# Patient Record
Sex: FEMALE | Race: OTHER | Employment: OTHER | URBAN - METROPOLITAN AREA
[De-identification: names, ages, dates, MRNs, and addresses within clinical notes are randomized per-mention and may not be internally consistent; named-entity substitution may affect disease eponyms.]

---

## 2019-09-18 ENCOUNTER — TELEPHONE (OUTPATIENT)
Dept: RHEUMATOLOGY | Facility: CLINIC | Age: 64
End: 2019-09-18

## 2019-09-18 NOTE — TELEPHONE ENCOUNTER
Attempted to call Keo Pyle with  [facility] to informed Dr. Elier Biswas has agreed to see patient Valerie Vicente  for consult. May call office to schedule appointment.

## 2019-10-02 ENCOUNTER — OFFICE VISIT (OUTPATIENT)
Dept: RHEUMATOLOGY | Facility: CLINIC | Age: 64
End: 2019-10-02
Payer: COMMERCIAL

## 2019-10-02 VITALS
WEIGHT: 167 LBS | DIASTOLIC BLOOD PRESSURE: 80 MMHG | HEIGHT: 63 IN | BODY MASS INDEX: 29.59 KG/M2 | HEART RATE: 90 BPM | SYSTOLIC BLOOD PRESSURE: 124 MMHG | RESPIRATION RATE: 16 BRPM

## 2019-10-02 DIAGNOSIS — G89.29 CHRONIC PAIN OF BOTH KNEES: ICD-10-CM

## 2019-10-02 DIAGNOSIS — Z51.81 THERAPEUTIC DRUG MONITORING: ICD-10-CM

## 2019-10-02 DIAGNOSIS — M25.562 CHRONIC PAIN OF BOTH KNEES: ICD-10-CM

## 2019-10-02 DIAGNOSIS — M05.9 SEROPOSITIVE RHEUMATOID ARTHRITIS (HCC): Primary | ICD-10-CM

## 2019-10-02 DIAGNOSIS — M25.561 CHRONIC PAIN OF BOTH KNEES: ICD-10-CM

## 2019-10-02 PROCEDURE — 99204 OFFICE O/P NEW MOD 45 MIN: CPT | Performed by: INTERNAL MEDICINE

## 2019-10-02 RX ORDER — ENALAPRIL MALEATE 20 MG/1
20 TABLET ORAL DAILY
Refills: 2 | COMMUNITY
Start: 2019-06-04 | End: 2020-05-08

## 2019-10-02 RX ORDER — MELOXICAM 15 MG/1
15 TABLET ORAL DAILY
Refills: 3 | COMMUNITY
Start: 2019-08-29 | End: 2020-02-07

## 2019-10-02 RX ORDER — PREDNISONE 10 MG/1
TABLET ORAL
Qty: 30 TABLET | Refills: 0 | Status: SHIPPED | OUTPATIENT
Start: 2019-10-02 | End: 2020-05-08

## 2019-10-02 NOTE — PATIENT INSTRUCTIONS
You were seen today for RA  Plan to start prednisone   Take 30 mg daily for 3 days then 20 mg daily for 3 days then stay on 10 mg daily until you see me again  Cont the methorexate 6 pills week  Stop the mobic  Will get blood work from previous doctors   F

## 2019-10-02 NOTE — PROGRESS NOTES
Shane Shanks is a 59year old female who presents for Patient presents with:  Consult  Arthritis: Bilateral knees  .    HPI:   CC: joint pain  Consult: referred by DORSI Murphy ([phone])  Rheumatologist Dr. Flor Rogel ([address] [place] [address], [phone]) HYSTERECTOMY     • TUBAL LIGATION        Family History   Problem Relation Age of Onset   • Diabetes Mother    • Hypertension Mother    • Diabetes Sister    • Diabetes Brother       Social History:  Social History    Tobacco Use      Smoking status: Never and abduction of R shoulder  Hip: normal log roll, no lateral hip pain, MAI test negative b/l  Knees: R knee warm, mild swelling, +TTP, limited ROM.     Ankles: R ankle mild swelling, +TTP  Feet: no pain with MTP squeeze, no toe swelling or pain or warmth Negative for crystals. She is found to have normal CMP and vitamin D. Hemoglobin was 9.6 and also found to have low TSH.     Dede Moore MD  10/2/2019  9:33 AM

## 2019-10-17 ENCOUNTER — TELEPHONE (OUTPATIENT)
Dept: RHEUMATOLOGY | Facility: CLINIC | Age: 64
End: 2019-10-17

## 2019-10-25 ENCOUNTER — OFFICE VISIT (OUTPATIENT)
Dept: RHEUMATOLOGY | Facility: CLINIC | Age: 64
End: 2019-10-25
Payer: COMMERCIAL

## 2019-10-25 ENCOUNTER — APPOINTMENT (OUTPATIENT)
Dept: LAB | Facility: HOSPITAL | Age: 64
End: 2019-10-25
Attending: INTERNAL MEDICINE
Payer: COMMERCIAL

## 2019-10-25 ENCOUNTER — HOSPITAL ENCOUNTER (OUTPATIENT)
Dept: GENERAL RADIOLOGY | Facility: HOSPITAL | Age: 64
Discharge: HOME | End: 2019-10-25
Attending: INTERNAL MEDICINE
Payer: COMMERCIAL

## 2019-10-25 VITALS
HEART RATE: 93 BPM | WEIGHT: 167 LBS | HEIGHT: 63 IN | RESPIRATION RATE: 16 BRPM | BODY MASS INDEX: 29.59 KG/M2 | SYSTOLIC BLOOD PRESSURE: 113 MMHG | DIASTOLIC BLOOD PRESSURE: 73 MMHG

## 2019-10-25 DIAGNOSIS — M05.9 SEROPOSITIVE RHEUMATOID ARTHRITIS (HCC): ICD-10-CM

## 2019-10-25 DIAGNOSIS — M25.562 CHRONIC PAIN OF BOTH KNEES: ICD-10-CM

## 2019-10-25 DIAGNOSIS — M25.561 CHRONIC PAIN OF BOTH KNEES: ICD-10-CM

## 2019-10-25 DIAGNOSIS — G89.29 CHRONIC PAIN OF BOTH KNEES: ICD-10-CM

## 2019-10-25 DIAGNOSIS — Z51.81 THERAPEUTIC DRUG MONITORING: ICD-10-CM

## 2019-10-25 DIAGNOSIS — M05.9 SEROPOSITIVE RHEUMATOID ARTHRITIS (HCC): Primary | ICD-10-CM

## 2019-10-25 PROCEDURE — 82955 ASSAY OF G6PD ENZYME: CPT | Performed by: INTERNAL MEDICINE

## 2019-10-25 PROCEDURE — 86704 HEP B CORE ANTIBODY TOTAL: CPT | Performed by: INTERNAL MEDICINE

## 2019-10-25 PROCEDURE — 85652 RBC SED RATE AUTOMATED: CPT | Performed by: INTERNAL MEDICINE

## 2019-10-25 PROCEDURE — 86706 HEP B SURFACE ANTIBODY: CPT | Performed by: INTERNAL MEDICINE

## 2019-10-25 PROCEDURE — 86480 TB TEST CELL IMMUN MEASURE: CPT

## 2019-10-25 PROCEDURE — 99214 OFFICE O/P EST MOD 30 MIN: CPT | Performed by: INTERNAL MEDICINE

## 2019-10-25 PROCEDURE — 84450 TRANSFERASE (AST) (SGOT): CPT

## 2019-10-25 PROCEDURE — 86140 C-REACTIVE PROTEIN: CPT | Performed by: INTERNAL MEDICINE

## 2019-10-25 PROCEDURE — 84460 ALANINE AMINO (ALT) (SGPT): CPT

## 2019-10-25 PROCEDURE — 20610 DRAIN/INJ JOINT/BURSA W/O US: CPT | Performed by: INTERNAL MEDICINE

## 2019-10-25 PROCEDURE — 82040 ASSAY OF SERUM ALBUMIN: CPT

## 2019-10-25 PROCEDURE — 86803 HEPATITIS C AB TEST: CPT | Performed by: INTERNAL MEDICINE

## 2019-10-25 PROCEDURE — 87340 HEPATITIS B SURFACE AG IA: CPT | Performed by: INTERNAL MEDICINE

## 2019-10-25 PROCEDURE — 82565 ASSAY OF CREATININE: CPT

## 2019-10-25 PROCEDURE — 73560 X-RAY EXAM OF KNEE 1 OR 2: CPT | Performed by: INTERNAL MEDICINE

## 2019-10-25 PROCEDURE — 36415 COLL VENOUS BLD VENIPUNCTURE: CPT | Performed by: INTERNAL MEDICINE

## 2019-10-25 PROCEDURE — 85025 COMPLETE CBC W/AUTO DIFF WBC: CPT | Performed by: INTERNAL MEDICINE

## 2019-10-25 RX ORDER — TRIAMCINOLONE ACETONIDE 40 MG/ML
40 INJECTION, SUSPENSION INTRA-ARTICULAR; INTRAMUSCULAR ONCE
Status: COMPLETED | OUTPATIENT
Start: 2019-10-25 | End: 2019-10-25

## 2019-10-25 RX ADMIN — TRIAMCINOLONE ACETONIDE 40 MG: 40 INJECTION, SUSPENSION INTRA-ARTICULAR; INTRAMUSCULAR at 16:25:00

## 2019-10-25 NOTE — PATIENT INSTRUCTIONS
You were seen today for RA  Now continue MTX 6 pills weekly and FA daily  Cont mobic 16 mg daily  Get blood work and XR knee today

## 2019-10-25 NOTE — PROGRESS NOTES
Simón Booker is a 59year old female. HPI:   Patient presents with: Follow - Up: Seropositive RA  Knee Pain: bilateral      I had the pleasure of seeing Simón Booker on 10/25/2019 for follow up Seropositve RA (+RF and CCP).      Current Medications: MG Oral Tab, Take 500 mg by mouth daily. , Disp: , Rfl: 1  Enalapril Maleate 20 MG Oral Tab, Take 20 mg by mouth daily. , Disp: , Rfl: 2  predniSONE 10 MG Oral Tab, Take 30 mg daily for 3 days then 20 mg daily for 3 days then stay on 10 mg daily until you se knee injected with 1 cc of lidocaine and 40 mg of Kenalog in sterile fashion. Patient tolerated procedure well. - Blood work ordered today.   If she continues to have inflammation may need to increase methotrexate or add another DMARD/biologic     R knee

## 2019-10-25 NOTE — PROCEDURES
With paitent's consent, I injected pt's R knee with 1ml lidocaine 1 % and 1 ml kenalog 40. It was done under sterile technique using iodine and alcohol swabs and ethyl chloride was used as an anaesthetic spray. Pt.  tolerated it well.

## 2019-10-30 ENCOUNTER — TELEPHONE (OUTPATIENT)
Dept: RHEUMATOLOGY | Facility: CLINIC | Age: 64
End: 2019-10-30

## 2019-10-30 DIAGNOSIS — R76.12 POSITIVE QUANTIFERON-TB GOLD TEST: Primary | ICD-10-CM

## 2019-10-30 NOTE — TELEPHONE ENCOUNTER
Contacted pt via  and informed of provider recommendations below, pt verbalized understanding. Contact information provided for [address] ([phone]). Pt will complete CXR this week.

## 2019-10-30 NOTE — TELEPHONE ENCOUNTER
[facility] lab called to advise of positive QFT results. Chest XR and ID consult with [facility]? Please advise.      Component      Latest Ref Rng & Units 10/25/2019   Quantiferon TB NIL      IU/mL 0.14   Quantiferon-TB1 Minus NIL      IU/mL 0.31   Quantiferon-TB2 Min

## 2019-10-31 ENCOUNTER — HOSPITAL ENCOUNTER (OUTPATIENT)
Dept: GENERAL RADIOLOGY | Facility: HOSPITAL | Age: 64
Discharge: HOME | End: 2019-10-31
Attending: INTERNAL MEDICINE
Payer: COMMERCIAL

## 2019-10-31 DIAGNOSIS — R76.12 POSITIVE QUANTIFERON-TB GOLD TEST: ICD-10-CM

## 2019-10-31 PROCEDURE — 71046 X-RAY EXAM CHEST 2 VIEWS: CPT | Performed by: INTERNAL MEDICINE

## 2019-11-01 ENCOUNTER — TELEPHONE (OUTPATIENT)
Dept: RHEUMATOLOGY | Facility: CLINIC | Age: 64
End: 2019-11-01

## 2019-11-01 NOTE — TELEPHONE ENCOUNTER
Pt contacted and advised of results with son translating. Son advised pt will need referral from [facility]. Spoke with Rosina Castro at [facility]; he advised to send CXR report, labs, and LOV note to them. They will work on the referral to ID.  Information

## 2019-11-01 NOTE — TELEPHONE ENCOUNTER
If you can let her know that her CXR showed some scarring at the bases but otherwise normal, no cavitations which is normally seen in TB.  Hopefully she made an appt with ID

## 2019-11-07 RX ORDER — METHYLPREDNISOLONE 4 MG/1
TABLET ORAL
Qty: 1 PACKAGE | Refills: 0 | Status: SHIPPED | OUTPATIENT
Start: 2019-11-07 | End: 2020-05-08

## 2019-12-02 ENCOUNTER — TELEPHONE (OUTPATIENT)
Dept: RHEUMATOLOGY | Facility: CLINIC | Age: 64
End: 2019-12-02

## 2019-12-02 NOTE — TELEPHONE ENCOUNTER
Reviewed records that were faxed over.   She had MRI of the R shoulder done showing evidence of distal supraspinatus tendon chronic tear, biceps tenosynovitis, GH joint effusions, AC joint arthropathy, focal edema/contusion along the attachments of the rota

## 2019-12-02 NOTE — TELEPHONE ENCOUNTER
Reviewed blood work that was faxed over. She was found to have a + ABIMBOLA of 1: 1280.   RF was 10, CRP 26 mg/L, ESR 52,

## 2020-01-10 ENCOUNTER — TELEPHONE (OUTPATIENT)
Dept: RHEUMATOLOGY | Facility: CLINIC | Age: 65
End: 2020-01-10

## 2020-01-10 NOTE — TELEPHONE ENCOUNTER
Per patient she would like to come and see Dr Yeison Schultz due to she's in pain to her both knees, and would like to come and see doctor in [facility]. v Patient can only speak Japanese.

## 2020-01-10 NOTE — TELEPHONE ENCOUNTER
LMTCB [id number]. Appt available on Monday in [facility] - 11:40am slot placed on hold for pt.

## 2020-01-13 NOTE — TELEPHONE ENCOUNTER
Per family member, pt would like to keep appt as scheduled for re-evaluation. Sooner appts in [facility] declined.      Future Appointments   Date Time Provider Yamileth Hoang   2/7/2020  4:20 PM Herbert Jean Baptiste MD [address] [facility]

## 2020-02-07 ENCOUNTER — HOSPITAL ENCOUNTER (OUTPATIENT)
Dept: GENERAL RADIOLOGY | Facility: HOSPITAL | Age: 65
Discharge: HOME | End: 2020-02-07
Attending: INTERNAL MEDICINE
Payer: COMMERCIAL

## 2020-02-07 ENCOUNTER — OFFICE VISIT (OUTPATIENT)
Dept: RHEUMATOLOGY | Facility: CLINIC | Age: 65
End: 2020-02-07
Payer: COMMERCIAL

## 2020-02-07 ENCOUNTER — LAB ENCOUNTER (OUTPATIENT)
Dept: LAB | Facility: HOSPITAL | Age: 65
End: 2020-02-07
Attending: INTERNAL MEDICINE
Payer: COMMERCIAL

## 2020-02-07 VITALS — SYSTOLIC BLOOD PRESSURE: 130 MMHG | DIASTOLIC BLOOD PRESSURE: 81 MMHG | HEART RATE: 88 BPM

## 2020-02-07 DIAGNOSIS — M25.562 CHRONIC PAIN OF BOTH KNEES: ICD-10-CM

## 2020-02-07 DIAGNOSIS — Z51.81 THERAPEUTIC DRUG MONITORING: ICD-10-CM

## 2020-02-07 DIAGNOSIS — M25.561 CHRONIC PAIN OF BOTH KNEES: ICD-10-CM

## 2020-02-07 DIAGNOSIS — G89.29 CHRONIC PAIN OF BOTH KNEES: ICD-10-CM

## 2020-02-07 DIAGNOSIS — M05.9 SEROPOSITIVE RHEUMATOID ARTHRITIS (HCC): ICD-10-CM

## 2020-02-07 DIAGNOSIS — M05.9 SEROPOSITIVE RHEUMATOID ARTHRITIS (HCC): Primary | ICD-10-CM

## 2020-02-07 DIAGNOSIS — R76.12 POSITIVE QUANTIFERON-TB GOLD TEST: ICD-10-CM

## 2020-02-07 LAB
ALBUMIN SERPL-MCNC: 3.5 G/DL (ref 3.4–5)
ALT SERPL-CCNC: 19 U/L (ref 13–56)
AST SERPL-CCNC: 14 U/L (ref 15–37)
BASOPHILS # BLD AUTO: 0 X10(3) UL (ref 0–0.2)
BASOPHILS NFR BLD AUTO: 0 %
CREAT BLD-MCNC: 1.09 MG/DL (ref 0.55–1.02)
CRP SERPL-MCNC: 0.72 MG/DL (ref ?–0.3)
DEPRECATED RDW RBC AUTO: 58.8 FL (ref 35.1–46.3)
EOSINOPHIL # BLD AUTO: 0.03 X10(3) UL (ref 0–0.7)
EOSINOPHIL NFR BLD AUTO: 0.5 %
ERYTHROCYTE [DISTWIDTH] IN BLOOD BY AUTOMATED COUNT: 17.2 % (ref 11–15)
ERYTHROCYTE [SEDIMENTATION RATE] IN BLOOD: 25 MM/HR (ref 0–30)
HCT VFR BLD AUTO: 32.3 % (ref 35–48)
HGB BLD-MCNC: 9.9 G/DL (ref 12–16)
IMM GRANULOCYTES # BLD AUTO: 0.02 X10(3) UL (ref 0–1)
IMM GRANULOCYTES NFR BLD: 0.3 %
LYMPHOCYTES # BLD AUTO: 0.6 X10(3) UL (ref 1–4)
LYMPHOCYTES NFR BLD AUTO: 10.2 %
MCH RBC QN AUTO: 28.5 PG (ref 26–34)
MCHC RBC AUTO-ENTMCNC: 30.7 G/DL (ref 31–37)
MCV RBC AUTO: 93.1 FL (ref 80–100)
MONOCYTES # BLD AUTO: 0.25 X10(3) UL (ref 0.1–1)
MONOCYTES NFR BLD AUTO: 4.3 %
NEUTROPHILS # BLD AUTO: 4.96 X10 (3) UL (ref 1.5–7.7)
NEUTROPHILS # BLD AUTO: 4.96 X10(3) UL (ref 1.5–7.7)
NEUTROPHILS NFR BLD AUTO: 84.7 %
PLATELET # BLD AUTO: 229 10(3)UL (ref 150–450)
RBC # BLD AUTO: 3.47 X10(6)UL (ref 3.8–5.3)
WBC # BLD AUTO: 5.9 X10(3) UL (ref 4–11)

## 2020-02-07 PROCEDURE — 73560 X-RAY EXAM OF KNEE 1 OR 2: CPT | Performed by: INTERNAL MEDICINE

## 2020-02-07 PROCEDURE — 84450 TRANSFERASE (AST) (SGOT): CPT

## 2020-02-07 PROCEDURE — 99214 OFFICE O/P EST MOD 30 MIN: CPT | Performed by: INTERNAL MEDICINE

## 2020-02-07 PROCEDURE — 86140 C-REACTIVE PROTEIN: CPT | Performed by: INTERNAL MEDICINE

## 2020-02-07 PROCEDURE — 84460 ALANINE AMINO (ALT) (SGPT): CPT

## 2020-02-07 PROCEDURE — 82565 ASSAY OF CREATININE: CPT

## 2020-02-07 PROCEDURE — 36415 COLL VENOUS BLD VENIPUNCTURE: CPT | Performed by: INTERNAL MEDICINE

## 2020-02-07 PROCEDURE — 82040 ASSAY OF SERUM ALBUMIN: CPT

## 2020-02-07 PROCEDURE — 85025 COMPLETE CBC W/AUTO DIFF WBC: CPT | Performed by: INTERNAL MEDICINE

## 2020-02-07 PROCEDURE — 20610 DRAIN/INJ JOINT/BURSA W/O US: CPT | Performed by: INTERNAL MEDICINE

## 2020-02-07 PROCEDURE — 85652 RBC SED RATE AUTOMATED: CPT | Performed by: INTERNAL MEDICINE

## 2020-02-07 RX ORDER — HYDROCHLOROTHIAZIDE 12.5 MG/1
CAPSULE, GELATIN COATED ORAL
COMMUNITY
Start: 2020-01-03 | End: 2021-03-24

## 2020-02-07 RX ORDER — FOLIC ACID 1 MG/1
1 TABLET ORAL
COMMUNITY
End: 2020-02-07

## 2020-02-07 RX ORDER — FOLIC ACID 1 MG/1
1 TABLET ORAL DAILY
Qty: 90 TABLET | Refills: 0 | Status: SHIPPED | OUTPATIENT
Start: 2020-02-07 | End: 2020-05-08

## 2020-02-07 RX ORDER — TRIAMCINOLONE ACETONIDE 40 MG/ML
40 INJECTION, SUSPENSION INTRA-ARTICULAR; INTRAMUSCULAR
Status: COMPLETED | OUTPATIENT
Start: 2020-02-07 | End: 2020-02-07

## 2020-02-07 RX ORDER — MELOXICAM 15 MG/1
15 TABLET ORAL DAILY
Qty: 90 TABLET | Refills: 0 | Status: SHIPPED | OUTPATIENT
Start: 2020-02-07 | End: 2020-05-08

## 2020-02-07 RX ORDER — LIDOCAINE HYDROCHLORIDE 20 MG/ML
SOLUTION ORAL; TOPICAL
COMMUNITY
Start: 2020-01-09

## 2020-02-07 RX ADMIN — TRIAMCINOLONE ACETONIDE 40 MG: 40 INJECTION, SUSPENSION INTRA-ARTICULAR; INTRAMUSCULAR at 17:16:00

## 2020-02-07 RX ADMIN — TRIAMCINOLONE ACETONIDE 40 MG: 40 INJECTION, SUSPENSION INTRA-ARTICULAR; INTRAMUSCULAR at 17:15:00

## 2020-02-07 NOTE — PATIENT INSTRUCTIONS
You were seen today for RA  Cont methotrexate 6 pills weekly  Folic acid daily  Cont meloxicam 15 mg daily  Plan to taper prednisone, take 5 mg daily until its done   Injected your knees today  Will get a hold of the ID specialist you saw

## 2020-02-07 NOTE — PROGRESS NOTES
Fifi Argueta is a 62year old female. HPI:   Patient presents with:  Knee Pain: Difficult to stand and walk      I had the pleasure of seeing Fifi Argueta on 2/7/2020 for follow up Seropositve RA (+RF and CCP).      Current Medications:  MTX 6 pills we PO QAM     • methotrexate 2.5 MG Oral Tab Take 2.5 mg by mouth once a week. TK 6 TAB WEEKLY ALL ON SAME DAY  4   • Meloxicam 15 MG Oral Tab Take 15 mg by mouth daily. 3   • metFORMIN HCl 500 MG Oral Tab Take 500 mg by mouth daily.   1   • predniSONE 10 MG and CCP)  - most of her pain is in her bll knees and ankles  - Continue methotrexate 6 pills weekly and folic acid daily  - Cont mobic 15 mg daily  - start tapering prednisone, take 5 mg daily for the next 2 weeks then stop  - Blood work today    +Quant TB

## 2020-02-10 ENCOUNTER — TELEPHONE (OUTPATIENT)
Dept: INTERNAL MEDICINE CLINIC | Facility: CLINIC | Age: 65
End: 2020-02-10

## 2020-02-10 NOTE — TELEPHONE ENCOUNTER
• methotrexate 2.5 MG Oral Tab Take 6 tablets (15 mg total) by mouth once a week. TK 6 TAB WEEKLY ALL ON SAME DAY 72 tablet 0     To submit PA for Kat Leong:    1) Go to key. [employer] and click \"Enter a Key\"  2) Enter the patient's last name, d

## 2020-02-10 NOTE — TELEPHONE ENCOUNTER
PA started on Cover My Meds: Please advise the dispensing pharmacy to [address] [place] [address] at [phone] for assistance. Pharmacy contacted and aware.

## 2020-02-20 NOTE — PROGRESS NOTES
Attempted to contact pt via [employer] ID #[id number]- no answer, left message to contact office. Ext [phone] today. No response letter mailed to pt with results and provider comments.

## 2020-03-19 ENCOUNTER — TELEPHONE (OUTPATIENT)
Dept: RHEUMATOLOGY | Facility: CLINIC | Age: 65
End: 2020-03-19

## 2020-03-19 NOTE — TELEPHONE ENCOUNTER
Patient was found to have a positive TB. She was seen by infectious disease at [facility] and they stated that she was treated via the [address]. Fax received and she was treated with rifampin back in January 2018.   She completed

## 2020-05-08 ENCOUNTER — TELEPHONE (OUTPATIENT)
Dept: RHEUMATOLOGY | Facility: CLINIC | Age: 65
End: 2020-05-08

## 2020-05-08 ENCOUNTER — OFFICE VISIT (OUTPATIENT)
Dept: RHEUMATOLOGY | Facility: CLINIC | Age: 65
End: 2020-05-08
Payer: COMMERCIAL

## 2020-05-08 ENCOUNTER — APPOINTMENT (OUTPATIENT)
Dept: LAB | Facility: HOSPITAL | Age: 65
End: 2020-05-08
Attending: INTERNAL MEDICINE
Payer: COMMERCIAL

## 2020-05-08 VITALS
RESPIRATION RATE: 16 BRPM | SYSTOLIC BLOOD PRESSURE: 102 MMHG | DIASTOLIC BLOOD PRESSURE: 68 MMHG | BODY MASS INDEX: 30 KG/M2 | HEIGHT: 63 IN | HEART RATE: 96 BPM

## 2020-05-08 DIAGNOSIS — M25.561 CHRONIC PAIN OF BOTH KNEES: ICD-10-CM

## 2020-05-08 DIAGNOSIS — M05.9 SEROPOSITIVE RHEUMATOID ARTHRITIS (HCC): ICD-10-CM

## 2020-05-08 DIAGNOSIS — M25.562 CHRONIC PAIN OF BOTH KNEES: ICD-10-CM

## 2020-05-08 DIAGNOSIS — R76.12 POSITIVE QUANTIFERON-TB GOLD TEST: ICD-10-CM

## 2020-05-08 DIAGNOSIS — G89.29 CHRONIC PAIN OF BOTH KNEES: ICD-10-CM

## 2020-05-08 DIAGNOSIS — Z51.81 THERAPEUTIC DRUG MONITORING: ICD-10-CM

## 2020-05-08 DIAGNOSIS — M05.9 SEROPOSITIVE RHEUMATOID ARTHRITIS (HCC): Primary | ICD-10-CM

## 2020-05-08 PROCEDURE — 86140 C-REACTIVE PROTEIN: CPT | Performed by: INTERNAL MEDICINE

## 2020-05-08 PROCEDURE — 82040 ASSAY OF SERUM ALBUMIN: CPT

## 2020-05-08 PROCEDURE — 85025 COMPLETE CBC W/AUTO DIFF WBC: CPT | Performed by: INTERNAL MEDICINE

## 2020-05-08 PROCEDURE — 20610 DRAIN/INJ JOINT/BURSA W/O US: CPT | Performed by: INTERNAL MEDICINE

## 2020-05-08 PROCEDURE — 84450 TRANSFERASE (AST) (SGOT): CPT

## 2020-05-08 PROCEDURE — 36415 COLL VENOUS BLD VENIPUNCTURE: CPT | Performed by: INTERNAL MEDICINE

## 2020-05-08 PROCEDURE — 99214 OFFICE O/P EST MOD 30 MIN: CPT | Performed by: INTERNAL MEDICINE

## 2020-05-08 PROCEDURE — 85652 RBC SED RATE AUTOMATED: CPT | Performed by: INTERNAL MEDICINE

## 2020-05-08 PROCEDURE — 82565 ASSAY OF CREATININE: CPT

## 2020-05-08 PROCEDURE — 84460 ALANINE AMINO (ALT) (SGPT): CPT

## 2020-05-08 RX ORDER — FOLIC ACID 1 MG/1
1 TABLET ORAL DAILY
Qty: 90 TABLET | Refills: 0 | Status: SHIPPED | OUTPATIENT
Start: 2020-05-08 | End: 2020-10-05

## 2020-05-08 RX ORDER — TRIAMCINOLONE ACETONIDE 40 MG/ML
40 INJECTION, SUSPENSION INTRA-ARTICULAR; INTRAMUSCULAR
Status: COMPLETED | OUTPATIENT
Start: 2020-05-08 | End: 2020-05-08

## 2020-05-08 RX ORDER — MELOXICAM 15 MG/1
15 TABLET ORAL DAILY
Qty: 90 TABLET | Refills: 0 | Status: SHIPPED | OUTPATIENT
Start: 2020-05-08

## 2020-05-08 RX ADMIN — TRIAMCINOLONE ACETONIDE 40 MG: 40 INJECTION, SUSPENSION INTRA-ARTICULAR; INTRAMUSCULAR at 10:40:00

## 2020-05-08 RX ADMIN — TRIAMCINOLONE ACETONIDE 40 MG: 40 INJECTION, SUSPENSION INTRA-ARTICULAR; INTRAMUSCULAR at 10:41:00

## 2020-05-08 NOTE — PATIENT INSTRUCTIONS
You were seen today for rheumatoid arthritis  You continue to have bilateral knee pain despite being on methotrexate  Plan on starting Humira, this will need a prior authorization  Please get blood work today  We injected both knees with cortisone today, h

## 2020-05-08 NOTE — PROGRESS NOTES
Butch Laughlin is a 62year old female. HPI:   Patient presents with:   Follow - Up: Seropositive RA  Knee Pain: Bilateral, onset x1 month, Injection      I had the pleasure of seeing Butch Laughlin on 5/8/2020 for follow up Seropositve RA (+RF hydrochlorothiazide 12.5 MG Oral Cap TK 1 C PO QAM     • methotrexate 2.5 MG Oral Tab Take 6 tablets (15 mg total) by mouth once a week. TK 6 TAB WEEKLY ALL ON SAME DAY 72 tablet 0   • Meloxicam 15 MG Oral Tab Take 1 tablet (15 mg total) by mouth daily.  80 mood       LABS:     May 2019:  Vitamin D 48, Hgb 9.2  RF 17.9, ESR 26, CRP 6.9 mg/L    Previous rheumatologist  Synovial fluid removed showing cell count of 38,405, nucleated cells 83%.  Negative for crystals     Imaging:     None    ASSESSMENT/PLAN:     S

## 2020-05-08 NOTE — TELEPHONE ENCOUNTER
Pt was provided with [facility] pt assistance application - she will complete and bring it back to office.

## 2020-05-11 ENCOUNTER — TELEPHONE (OUTPATIENT)
Dept: RHEUMATOLOGY | Facility: CLINIC | Age: 65
End: 2020-05-11

## 2020-05-11 RX ORDER — PREDNISONE 10 MG/1
TABLET ORAL
Qty: 30 TABLET | Refills: 0 | Status: SHIPPED | OUTPATIENT
Start: 2020-05-11 | End: 2020-06-08

## 2020-06-08 RX ORDER — PREDNISONE 10 MG/1
TABLET ORAL
Qty: 30 TABLET | Refills: 0 | Status: SHIPPED | OUTPATIENT
Start: 2020-06-08 | End: 2020-08-05

## 2020-06-08 NOTE — TELEPHONE ENCOUNTER
Requested Prescriptions     Pending Prescriptions Disp Refills   • predniSONE 10 MG Oral Tab 30 tablet 0     Si mg daily x3 days then 30 mg daily x3 days then 20 mg daily x3 days then 10 mg daily x3 days then stop     LF: 20 #30 TAB W/ 0 RF   LOV risks/benefits d/w patient  - she had a +quant TB in 2017 but treated with rifampin for 4 mos in Jan 2018, will need annual CXR to monitor  - Blood work today     +Quant TB  - treated in Jan 2018     B/L knee pain secondary to OA and RA  - synovial fluid a

## 2020-06-08 NOTE — TELEPHONE ENCOUNTER
Per [employer], they are missing pt's proof of income to determine eligibility. Someone from the program will contact pt again to obtain necessary information.

## 2020-06-08 NOTE — TELEPHONE ENCOUNTER
Spoke with pt - she reports prednisone burst helped significantly. Her pain has decreased and she is able to walk short distances with the assistance of her walker. Pt is requesting a refill of prednisone.      INDRA Moore ([facility] pt assistnace program) is

## 2020-06-18 NOTE — TELEPHONE ENCOUNTER
Contacted pt to follow up. Advised pt to call [employer] pt assistance [facility] and provide missing information to determine eligibility.

## 2020-06-27 ENCOUNTER — HOSPITAL ENCOUNTER (OUTPATIENT)
Dept: CT IMAGING | Facility: HOSPITAL | Age: 65
Discharge: HOME | End: 2020-06-27
Attending: NURSE PRACTITIONER
Payer: COMMERCIAL

## 2020-06-27 DIAGNOSIS — K59.00 CONSTIPATED: ICD-10-CM

## 2020-06-27 LAB — CREAT BLD-MCNC: 1 MG/DL (ref 0.55–1.02)

## 2020-06-27 PROCEDURE — 82565 ASSAY OF CREATININE: CPT

## 2020-06-27 PROCEDURE — 74177 CT ABD & PELVIS W/CONTRAST: CPT | Performed by: NURSE PRACTITIONER

## 2020-07-08 ENCOUNTER — OFFICE VISIT (OUTPATIENT)
Dept: RHEUMATOLOGY | Facility: CLINIC | Age: 65
End: 2020-07-08
Payer: COMMERCIAL

## 2020-07-08 ENCOUNTER — APPOINTMENT (OUTPATIENT)
Dept: LAB | Facility: HOSPITAL | Age: 65
End: 2020-07-08
Attending: INTERNAL MEDICINE
Payer: COMMERCIAL

## 2020-07-08 VITALS
BODY MASS INDEX: 28.88 KG/M2 | DIASTOLIC BLOOD PRESSURE: 79 MMHG | SYSTOLIC BLOOD PRESSURE: 153 MMHG | HEIGHT: 63 IN | HEART RATE: 87 BPM | WEIGHT: 163 LBS

## 2020-07-08 DIAGNOSIS — M25.561 CHRONIC PAIN OF BOTH KNEES: ICD-10-CM

## 2020-07-08 DIAGNOSIS — G89.29 CHRONIC PAIN OF BOTH KNEES: ICD-10-CM

## 2020-07-08 DIAGNOSIS — M25.562 CHRONIC PAIN OF BOTH KNEES: ICD-10-CM

## 2020-07-08 DIAGNOSIS — R76.12 POSITIVE QUANTIFERON-TB GOLD TEST: ICD-10-CM

## 2020-07-08 DIAGNOSIS — M05.9 SEROPOSITIVE RHEUMATOID ARTHRITIS (HCC): Primary | ICD-10-CM

## 2020-07-08 DIAGNOSIS — Z51.81 THERAPEUTIC DRUG MONITORING: ICD-10-CM

## 2020-07-08 DIAGNOSIS — M05.9 SEROPOSITIVE RHEUMATOID ARTHRITIS (HCC): ICD-10-CM

## 2020-07-08 LAB
ALBUMIN SERPL-MCNC: 3.6 G/DL (ref 3.4–5)
ALT SERPL-CCNC: 22 U/L (ref 13–56)
AST SERPL-CCNC: 18 U/L (ref 15–37)
BASOPHILS # BLD AUTO: 0.01 X10(3) UL (ref 0–0.2)
BASOPHILS NFR BLD AUTO: 0.1 %
CREAT BLD-MCNC: 1.13 MG/DL (ref 0.55–1.02)
CRP SERPL-MCNC: 0.46 MG/DL (ref ?–0.3)
DEPRECATED RDW RBC AUTO: 63.5 FL (ref 35.1–46.3)
EOSINOPHIL # BLD AUTO: 0.16 X10(3) UL (ref 0–0.7)
EOSINOPHIL NFR BLD AUTO: 2.2 %
ERYTHROCYTE [DISTWIDTH] IN BLOOD BY AUTOMATED COUNT: 17.2 % (ref 11–15)
ERYTHROCYTE [SEDIMENTATION RATE] IN BLOOD: 21 MM/HR (ref 0–30)
HCT VFR BLD AUTO: 33.1 % (ref 35–48)
HGB BLD-MCNC: 10.3 G/DL (ref 12–16)
IMM GRANULOCYTES # BLD AUTO: 0.05 X10(3) UL (ref 0–1)
IMM GRANULOCYTES NFR BLD: 0.7 %
LYMPHOCYTES # BLD AUTO: 1.82 X10(3) UL (ref 1–4)
LYMPHOCYTES NFR BLD AUTO: 25.3 %
MCH RBC QN AUTO: 31.1 PG (ref 26–34)
MCHC RBC AUTO-ENTMCNC: 31.1 G/DL (ref 31–37)
MCV RBC AUTO: 100 FL (ref 80–100)
MONOCYTES # BLD AUTO: 0.62 X10(3) UL (ref 0.1–1)
MONOCYTES NFR BLD AUTO: 8.6 %
NEUTROPHILS # BLD AUTO: 4.53 X10 (3) UL (ref 1.5–7.7)
NEUTROPHILS # BLD AUTO: 4.53 X10(3) UL (ref 1.5–7.7)
NEUTROPHILS NFR BLD AUTO: 63.1 %
PLATELET # BLD AUTO: 214 10(3)UL (ref 150–450)
RBC # BLD AUTO: 3.31 X10(6)UL (ref 3.8–5.3)
WBC # BLD AUTO: 7.2 X10(3) UL (ref 4–11)

## 2020-07-08 PROCEDURE — 85652 RBC SED RATE AUTOMATED: CPT | Performed by: INTERNAL MEDICINE

## 2020-07-08 PROCEDURE — 86140 C-REACTIVE PROTEIN: CPT | Performed by: INTERNAL MEDICINE

## 2020-07-08 PROCEDURE — 84460 ALANINE AMINO (ALT) (SGPT): CPT

## 2020-07-08 PROCEDURE — 84450 TRANSFERASE (AST) (SGOT): CPT

## 2020-07-08 PROCEDURE — 82565 ASSAY OF CREATININE: CPT

## 2020-07-08 PROCEDURE — 99214 OFFICE O/P EST MOD 30 MIN: CPT | Performed by: INTERNAL MEDICINE

## 2020-07-08 PROCEDURE — 82040 ASSAY OF SERUM ALBUMIN: CPT

## 2020-07-08 PROCEDURE — 36415 COLL VENOUS BLD VENIPUNCTURE: CPT | Performed by: INTERNAL MEDICINE

## 2020-07-08 PROCEDURE — 85025 COMPLETE CBC W/AUTO DIFF WBC: CPT | Performed by: INTERNAL MEDICINE

## 2020-07-08 RX ORDER — PREDNISONE 1 MG/1
5 TABLET ORAL DAILY
Qty: 30 TABLET | Refills: 0 | Status: SHIPPED | OUTPATIENT
Start: 2020-07-08 | End: 2020-08-07

## 2020-07-08 NOTE — TELEPHONE ENCOUNTER
Per [facility] pt assistance program, pt has not provided proof of income. Pt was instructed to call [employer] at [phone] during today's appt to provide necessary information.

## 2020-07-08 NOTE — PATIENT INSTRUCTIONS
You were seen today for RA  Now cont MTX 6 pills weekly  Also continue folic acid daily  Will prescribe prednisone 5 mg daily  Blood work today  Need to get you approved for [employer]

## 2020-07-08 NOTE — PROGRESS NOTES
Batool Herrera is a 62year old female. HPI:   Patient presents with: Follow - Up  Knee Pain      I had the pleasure of seeing Batool Herrera on 7/8/2020 for follow up Seropositve RA (+RF and CCP).      Current Medications: visit):  Current Outpatient Medications   Medication Sig Dispense Refill   • predniSONE 10 MG Oral Tab 40 mg daily x3 days then 30 mg daily x3 days then 20 mg daily x3 days then 10 mg daily x3 days then stop 30 tablet 0   • methotrexate 2.5 MG Oral Tab Brianne Lindo rheumatologist  Synovial fluid removed showing cell count of 38,405, nucleated cells 83%.  Negative for crystals     Imaging:     None    ASSESSMENT/PLAN:     Seropositive RA (+RF and CCP)  - most of her pain is in her bll knees and ankles  - Continue metho

## 2020-07-09 ENCOUNTER — TELEPHONE (OUTPATIENT)
Dept: RHEUMATOLOGY | Facility: CLINIC | Age: 65
End: 2020-07-09

## 2020-07-09 NOTE — TELEPHONE ENCOUNTER
Marisa Harrison from [employer] returned call and confirmed she received VM. She will authorize MTX #72 tablets at local [facility].

## 2020-07-09 NOTE — PROGRESS NOTES
Contacted pt via  Caryn Ricci ID [id number] and informed of results. Pt verbalized understanding with no additional questions.

## 2020-07-13 ENCOUNTER — TELEPHONE (OUTPATIENT)
Dept: INTERNAL MEDICINE CLINIC | Facility: CLINIC | Age: 65
End: 2020-07-13

## 2020-07-13 NOTE — TELEPHONE ENCOUNTER
Patient dropped off form for financial assistance for Humira. She also dropped off copies of Tax return of 2019 and other documents. Patient asked if nurse can look over application to make sure it was filled out correctly. Please advise.

## 2020-07-15 ENCOUNTER — TELEPHONE (OUTPATIENT)
Dept: INTERNAL MEDICINE CLINIC | Facility: CLINIC | Age: 65
End: 2020-07-15

## 2020-07-15 NOTE — TELEPHONE ENCOUNTER
[employer] representative [facility] notified of updated paperwork faxed in today. Additional forms will be reviewed and pt will be contacted by [facility] for follow up in [facility].      Pt updated and instructed to contact [facility] at [phone] an

## 2020-07-15 NOTE — TELEPHONE ENCOUNTER
Patient dropped off paperwork (see encounter 7/13), patient states she has a phone number for [employer] but she claims she can't get thru to anyone. Patient would like to know if there is another number she can dial. Please advise.

## 2020-07-15 NOTE — TELEPHONE ENCOUNTER
Application faxed to [facility] patient assistance foundation. Confirmation received.      Contacted pt's daughter Elvis Primes to update; she is aware pt will need to contact [facility] assist to follow up on status of application and any additional requirements

## 2020-07-17 NOTE — TELEPHONE ENCOUNTER
Pt has been approved for assistance until 7/16/2021. Does patient need at teaching on office with the nurse?

## 2020-07-21 NOTE — TELEPHONE ENCOUNTER
Left detailed message via  Summer Jrodan #[id number]. Can patient come to the [address] [place] [address] office [facility] the [facility] nurse this Tuesday, Wednesday, or Thursday?? Okay to schedule if patient is agreeable when she calls back.  Pt was approved

## 2020-07-23 ENCOUNTER — TELEPHONE (OUTPATIENT)
Dept: RHEUMATOLOGY | Facility: CLINIC | Age: 65
End: 2020-07-23

## 2020-07-23 NOTE — TELEPHONE ENCOUNTER
Addy Velasquez called and left same message in Danish. If patient is agreeable please schedule [facility] nurse visit [facility]. She will need to contact [employer] at [phone] to schedule delivery of medication.     Elvis Cardona   Medical Assistant      Tel

## 2020-07-23 NOTE — TELEPHONE ENCOUNTER
Called and left a VM in Colombian, inform patient to call and schedule and [facility] nurse visit [facility] with the [facility] nurse for  Tuesday, Wednesday, or Thursday?? Okay to schedule if patient is agreeable when she calls back.  Pt was approved for patient

## 2020-07-23 NOTE — TELEPHONE ENCOUNTER
Patient states that she is returning the nurses call. No  Message noted. Pt is asking for return call to be in Estonian.

## 2020-07-31 ENCOUNTER — TELEPHONE (OUTPATIENT)
Dept: RHEUMATOLOGY | Facility: CLINIC | Age: 65
End: 2020-07-31

## 2020-07-31 NOTE — TELEPHONE ENCOUNTER
Patient called in (with ) stating that she has the Humira medication from [place]. She would like to schedule the teaching appointment with the nurses. Please advise.

## 2020-08-03 ENCOUNTER — TELEPHONE (OUTPATIENT)
Dept: RHEUMATOLOGY | Facility: CLINIC | Age: 65
End: 2020-08-03

## 2020-08-03 NOTE — TELEPHONE ENCOUNTER
Patient is asking for a called back she trying to see not understanding the message with the  Basil Estrada on the phone and also she trying to see what she coming for is it the shot or training       Please advise   [phone]

## 2020-08-03 NOTE — TELEPHONE ENCOUNTER
Puerto Rican Speaking - Patient is returning phone call and is requesting call back at # [phone].

## 2020-08-03 NOTE — TELEPHONE ENCOUNTER
Callled and left a VM to patient in Citizen of [place] informed to return call and to help schedule the Nurse teaching for Humira for today in afternoon or Friday. Please help when she calls back.

## 2020-08-04 NOTE — TELEPHONE ENCOUNTER
Via Colombian, , Rony Bruce #[id number], left message to call office back. Please schedule Humira teaching with [facility] nurse for this Friday or next week at [facility] when patient calls back, patient does not need to bring medication to office.  Office has a sampl

## 2020-08-05 RX ORDER — PREDNISONE 10 MG/1
TABLET ORAL
Qty: 21 TABLET | Refills: 0 | Status: SHIPPED | OUTPATIENT
Start: 2020-08-05 | End: 2020-09-17

## 2020-08-05 NOTE — TELEPHONE ENCOUNTER
Requested Prescriptions     Pending Prescriptions Disp Refills   • predniSONE 10 MG Oral Tab 30 tablet 0     Si mg daily x3 days then 30 mg daily x3 days then 20 mg daily x3 days then 10 mg daily x3 days then stop       LF: 20 #30 TAB W/ 0 RF  LOV only supposed to be on a tapering dose. Advised to take prednisone 10 mg daily for the next week and then go down to 5 mg daily. - Currently trying to get her approved for Humira, insurance is needing information from patient.   She will provide that and

## 2020-08-05 NOTE — TELEPHONE ENCOUNTER
Can you call her and tell her I changed the script a little bit  - so it's over 6 days - does she want to come in earlier if she is flaring?

## 2020-08-05 NOTE — TELEPHONE ENCOUNTER
Patient called in (with ) and was given message below. She is scheduled for 8/7 at 10am for the teaching.

## 2020-08-07 ENCOUNTER — NURSE ONLY (OUTPATIENT)
Dept: RHEUMATOLOGY | Facility: CLINIC | Age: 65
End: 2020-08-07

## 2020-08-07 DIAGNOSIS — M05.9 SEROPOSITIVE RHEUMATOID ARTHRITIS (HCC): ICD-10-CM

## 2020-08-07 DIAGNOSIS — Z51.81 ENCOUNTER FOR THERAPEUTIC DRUG MONITORING: ICD-10-CM

## 2020-08-07 DIAGNOSIS — M25.562 CHRONIC PAIN OF BOTH KNEES: ICD-10-CM

## 2020-08-07 DIAGNOSIS — R76.12 POSITIVE QUANTIFERON-TB GOLD TEST: ICD-10-CM

## 2020-08-07 DIAGNOSIS — Z51.81 THERAPEUTIC DRUG MONITORING: ICD-10-CM

## 2020-08-07 DIAGNOSIS — M25.561 CHRONIC PAIN OF BOTH KNEES: ICD-10-CM

## 2020-08-07 DIAGNOSIS — G89.29 CHRONIC PAIN OF BOTH KNEES: ICD-10-CM

## 2020-08-07 DIAGNOSIS — M05.79 SEROPOSITIVE RHEUMATOID ARTHRITIS OF MULTIPLE SITES (HCC): Primary | ICD-10-CM

## 2020-08-07 PROCEDURE — 99211 OFF/OP EST MAY X REQ PHY/QHP: CPT | Performed by: INTERNAL MEDICINE

## 2020-08-07 RX ORDER — PREDNISONE 1 MG/1
5 TABLET ORAL DAILY
Qty: 90 TABLET | Refills: 0 | Status: SHIPPED | OUTPATIENT
Start: 2020-08-07 | End: 2020-10-05

## 2020-08-07 NOTE — PATIENT INSTRUCTIONS
Adalimumab Injection  Brand Names: CYLTEZO, Humira  ¿Qué es emiliano medicamento? El ADALIMUMAB se [place] para el tratamiento de la artritis psoriásica y reumatoide.  También se [place] para el tratamiento de la espondilitis anquilosante, enfermedad de Crohn, colit · hinchazón de los tobillos  · ganglios linfáticos inflamados del wendy, axilas, o regiones de la salud  · pérdida de peso inexplicada  · sangrado, magulladuras inusuales  · cansancio o debilidad inusual  Efectos secundarios que, por lo general, no requie · recibió ricky vacuna recientemente o si está programada para recibir [place] [employer]  · programado para tener ricky cirugía  · tuberculosis, ricky prueba cutánea positiva para la tuberculosis o si ha estado en contacto con alguien que tiene tuberculosis  · ricky reac

## 2020-08-07 NOTE — TELEPHONE ENCOUNTER
LOV: 7/8/2020  No future appointments.   Labs:   Component      Latest Ref Rng & Units 7/8/2020   WBC      4.0 - 11.0 x10(3) uL 7.2   RBC      3.80 - 5.30 x10(6)uL 3.31 (L)   Hemoglobin      12.0 - 16.0 g/dL 10.3 (L)   Hematocrit      35.0 - 48.0 % 33.1 (L)

## 2020-08-07 NOTE — PROGRESS NOTES
Patient came in for [facility] teaching. , Chiquita Oh #[id number], from language to translate. Name and  verified. Patient educated on proper handling/storage/disposal of medications. Patient informed of proper injection and aseptic technique.  Jose Vincent Coughing    Comment:cough    Past Medical History:   Diagnosis Date   • Arthritis    • Diabetes [facility])    • Essential hypertension      Past Surgical History:   Procedure Laterality Date   • HYSTERECTOMY     • TUBAL LIGATION       Social History rheumatoid arthritis of multiple sites (hcc)  (primary encounter diagnosis)  Encounter for therapeutic drug monitoring    Orders Placed This Encounter      Albumin Serum      ALT(SGPT)      AST (SGOT)      C-Reactive Protein      CBC W Differential W Plate

## 2020-08-07 NOTE — TELEPHONE ENCOUNTER
Patient seen for [facility] teaching today, patient to follow up with [facility] to obtain rx.

## 2020-08-10 NOTE — TELEPHONE ENCOUNTER
Called and spoke with patient, name and  verified. Informed per Dr. Lawyer Villarreal refilled medication and need to F/U with Dr. Abi Kaur in October. Patient verbalized understanding and scheduled for 10/4/20.

## 2020-08-18 ENCOUNTER — TELEPHONE (OUTPATIENT)
Dept: RHEUMATOLOGY | Facility: CLINIC | Age: 65
End: 2020-08-18

## 2020-08-18 NOTE — TELEPHONE ENCOUNTER
With  Evan Lat #[id number])  Patient would like to know when she can come in and get injections done from Dr. William Chow office? Patient doesn't recall the name of the injections. Please call her back. Thank you.

## 2020-08-18 NOTE — TELEPHONE ENCOUNTER
Via  Bryan Merida #[id number], patient contacted. She need clarification and when next injection was due. Pt advised via  Humira injection is due on 8/21/2020. Advised Humira injection is every 14 days.  Via  patient verbalize

## 2020-08-31 PROBLEM — I10 HYPERTENSION, ESSENTIAL: Status: ACTIVE | Noted: 2020-08-31

## 2020-08-31 PROBLEM — I25.10 CORONARY ARTERY CALCIFICATION OF NATIVE ARTERY: Status: ACTIVE | Noted: 2020-08-31

## 2020-08-31 PROBLEM — I25.84 CORONARY ARTERY CALCIFICATION OF NATIVE ARTERY: Status: ACTIVE | Noted: 2020-08-31

## 2020-09-12 ENCOUNTER — TELEPHONE (OUTPATIENT)
Dept: INTERNAL MEDICINE CLINIC | Facility: CLINIC | Age: 65
End: 2020-09-12

## 2020-09-12 ENCOUNTER — HOSPITAL ENCOUNTER (OUTPATIENT)
Dept: NUCLEAR MEDICINE | Facility: HOSPITAL | Age: 65
Discharge: HOME | End: 2020-09-12
Attending: INTERNAL MEDICINE
Payer: COMMERCIAL

## 2020-09-12 ENCOUNTER — HOSPITAL ENCOUNTER (OUTPATIENT)
Dept: CV DIAGNOSTICS | Facility: HOSPITAL | Age: 65
Discharge: HOME | End: 2020-09-12
Attending: INTERNAL MEDICINE
Payer: COMMERCIAL

## 2020-09-12 DIAGNOSIS — R06.00 DYSPNEA, UNSPECIFIED TYPE: ICD-10-CM

## 2020-09-12 DIAGNOSIS — I25.84 CORONARY ARTERY CALCIFICATION OF NATIVE ARTERY: ICD-10-CM

## 2020-09-12 DIAGNOSIS — I25.10 CORONARY ARTERY CALCIFICATION OF NATIVE ARTERY: ICD-10-CM

## 2020-09-12 PROCEDURE — 93018 CV STRESS TEST I&R ONLY: CPT | Performed by: INTERNAL MEDICINE

## 2020-09-12 PROCEDURE — 78452 HT MUSCLE IMAGE SPECT MULT: CPT | Performed by: INTERNAL MEDICINE

## 2020-09-12 PROCEDURE — 93016 CV STRESS TEST SUPVJ ONLY: CPT | Performed by: INTERNAL MEDICINE

## 2020-09-12 PROCEDURE — 93017 CV STRESS TEST TRACING ONLY: CPT | Performed by: INTERNAL MEDICINE

## 2020-09-12 NOTE — TELEPHONE ENCOUNTER
Current Outpatient Medications   Medication Sig Dispense Refill   • predniSONE 10 MG Oral Tab Take 6 tabsx 1 day, take 5 tabsx 1 day, take 4 tabsx 1 day,take 3 tabsx 1 day, take 2 tabsx 1 day, take 1 tabsx 1 day, then off 21 tablet 0

## 2020-09-14 NOTE — TELEPHONE ENCOUNTER
Left message via  to call office back regarding refill request. Is pt having a flare up? Ext [phone].

## 2020-09-15 NOTE — TELEPHONE ENCOUNTER
Requested Prescriptions     Pending Prescriptions Disp Refills   • predniSONE 10 MG Oral Tab 21 tablet 0     Sig: Take 6 tabsx 1 day, take 5 tabsx 1 day, take 4 tabsx 1 day,take 3 tabsx 1 day, take 2 tabsx 1 day, take 1 tabsx 1 day, then off     LF:8/5/20 prednisone 20 mg daily which is likely helping her knee pain. She is only supposed to be on a tapering dose. Advised to take prednisone 10 mg daily for the next week and then go down to 5 mg daily.   - Currently trying to get her approved for Humira, insu

## 2020-09-16 NOTE — TELEPHONE ENCOUNTER
Called and left a VM to patient in Barbadian, inform per Dr. Binu Grider if she's is requesting a prednisone taper, to find out if she is having an RA flare. She is post to be on prednisone 5 mg daily only. Please call office to let us know of this information.

## 2020-09-16 NOTE — TELEPHONE ENCOUNTER
Pt is requesting a prednisone taper, can you call her and find out if she is having an RA flare.   She is post to be on prednisone 5 mg daily only

## 2020-09-17 RX ORDER — PREDNISONE 10 MG/1
TABLET ORAL
Qty: 21 TABLET | Refills: 0 | Status: SHIPPED | OUTPATIENT
Start: 2020-09-17 | End: 2020-09-30

## 2020-09-17 NOTE — TELEPHONE ENCOUNTER
Spoke with pt via  Shakira Falk ID # [id number]. Pt reports severe pain to bilateral knees x 2 weeks. Denies swelling. Pt stts \"Humira is not helping\". Explained to pt that it may take up to 3 months for full benefits of medication.  She has f/u

## 2020-09-18 ENCOUNTER — TELEPHONE (OUTPATIENT)
Dept: RHEUMATOLOGY | Facility: CLINIC | Age: 65
End: 2020-09-18

## 2020-09-18 NOTE — TELEPHONE ENCOUNTER
Left message informing [facility] pt needs PA for methotrexate. Advised to call office back with any additional questions.

## 2020-09-18 NOTE — TELEPHONE ENCOUNTER
Levoshelliee Arnoldo from [address] is needing a pre-authorization for methotrexate 2.5 MG Oral Tab.     Carol Mclaughlin provided pre-authorization ph. [phone] extension [phone]

## 2020-09-25 ENCOUNTER — LAB ENCOUNTER (OUTPATIENT)
Dept: LAB | Facility: HOSPITAL | Age: 65
End: 2020-09-25
Attending: INTERNAL MEDICINE
Payer: COMMERCIAL

## 2020-09-25 DIAGNOSIS — Z51.81 ENCOUNTER FOR THERAPEUTIC DRUG MONITORING: ICD-10-CM

## 2020-09-25 DIAGNOSIS — M05.79 SEROPOSITIVE RHEUMATOID ARTHRITIS OF MULTIPLE SITES (HCC): ICD-10-CM

## 2020-09-25 LAB
ALBUMIN SERPL-MCNC: 3.4 G/DL (ref 3.4–5)
ALT SERPL-CCNC: 21 U/L
AST SERPL-CCNC: 15 U/L (ref 15–37)
BASOPHILS # BLD AUTO: 0.01 X10(3) UL (ref 0–0.2)
BASOPHILS NFR BLD AUTO: 0.2 %
CREAT BLD-MCNC: 1.07 MG/DL
CRP SERPL-MCNC: 0.36 MG/DL (ref ?–0.3)
DEPRECATED RDW RBC AUTO: 54.5 FL (ref 35.1–46.3)
EOSINOPHIL # BLD AUTO: 0.18 X10(3) UL (ref 0–0.7)
EOSINOPHIL NFR BLD AUTO: 2.9 %
ERYTHROCYTE [DISTWIDTH] IN BLOOD BY AUTOMATED COUNT: 15.2 % (ref 11–15)
ERYTHROCYTE [SEDIMENTATION RATE] IN BLOOD: 16 MM/HR
HCT VFR BLD AUTO: 34.5 %
HGB BLD-MCNC: 10.8 G/DL
IMM GRANULOCYTES # BLD AUTO: 0.03 X10(3) UL (ref 0–1)
IMM GRANULOCYTES NFR BLD: 0.5 %
LYMPHOCYTES # BLD AUTO: 3.15 X10(3) UL (ref 1–4)
LYMPHOCYTES NFR BLD AUTO: 50.6 %
MCH RBC QN AUTO: 30.8 PG (ref 26–34)
MCHC RBC AUTO-ENTMCNC: 31.3 G/DL (ref 31–37)
MCV RBC AUTO: 98.3 FL
MONOCYTES # BLD AUTO: 0.62 X10(3) UL (ref 0.1–1)
MONOCYTES NFR BLD AUTO: 10 %
NEUTROPHILS # BLD AUTO: 2.24 X10 (3) UL (ref 1.5–7.7)
NEUTROPHILS # BLD AUTO: 2.24 X10(3) UL (ref 1.5–7.7)
NEUTROPHILS NFR BLD AUTO: 35.8 %
PLATELET # BLD AUTO: 220 10(3)UL (ref 150–450)
RBC # BLD AUTO: 3.51 X10(6)UL
WBC # BLD AUTO: 6.2 X10(3) UL (ref 4–11)

## 2020-09-25 PROCEDURE — 84450 TRANSFERASE (AST) (SGOT): CPT

## 2020-09-25 PROCEDURE — 36415 COLL VENOUS BLD VENIPUNCTURE: CPT

## 2020-09-25 PROCEDURE — 85025 COMPLETE CBC W/AUTO DIFF WBC: CPT

## 2020-09-25 PROCEDURE — 82040 ASSAY OF SERUM ALBUMIN: CPT

## 2020-09-25 PROCEDURE — 82565 ASSAY OF CREATININE: CPT

## 2020-09-25 PROCEDURE — 86140 C-REACTIVE PROTEIN: CPT

## 2020-09-25 PROCEDURE — 84460 ALANINE AMINO (ALT) (SGPT): CPT

## 2020-09-25 PROCEDURE — 85652 RBC SED RATE AUTOMATED: CPT

## 2020-09-30 RX ORDER — PREDNISONE 10 MG/1
TABLET ORAL
Qty: 21 TABLET | Refills: 0 | Status: SHIPPED | OUTPATIENT
Start: 2020-09-30 | End: 2020-10-05

## 2020-09-30 NOTE — TELEPHONE ENCOUNTER
LOV: 7/8/20  Last Refilled:#21, 0rfs  9/17/20    Future Appointments   Date Time Provider Yamileth Hoang   10/1/2020  1:45 PM Sarwat Fuentes. Otoniel Subramanian [address]   10/5/2020 10:00 AM Eris Cordoba MD [facility]   10/7/2020  9:45 AM Alfie Yin As

## 2020-10-04 ENCOUNTER — LAB ENCOUNTER (OUTPATIENT)
Dept: LAB | Facility: HOSPITAL | Age: 65
End: 2020-10-04
Attending: INTERNAL MEDICINE
Payer: COMMERCIAL

## 2020-10-04 DIAGNOSIS — Z01.818 PREOP TESTING: ICD-10-CM

## 2020-10-05 ENCOUNTER — OFFICE VISIT (OUTPATIENT)
Dept: RHEUMATOLOGY | Facility: CLINIC | Age: 65
End: 2020-10-05
Payer: COMMERCIAL

## 2020-10-05 VITALS
DIASTOLIC BLOOD PRESSURE: 85 MMHG | HEIGHT: 63.78 IN | WEIGHT: 167 LBS | HEART RATE: 71 BPM | SYSTOLIC BLOOD PRESSURE: 134 MMHG | BODY MASS INDEX: 28.86 KG/M2

## 2020-10-05 DIAGNOSIS — R76.12 POSITIVE QUANTIFERON-TB GOLD TEST: ICD-10-CM

## 2020-10-05 DIAGNOSIS — G89.29 CHRONIC PAIN OF BOTH KNEES: ICD-10-CM

## 2020-10-05 DIAGNOSIS — M25.562 CHRONIC PAIN OF BOTH KNEES: ICD-10-CM

## 2020-10-05 DIAGNOSIS — M05.9 SEROPOSITIVE RHEUMATOID ARTHRITIS (HCC): Primary | ICD-10-CM

## 2020-10-05 DIAGNOSIS — M25.561 CHRONIC PAIN OF BOTH KNEES: ICD-10-CM

## 2020-10-05 DIAGNOSIS — Z51.81 THERAPEUTIC DRUG MONITORING: ICD-10-CM

## 2020-10-05 PROCEDURE — 99214 OFFICE O/P EST MOD 30 MIN: CPT | Performed by: INTERNAL MEDICINE

## 2020-10-05 PROCEDURE — 20610 DRAIN/INJ JOINT/BURSA W/O US: CPT | Performed by: INTERNAL MEDICINE

## 2020-10-05 PROCEDURE — 3079F DIAST BP 80-89 MM HG: CPT | Performed by: INTERNAL MEDICINE

## 2020-10-05 PROCEDURE — 3008F BODY MASS INDEX DOCD: CPT | Performed by: INTERNAL MEDICINE

## 2020-10-05 PROCEDURE — 3075F SYST BP GE 130 - 139MM HG: CPT | Performed by: INTERNAL MEDICINE

## 2020-10-05 RX ORDER — FOLIC ACID 1 MG/1
1 TABLET ORAL DAILY
Qty: 90 TABLET | Refills: 0 | Status: SHIPPED | OUTPATIENT
Start: 2020-10-05 | End: 2021-01-08

## 2020-10-05 RX ORDER — TRIAMCINOLONE ACETONIDE 40 MG/ML
40 INJECTION, SUSPENSION INTRA-ARTICULAR; INTRAMUSCULAR
Status: COMPLETED | OUTPATIENT
Start: 2020-10-05 | End: 2020-10-05

## 2020-10-05 RX ADMIN — TRIAMCINOLONE ACETONIDE 40 MG: 40 INJECTION, SUSPENSION INTRA-ARTICULAR; INTRAMUSCULAR at 10:35:00

## 2020-10-05 RX ADMIN — TRIAMCINOLONE ACETONIDE 40 MG: 40 INJECTION, SUSPENSION INTRA-ARTICULAR; INTRAMUSCULAR at 10:40:00

## 2020-10-05 NOTE — PATIENT INSTRUCTIONS
You were seen today for RA  Blood work in January 2020   Cont humira every 2 weeks  Cont Methotrexate 6 pills weekly and Folic acid daily  Will see of we can get gel injections approved  Follow up in 3 mos

## 2020-10-05 NOTE — PROGRESS NOTES
10/5/2020  [address] [place] [id number]    Dear Jane Almendarez,       Here are your results from your recent visit with Gastroenterology. Your COVID test is negative.       If you need any further assistance, please feel eladio

## 2020-10-05 NOTE — PROCEDURES
With paitent's consent, I injected pt's b/l knee with 1ml lidocaine 1 % and 1 ml kenalog 40. It was done under sterile technique using iodine and alcohol swabs and ethyl chloride was used as an anaesthetic spray. Pt.  tolerated it well.

## 2020-10-05 NOTE — PROGRESS NOTES
Link Jarrett is a 62year old female. HPI:   Patient presents with: Follow - Up      I had the pleasure of seeing Link Jarrett on 10/5/2020 for follow up Seropositve RA (+RF and CCP).      Current Medications:  Humira e 20 MG Oral Tab Take 1 tablet (20 mg total) by mouth 2 (two) times daily. 60 tablet 3   • losartan Potassium 50 MG Oral Tab Take by mouth daily. • benzonatate 200 MG Oral Cap Take 200 mg by mouth 3 (three) times daily as needed for cough.      • Adalimum palpation. NEURO: Cranial nerves II-XII intact grossly. 5/5 strength throughout in both upper and lower extremities, sensation intact.   PSYCH: normal mood       LABS:     May 2019:  Vitamin D 48, Hgb 9.2  RF 17.9, ESR 26, CRP 6.9 mg/L    Previous rheumato

## 2020-10-07 ENCOUNTER — HOSPITAL ENCOUNTER (OUTPATIENT)
Facility: HOSPITAL | Age: 65
Setting detail: HOSPITAL OUTPATIENT SURGERY
Discharge: HOME | End: 2020-10-07
Attending: INTERNAL MEDICINE | Admitting: INTERNAL MEDICINE
Payer: COMMERCIAL

## 2020-10-07 DIAGNOSIS — K21.9 GASTROESOPHAGEAL REFLUX DISEASE, ESOPHAGITIS PRESENCE NOT SPECIFIED: ICD-10-CM

## 2020-10-07 DIAGNOSIS — D13.5 ADENOMYOMA OF GALLBLADDER: ICD-10-CM

## 2020-10-07 DIAGNOSIS — Z01.818 PREOP TESTING: Primary | ICD-10-CM

## 2020-10-07 DIAGNOSIS — K21.9 GASTROESOPHAGEAL REFLUX DISEASE: ICD-10-CM

## 2020-10-07 DIAGNOSIS — R19.4 CHANGE IN BOWEL HABITS: ICD-10-CM

## 2020-10-07 PROCEDURE — 88305 TISSUE EXAM BY PATHOLOGIST: CPT | Performed by: INTERNAL MEDICINE

## 2020-10-07 PROCEDURE — 0DB68ZX EXCISION OF STOMACH, VIA NATURAL OR ARTIFICIAL OPENING ENDOSCOPIC, DIAGNOSTIC: ICD-10-PCS | Performed by: INTERNAL MEDICINE

## 2020-10-07 PROCEDURE — 99153 MOD SED SAME PHYS/QHP EA: CPT | Performed by: INTERNAL MEDICINE

## 2020-10-07 PROCEDURE — 82962 GLUCOSE BLOOD TEST: CPT

## 2020-10-07 PROCEDURE — 0DB28ZX EXCISION OF MIDDLE ESOPHAGUS, VIA NATURAL OR ARTIFICIAL OPENING ENDOSCOPIC, DIAGNOSTIC: ICD-10-PCS | Performed by: INTERNAL MEDICINE

## 2020-10-07 PROCEDURE — 0DJD8ZZ INSPECTION OF LOWER INTESTINAL TRACT, VIA NATURAL OR ARTIFICIAL OPENING ENDOSCOPIC: ICD-10-PCS | Performed by: INTERNAL MEDICINE

## 2020-10-07 PROCEDURE — 88312 SPECIAL STAINS GROUP 1: CPT | Performed by: INTERNAL MEDICINE

## 2020-10-07 PROCEDURE — 36415 COLL VENOUS BLD VENIPUNCTURE: CPT | Performed by: INTERNAL MEDICINE

## 2020-10-07 PROCEDURE — 99152 MOD SED SAME PHYS/QHP 5/>YRS: CPT | Performed by: INTERNAL MEDICINE

## 2020-10-07 PROCEDURE — 0DB88ZX EXCISION OF SMALL INTESTINE, VIA NATURAL OR ARTIFICIAL OPENING ENDOSCOPIC, DIAGNOSTIC: ICD-10-PCS | Performed by: INTERNAL MEDICINE

## 2020-10-07 RX ORDER — SODIUM CHLORIDE 0.9 % (FLUSH) 0.9 %
10 SYRINGE (ML) INJECTION AS NEEDED
Status: DISCONTINUED | OUTPATIENT
Start: 2020-10-07 | End: 2020-10-07

## 2020-10-07 RX ORDER — MIDAZOLAM HYDROCHLORIDE 1 MG/ML
1 INJECTION INTRAMUSCULAR; INTRAVENOUS EVERY 5 MIN PRN
Status: DISCONTINUED | OUTPATIENT
Start: 2020-10-07 | End: 2020-10-07

## 2020-10-07 RX ORDER — SODIUM CHLORIDE, SODIUM LACTATE, POTASSIUM CHLORIDE, CALCIUM CHLORIDE 600; 310; 30; 20 MG/100ML; MG/100ML; MG/100ML; MG/100ML
INJECTION, SOLUTION INTRAVENOUS CONTINUOUS
Status: DISCONTINUED | OUTPATIENT
Start: 2020-10-07 | End: 2020-10-07

## 2020-10-07 RX ORDER — MIDAZOLAM HYDROCHLORIDE 1 MG/ML
INJECTION INTRAMUSCULAR; INTRAVENOUS
Status: DISCONTINUED | OUTPATIENT
Start: 2020-10-07 | End: 2020-10-07

## 2020-10-07 NOTE — H&P
REFERRING PHYSICIAN: Dr. Daisy Duffy  Occupation: Unemployed     HPI:   Batool Herrera is a 62year old female.      Patient presents with:  Stomach Pain  Bloating  Change of Bowel Habits     This is a 63 y/o female with pmh sig for RA on Humir Take 1 tablet (1 mg total) by mouth daily. 90 tablet 0   • FEROSUL 325 (65 Fe) MG Oral Tab TK 1 T PO D WITH YULIANA       • hydrochlorothiazide 12.5 MG Oral Cap TK 1 C PO QAM       • metFORMIN HCl 500 MG Oral Tab Take 500 mg by mouth daily.    1            Past tender,FROM of the back  EXTREMITIES: no cyanosis, clubbing or edema  NEURO: Oriented times three, cranial nerves are intact, motor and sensory are grossly intact     ASSESSMENT AND PLAN:      1. GERD  2.   Occasional dysphagia - likely GERD related  -no p

## 2020-10-07 NOTE — OPERATIVE REPORT
EGD/Colonoscopy Operative Report    [facility] Patient Status:  Hospital Outpatient Surgery    1955 MRN [id number]   Location [facility] ENDOSCOPY LAB SUITES Attending Karina Curry MD   Hosp Day #   0 PCP patient. The patient tolerated the procedure well with no immediate complications. The patient was then repositioned for colonoscopy.   After careful digital rectal examination, the Adult colonoscope was inserted into the rectum and advanced to the level of

## 2020-10-08 VITALS
HEIGHT: 64 IN | DIASTOLIC BLOOD PRESSURE: 92 MMHG | WEIGHT: 166 LBS | SYSTOLIC BLOOD PRESSURE: 151 MMHG | BODY MASS INDEX: 28.34 KG/M2 | TEMPERATURE: 98 F | OXYGEN SATURATION: 100 % | HEART RATE: 59 BPM | RESPIRATION RATE: 16 BRPM

## 2020-10-09 NOTE — PROGRESS NOTES
10/8/2020  [address] [place] [phone]    Dear Julia Willett,       Here are the biopsy/pathology findings from your recent EGD (Upper  Endoscopy):    No abnormalities.   Your biopsies were negative for celiac disease and nega

## 2020-11-20 ENCOUNTER — HOSPITAL ENCOUNTER (OUTPATIENT)
Dept: ULTRASOUND IMAGING | Age: 65
Discharge: HOME | End: 2020-11-20
Attending: INTERNAL MEDICINE
Payer: COMMERCIAL

## 2020-11-20 DIAGNOSIS — D13.5 ADENOMYOMA OF GALLBLADDER: ICD-10-CM

## 2020-11-20 PROCEDURE — 76705 ECHO EXAM OF ABDOMEN: CPT | Performed by: INTERNAL MEDICINE

## 2020-11-24 NOTE — PROGRESS NOTES
11/24/2020  [address] [place] [phone]    Dear Luiz Corbin,       Here are your results from your recent visit with Gastroenterology. Your Ultrasound revealed an abnormal appearance to the gallbladder.   Also, there is

## 2020-11-25 NOTE — PROGRESS NOTES
Called pt and spoke to pt's friend Surya who translated message from Dr. Gisselle Steward. Provided her with [facility] radiology number [phone] to schedule MRI liver and also [facility] surgery group ph#[phone] to schedule consultation.  Verbalized understanding and w

## 2020-12-11 ENCOUNTER — HOSPITAL ENCOUNTER (OUTPATIENT)
Dept: MRI IMAGING | Facility: HOSPITAL | Age: 65
Discharge: HOME | End: 2020-12-11
Attending: INTERNAL MEDICINE
Payer: COMMERCIAL

## 2020-12-11 DIAGNOSIS — R10.11 RUQ PAIN: ICD-10-CM

## 2020-12-11 DIAGNOSIS — K76.9 LIVER LESION: ICD-10-CM

## 2020-12-11 PROCEDURE — A9575 INJ GADOTERATE MEGLUMI 0.1ML: HCPCS | Performed by: INTERNAL MEDICINE

## 2020-12-11 PROCEDURE — 82565 ASSAY OF CREATININE: CPT

## 2020-12-11 PROCEDURE — 74183 MRI ABD W/O CNTR FLWD CNTR: CPT | Performed by: INTERNAL MEDICINE

## 2020-12-15 NOTE — PROGRESS NOTES
12/15/2020  [address] [place] [phone]    Dear Layne Ku,       Here are your results from your recent visit with Gastroenterology. Your MRI of the liver shows a benign hemangioma.   This is a benign abnormal blood ve

## 2020-12-29 ENCOUNTER — LAB ENCOUNTER (OUTPATIENT)
Dept: LAB | Facility: HOSPITAL | Age: 65
End: 2020-12-29
Attending: INTERNAL MEDICINE
Payer: COMMERCIAL

## 2020-12-29 DIAGNOSIS — Z51.81 THERAPEUTIC DRUG MONITORING: ICD-10-CM

## 2020-12-29 DIAGNOSIS — M25.561 CHRONIC PAIN OF BOTH KNEES: ICD-10-CM

## 2020-12-29 DIAGNOSIS — M25.562 CHRONIC PAIN OF BOTH KNEES: ICD-10-CM

## 2020-12-29 DIAGNOSIS — M05.79 SEROPOSITIVE RHEUMATOID ARTHRITIS OF MULTIPLE SITES (HCC): ICD-10-CM

## 2020-12-29 DIAGNOSIS — G89.29 CHRONIC PAIN OF BOTH KNEES: ICD-10-CM

## 2020-12-29 DIAGNOSIS — Z51.81 ENCOUNTER FOR THERAPEUTIC DRUG MONITORING: ICD-10-CM

## 2020-12-29 DIAGNOSIS — M05.9 SEROPOSITIVE RHEUMATOID ARTHRITIS (HCC): ICD-10-CM

## 2020-12-29 LAB
ALBUMIN SERPL-MCNC: 3.5 G/DL (ref 3.4–5)
ALT SERPL-CCNC: 14 U/L
AST SERPL-CCNC: 13 U/L (ref 15–37)
BASOPHILS # BLD AUTO: 0.01 X10(3) UL (ref 0–0.2)
BASOPHILS NFR BLD AUTO: 0.2 %
CREAT BLD-MCNC: 0.99 MG/DL
CRP SERPL-MCNC: <0.29 MG/DL (ref ?–0.3)
DEPRECATED RDW RBC AUTO: 54.4 FL (ref 35.1–46.3)
EOSINOPHIL # BLD AUTO: 0.08 X10(3) UL (ref 0–0.7)
EOSINOPHIL NFR BLD AUTO: 1.7 %
ERYTHROCYTE [DISTWIDTH] IN BLOOD BY AUTOMATED COUNT: 15.1 % (ref 11–15)
ERYTHROCYTE [SEDIMENTATION RATE] IN BLOOD: 15 MM/HR
HCT VFR BLD AUTO: 34.1 %
HGB BLD-MCNC: 10.7 G/DL
IMM GRANULOCYTES # BLD AUTO: 0.02 X10(3) UL (ref 0–1)
IMM GRANULOCYTES NFR BLD: 0.4 %
LYMPHOCYTES # BLD AUTO: 2.13 X10(3) UL (ref 1–4)
LYMPHOCYTES NFR BLD AUTO: 44.4 %
MCH RBC QN AUTO: 31 PG (ref 26–34)
MCHC RBC AUTO-ENTMCNC: 31.4 G/DL (ref 31–37)
MCV RBC AUTO: 98.8 FL
MONOCYTES # BLD AUTO: 0.48 X10(3) UL (ref 0.1–1)
MONOCYTES NFR BLD AUTO: 10 %
NEUTROPHILS # BLD AUTO: 2.08 X10 (3) UL (ref 1.5–7.7)
NEUTROPHILS # BLD AUTO: 2.08 X10(3) UL (ref 1.5–7.7)
NEUTROPHILS NFR BLD AUTO: 43.3 %
PLATELET # BLD AUTO: 205 10(3)UL (ref 150–450)
RBC # BLD AUTO: 3.45 X10(6)UL
WBC # BLD AUTO: 4.8 X10(3) UL (ref 4–11)

## 2020-12-29 PROCEDURE — 85652 RBC SED RATE AUTOMATED: CPT

## 2020-12-29 PROCEDURE — 82565 ASSAY OF CREATININE: CPT

## 2020-12-29 PROCEDURE — 85025 COMPLETE CBC W/AUTO DIFF WBC: CPT

## 2020-12-29 PROCEDURE — 82040 ASSAY OF SERUM ALBUMIN: CPT

## 2020-12-29 PROCEDURE — 86140 C-REACTIVE PROTEIN: CPT

## 2020-12-29 PROCEDURE — 36415 COLL VENOUS BLD VENIPUNCTURE: CPT

## 2020-12-29 PROCEDURE — 84460 ALANINE AMINO (ALT) (SGPT): CPT

## 2020-12-29 PROCEDURE — 84450 TRANSFERASE (AST) (SGOT): CPT

## 2021-01-08 ENCOUNTER — TELEPHONE (OUTPATIENT)
Dept: RHEUMATOLOGY | Facility: CLINIC | Age: 66
End: 2021-01-08

## 2021-01-08 ENCOUNTER — OFFICE VISIT (OUTPATIENT)
Dept: RHEUMATOLOGY | Facility: CLINIC | Age: 66
End: 2021-01-08
Payer: COMMERCIAL

## 2021-01-08 ENCOUNTER — TELEPHONE (OUTPATIENT)
Dept: INTERNAL MEDICINE CLINIC | Facility: CLINIC | Age: 66
End: 2021-01-08

## 2021-01-08 VITALS
BODY MASS INDEX: 29.41 KG/M2 | SYSTOLIC BLOOD PRESSURE: 163 MMHG | WEIGHT: 166 LBS | DIASTOLIC BLOOD PRESSURE: 84 MMHG | HEIGHT: 63 IN | HEART RATE: 75 BPM

## 2021-01-08 DIAGNOSIS — M25.562 CHRONIC PAIN OF BOTH KNEES: ICD-10-CM

## 2021-01-08 DIAGNOSIS — R76.12 POSITIVE QUANTIFERON-TB GOLD TEST: ICD-10-CM

## 2021-01-08 DIAGNOSIS — Z51.81 THERAPEUTIC DRUG MONITORING: ICD-10-CM

## 2021-01-08 DIAGNOSIS — M05.9 SEROPOSITIVE RHEUMATOID ARTHRITIS (HCC): Primary | ICD-10-CM

## 2021-01-08 DIAGNOSIS — M25.561 CHRONIC PAIN OF BOTH KNEES: ICD-10-CM

## 2021-01-08 DIAGNOSIS — G89.29 CHRONIC PAIN OF BOTH KNEES: ICD-10-CM

## 2021-01-08 PROCEDURE — 3077F SYST BP >= 140 MM HG: CPT | Performed by: INTERNAL MEDICINE

## 2021-01-08 PROCEDURE — 20610 DRAIN/INJ JOINT/BURSA W/O US: CPT | Performed by: INTERNAL MEDICINE

## 2021-01-08 PROCEDURE — 3079F DIAST BP 80-89 MM HG: CPT | Performed by: INTERNAL MEDICINE

## 2021-01-08 PROCEDURE — 3008F BODY MASS INDEX DOCD: CPT | Performed by: INTERNAL MEDICINE

## 2021-01-08 PROCEDURE — 99214 OFFICE O/P EST MOD 30 MIN: CPT | Performed by: INTERNAL MEDICINE

## 2021-01-08 RX ORDER — TRIAMCINOLONE ACETONIDE 40 MG/ML
40 INJECTION, SUSPENSION INTRA-ARTICULAR; INTRAMUSCULAR
Status: COMPLETED | OUTPATIENT
Start: 2021-01-08 | End: 2021-01-08

## 2021-01-08 RX ORDER — FOLIC ACID 1 MG/1
1 TABLET ORAL DAILY
Qty: 90 TABLET | Refills: 1 | Status: SHIPPED | OUTPATIENT
Start: 2021-01-08 | End: 2021-08-13

## 2021-01-08 RX ADMIN — TRIAMCINOLONE ACETONIDE 40 MG: 40 INJECTION, SUSPENSION INTRA-ARTICULAR; INTRAMUSCULAR at 08:45:00

## 2021-01-08 RX ADMIN — TRIAMCINOLONE ACETONIDE 40 MG: 40 INJECTION, SUSPENSION INTRA-ARTICULAR; INTRAMUSCULAR at 08:40:00

## 2021-01-08 NOTE — TELEPHONE ENCOUNTER
[employer] calling to let the nurse know that she did contact [employer] for approval for med Methroexate 2.5mg.

## 2021-01-08 NOTE — PROGRESS NOTES
Parveen Martinez is a 60year old female.     HPI:   Patient presents with:  Medication Follow-Up  Knee Pain: B/L knee pain, feels bone on bone      I had the pleasure of seeing Parveen Martinez on 1/8/2021 for follow up Seropositv total) by mouth daily. Before meal 30 capsule 5   • methotrexate 2.5 MG Oral Tab Take 6 tablets (15 mg total) by mouth once a week. TK 6 TAB WEEKLY ALL ON SAME DAY 72 tablet 0   • folic acid 1 MG Oral Tab Take 1 tablet (1 mg total) by mouth daily.  90 table no lumbar or sacral pain on palpation. NEURO: Cranial nerves II-XII intact grossly. 5/5 strength throughout in both upper and lower extremities, sensation intact.   PSYCH: normal mood       LABS:     May 2019:  Vitamin D 48, Hgb 9.2  RF 17.9, ESR 26, CRP 6

## 2021-01-08 NOTE — TELEPHONE ENCOUNTER
Called Gabe Burroughs. Left voicemail . Pt needs PA for methotrexate , [facility] pharmacy .

## 2021-01-08 NOTE — TELEPHONE ENCOUNTER
Spoke with pt  With lang line assist Claire Mishra #[phone] for Irish.    Pt agrees to appt for Synvisc injection

## 2021-01-08 NOTE — PATIENT INSTRUCTIONS
You were seen for rheumatoid arthritis  Your blood work is stable  Continue Humira and methotrexate  We injected your knees with cortisone today  We will try to get you approved for the gel injections, once approved we will have you come in for the injecti

## 2021-01-08 NOTE — TELEPHONE ENCOUNTER
Called [employer], spoke with Jose E Comer Referral regarding Bilat synvisc injection for pt. Referral provided  #[id number].  Will fax referral for chart

## 2021-01-08 NOTE — TELEPHONE ENCOUNTER
Current Outpatient Medications   Medication Sig Dispense Refill   • methotrexate 2.5 MG Oral Tab Take 6 tablets (15 mg total) by mouth once a week. TK 6 TAB WEEKLY ALL ON SAME DAY 72 tablet 1       Plan does not cover medication.  Please call plan at [phone]

## 2021-02-19 ENCOUNTER — OFFICE VISIT (OUTPATIENT)
Dept: RHEUMATOLOGY | Facility: CLINIC | Age: 66
End: 2021-02-19
Payer: COMMERCIAL

## 2021-02-19 VITALS
DIASTOLIC BLOOD PRESSURE: 90 MMHG | HEIGHT: 63 IN | SYSTOLIC BLOOD PRESSURE: 144 MMHG | BODY MASS INDEX: 29.41 KG/M2 | WEIGHT: 166 LBS | HEART RATE: 69 BPM

## 2021-02-19 DIAGNOSIS — M25.562 CHRONIC PAIN OF BOTH KNEES: Primary | ICD-10-CM

## 2021-02-19 DIAGNOSIS — M05.9 SEROPOSITIVE RHEUMATOID ARTHRITIS (HCC): ICD-10-CM

## 2021-02-19 DIAGNOSIS — Z51.81 THERAPEUTIC DRUG MONITORING: ICD-10-CM

## 2021-02-19 DIAGNOSIS — M25.561 CHRONIC PAIN OF BOTH KNEES: Primary | ICD-10-CM

## 2021-02-19 DIAGNOSIS — G89.29 CHRONIC PAIN OF BOTH KNEES: Primary | ICD-10-CM

## 2021-02-19 PROCEDURE — 3080F DIAST BP >= 90 MM HG: CPT | Performed by: INTERNAL MEDICINE

## 2021-02-19 PROCEDURE — 20610 DRAIN/INJ JOINT/BURSA W/O US: CPT | Performed by: INTERNAL MEDICINE

## 2021-02-19 PROCEDURE — 99213 OFFICE O/P EST LOW 20 MIN: CPT | Performed by: INTERNAL MEDICINE

## 2021-02-19 PROCEDURE — 3077F SYST BP >= 140 MM HG: CPT | Performed by: INTERNAL MEDICINE

## 2021-02-19 PROCEDURE — 3008F BODY MASS INDEX DOCD: CPT | Performed by: INTERNAL MEDICINE

## 2021-02-19 NOTE — PATIENT INSTRUCTIONS
You are seen today for bilateral knee pain due to rheumatoid arthritis and osteoarthritis  We injected both knees with Synvisc, hopefully that will help  Continue Humira and methotrexate  Blood work in April   Follow-up in 3 months

## 2021-02-19 NOTE — PROGRESS NOTES
Teresa Jorge is a 60year old female. HPI:   Patient presents with:   Follow - Up  Knee Pain: Synvisc one injections      I had the pleasure of seeing Teresa Jorge on 2/19/2021 for follow up Seropositve RA (+RF and CCP) tablets (15 mg total) by mouth once a week. TK 6 TAB WEEKLY ALL ON SAME DAY 72 tablet 1   • folic acid 1 MG Oral Tab Take 1 tablet (1 mg total) by mouth daily.  90 tablet 1   • omeprazole 20 MG Oral Capsule Delayed Release Take 1 capsule (20 mg total) by mo on palpation with FROM  Spine: no lumbar or sacral pain on palpation. NEURO: Cranial nerves II-XII intact grossly. 5/5 strength throughout in both upper and lower extremities, sensation intact.   PSYCH: normal mood       LABS:     May 2019:  Vitamin D 48,

## 2021-02-19 NOTE — PROCEDURES
With paitent's consent, I injected pt's B/L knees with Synvisc One. It was done under sterile technique using iodine and alcohol swabs and ethyl chloride was used as an anaesthetic spray. Pt.  tolerated it well.

## 2021-03-05 ENCOUNTER — LAB ENCOUNTER (OUTPATIENT)
Dept: LAB | Facility: HOSPITAL | Age: 66
End: 2021-03-05
Attending: SURGERY
Payer: COMMERCIAL

## 2021-03-05 ENCOUNTER — TELEPHONE (OUTPATIENT)
Dept: RHEUMATOLOGY | Facility: CLINIC | Age: 66
End: 2021-03-05

## 2021-03-05 DIAGNOSIS — D13.4: ICD-10-CM

## 2021-03-05 LAB
ALBUMIN SERPL-MCNC: 3.9 G/DL (ref 3.4–5)
ALBUMIN/GLOB SERPL: 1.2 {RATIO} (ref 1–2)
ALP LIVER SERPL-CCNC: 84 U/L
ALT SERPL-CCNC: 20 U/L
ANION GAP SERPL CALC-SCNC: 3 MMOL/L (ref 0–18)
AST SERPL-CCNC: 13 U/L (ref 15–37)
BASOPHILS # BLD AUTO: 0.01 X10(3) UL (ref 0–0.2)
BASOPHILS NFR BLD AUTO: 0.2 %
BILIRUB SERPL-MCNC: 0.4 MG/DL (ref 0.1–2)
BUN BLD-MCNC: 25 MG/DL (ref 7–18)
BUN/CREAT SERPL: 22.3 (ref 10–20)
CALCIUM BLD-MCNC: 9.6 MG/DL (ref 8.5–10.1)
CHLORIDE SERPL-SCNC: 109 MMOL/L (ref 98–112)
CO2 SERPL-SCNC: 30 MMOL/L (ref 21–32)
CREAT BLD-MCNC: 1.12 MG/DL
DEPRECATED RDW RBC AUTO: 55.7 FL (ref 35.1–46.3)
EOSINOPHIL # BLD AUTO: 0.05 X10(3) UL (ref 0–0.7)
EOSINOPHIL NFR BLD AUTO: 1 %
ERYTHROCYTE [DISTWIDTH] IN BLOOD BY AUTOMATED COUNT: 15.3 % (ref 11–15)
EST. AVERAGE GLUCOSE BLD GHB EST-MCNC: 137 MG/DL (ref 68–126)
GLOBULIN PLAS-MCNC: 3.3 G/DL (ref 2.8–4.4)
GLUCOSE BLD-MCNC: 86 MG/DL (ref 70–99)
HBA1C MFR BLD HPLC: 6.4 % (ref ?–5.7)
HCT VFR BLD AUTO: 33.8 %
HGB BLD-MCNC: 10.7 G/DL
IMM GRANULOCYTES # BLD AUTO: 0 X10(3) UL (ref 0–1)
IMM GRANULOCYTES NFR BLD: 0 %
LYMPHOCYTES # BLD AUTO: 1.96 X10(3) UL (ref 1–4)
LYMPHOCYTES NFR BLD AUTO: 37.6 %
M PROTEIN MFR SERPL ELPH: 7.2 G/DL (ref 6.4–8.2)
MCH RBC QN AUTO: 31.2 PG (ref 26–34)
MCHC RBC AUTO-ENTMCNC: 31.7 G/DL (ref 31–37)
MCV RBC AUTO: 98.5 FL
MONOCYTES # BLD AUTO: 0.41 X10(3) UL (ref 0.1–1)
MONOCYTES NFR BLD AUTO: 7.9 %
NEUTROPHILS # BLD AUTO: 2.78 X10 (3) UL (ref 1.5–7.7)
NEUTROPHILS # BLD AUTO: 2.78 X10(3) UL (ref 1.5–7.7)
NEUTROPHILS NFR BLD AUTO: 53.3 %
OSMOLALITY SERPL CALC.SUM OF ELEC: 298 MOSM/KG (ref 275–295)
PATIENT FASTING Y/N/NP: NO
PLATELET # BLD AUTO: 197 10(3)UL (ref 150–450)
POTASSIUM SERPL-SCNC: 4.1 MMOL/L (ref 3.5–5.1)
RBC # BLD AUTO: 3.43 X10(6)UL
SODIUM SERPL-SCNC: 142 MMOL/L (ref 136–145)
WBC # BLD AUTO: 5.2 X10(3) UL (ref 4–11)

## 2021-03-05 PROCEDURE — 36415 COLL VENOUS BLD VENIPUNCTURE: CPT

## 2021-03-05 PROCEDURE — 85025 COMPLETE CBC W/AUTO DIFF WBC: CPT

## 2021-03-05 PROCEDURE — 83036 HEMOGLOBIN GLYCOSYLATED A1C: CPT

## 2021-03-05 PROCEDURE — 80053 COMPREHEN METABOLIC PANEL: CPT

## 2021-03-05 NOTE — H&P (VIEW-ONLY)
Moustapha Luna is a 60year old  female  Patient presents with:  Consult: Consult gallbladder & RUQ pain      HPI: Moustapha Luna is a 60year old  female.   The patient complains of pain in the ruq , which radiates to the back sonographic surveillance is recommended at an interval not to exceed 6 months to establish stability. 2. A hepatic lesion has imaging features compatible with a hemangioma. 3. Mild hepatomegaly. 4. Lesser incidental findings as above.       Ju Xavier omeprazole 20 MG Oral Capsule Delayed Release Take 1 capsule (20 mg total) by mouth daily. Before meal 30 capsule 5   • Sennosides-Docusate Sodium (SENNA S OR) Take by mouth.      • famoTIDine 20 MG Oral Tab Take 1 tablet (20 mg total) by mouth 2 (two) time supraclavicular adenopathy present  NECK: supple, no JVD  RESPIRATORY: clear to percussion and auscultation, equal chest wall excursions  CARDIOVASCULAR: RRR, good peripheral perfusion  ABDOMEN: normal active BS, soft, non distended, nontender; there is no at [facility] on Thursday, March 25, 2021. We'll obtain laboratory data at this time. Patient stress test in the fall 2020. Elias Ball    Total time spent in direct patient contact and decision-making  And coordinating the patient's care including [facility] t

## 2021-03-05 NOTE — TELEPHONE ENCOUNTER
Pt is having surgery on March 25th. Wondering if she should stop medication for that week? Please call [phone] (her son) Thank you!

## 2021-03-22 ENCOUNTER — LAB ENCOUNTER (OUTPATIENT)
Dept: LAB | Age: 66
End: 2021-03-22
Attending: SURGERY
Payer: OTHER GOVERNMENT

## 2021-03-22 DIAGNOSIS — Z01.818 PREOP TESTING: ICD-10-CM

## 2021-03-23 LAB — SARS-COV-2 RNA RESP QL NAA+PROBE: NOT DETECTED

## 2021-03-24 RX ORDER — POLYETHYLENE GLYCOL 3350 17 G/17G
17 POWDER, FOR SOLUTION ORAL NIGHTLY
COMMUNITY

## 2021-03-25 ENCOUNTER — APPOINTMENT (OUTPATIENT)
Dept: GENERAL RADIOLOGY | Facility: HOSPITAL | Age: 66
End: 2021-03-25
Attending: SURGERY
Payer: COMMERCIAL

## 2021-03-25 ENCOUNTER — ANESTHESIA (OUTPATIENT)
Dept: SURGERY | Facility: HOSPITAL | Age: 66
End: 2021-03-25
Payer: COMMERCIAL

## 2021-03-25 ENCOUNTER — HOSPITAL ENCOUNTER (OUTPATIENT)
Facility: HOSPITAL | Age: 66
Setting detail: HOSPITAL OUTPATIENT SURGERY
Discharge: HOME | End: 2021-03-25
Attending: SURGERY | Admitting: SURGERY
Payer: COMMERCIAL

## 2021-03-25 ENCOUNTER — ANESTHESIA EVENT (OUTPATIENT)
Dept: SURGERY | Facility: HOSPITAL | Age: 66
End: 2021-03-25
Payer: COMMERCIAL

## 2021-03-25 VITALS
SYSTOLIC BLOOD PRESSURE: 124 MMHG | HEIGHT: 63.5 IN | TEMPERATURE: 98 F | BODY MASS INDEX: 29.05 KG/M2 | OXYGEN SATURATION: 97 % | WEIGHT: 166 LBS | DIASTOLIC BLOOD PRESSURE: 75 MMHG | HEART RATE: 76 BPM | RESPIRATION RATE: 16 BRPM

## 2021-03-25 DIAGNOSIS — R52 PAIN: ICD-10-CM

## 2021-03-25 DIAGNOSIS — Z01.818 PREOP TESTING: Primary | ICD-10-CM

## 2021-03-25 DIAGNOSIS — D13.4: ICD-10-CM

## 2021-03-25 LAB
GLUCOSE BLDC GLUCOMTR-MCNC: 105 MG/DL (ref 70–99)
GLUCOSE BLDC GLUCOMTR-MCNC: 194 MG/DL (ref 70–99)

## 2021-03-25 PROCEDURE — 76000 FLUOROSCOPY <1 HR PHYS/QHP: CPT | Performed by: SURGERY

## 2021-03-25 PROCEDURE — 88304 TISSUE EXAM BY PATHOLOGIST: CPT | Performed by: SURGERY

## 2021-03-25 PROCEDURE — 74300 X-RAY BILE DUCTS/PANCREAS: CPT | Performed by: SURGERY

## 2021-03-25 PROCEDURE — 82962 GLUCOSE BLOOD TEST: CPT

## 2021-03-25 PROCEDURE — 8E0W4CZ ROBOTIC ASSISTED PROCEDURE OF TRUNK REGION, PERCUTANEOUS ENDOSCOPIC APPROACH: ICD-10-PCS | Performed by: SURGERY

## 2021-03-25 PROCEDURE — BF13YZZ FLUOROSCOPY OF GALLBLADDER AND BILE DUCTS USING OTHER CONTRAST: ICD-10-PCS | Performed by: SURGERY

## 2021-03-25 PROCEDURE — 0FT44ZZ RESECTION OF GALLBLADDER, PERCUTANEOUS ENDOSCOPIC APPROACH: ICD-10-PCS | Performed by: SURGERY

## 2021-03-25 RX ORDER — MORPHINE SULFATE 4 MG/ML
4 INJECTION, SOLUTION INTRAMUSCULAR; INTRAVENOUS EVERY 2 HOUR PRN
Status: CANCELLED | OUTPATIENT
Start: 2021-03-25

## 2021-03-25 RX ORDER — HALOPERIDOL 5 MG/ML
0.25 INJECTION INTRAMUSCULAR ONCE AS NEEDED
Status: DISCONTINUED | OUTPATIENT
Start: 2021-03-25 | End: 2021-03-25

## 2021-03-25 RX ORDER — HYDROCODONE BITARTRATE AND ACETAMINOPHEN 5; 325 MG/1; MG/1
2 TABLET ORAL AS NEEDED
Status: DISCONTINUED | OUTPATIENT
Start: 2021-03-25 | End: 2021-03-25

## 2021-03-25 RX ORDER — NALOXONE HYDROCHLORIDE 0.4 MG/ML
80 INJECTION, SOLUTION INTRAMUSCULAR; INTRAVENOUS; SUBCUTANEOUS AS NEEDED
Status: DISCONTINUED | OUTPATIENT
Start: 2021-03-25 | End: 2021-03-25

## 2021-03-25 RX ORDER — DEXAMETHASONE SODIUM PHOSPHATE 4 MG/ML
VIAL (ML) INJECTION AS NEEDED
Status: DISCONTINUED | OUTPATIENT
Start: 2021-03-25 | End: 2021-03-25 | Stop reason: SURG

## 2021-03-25 RX ORDER — KETOROLAC TROMETHAMINE 30 MG/ML
INJECTION, SOLUTION INTRAMUSCULAR; INTRAVENOUS AS NEEDED
Status: DISCONTINUED | OUTPATIENT
Start: 2021-03-25 | End: 2021-03-25 | Stop reason: SURG

## 2021-03-25 RX ORDER — HYDROCODONE BITARTRATE AND ACETAMINOPHEN 5; 325 MG/1; MG/1
1 TABLET ORAL AS NEEDED
Status: DISCONTINUED | OUTPATIENT
Start: 2021-03-25 | End: 2021-03-25

## 2021-03-25 RX ORDER — TRAMADOL HYDROCHLORIDE 50 MG/1
50 TABLET ORAL EVERY 6 HOURS PRN
Qty: 8 TABLET | Refills: 0 | Status: SHIPPED | OUTPATIENT
Start: 2021-03-25 | End: 2021-04-09

## 2021-03-25 RX ORDER — KETOROLAC TROMETHAMINE 30 MG/ML
30 INJECTION, SOLUTION INTRAMUSCULAR; INTRAVENOUS EVERY 6 HOURS PRN
Status: CANCELLED | OUTPATIENT
Start: 2021-03-25 | End: 2021-03-27

## 2021-03-25 RX ORDER — ACETAMINOPHEN 500 MG
1000 TABLET ORAL ONCE
Status: COMPLETED | OUTPATIENT
Start: 2021-03-25 | End: 2021-03-25

## 2021-03-25 RX ORDER — DOCUSATE SODIUM 100 MG/1
100 CAPSULE, LIQUID FILLED ORAL 2 TIMES DAILY
Qty: 14 CAPSULE | Refills: 1 | Status: SHIPPED | OUTPATIENT
Start: 2021-03-25 | End: 2021-04-01

## 2021-03-25 RX ORDER — INDOCYANINE GREEN AND WATER 25 MG
5 KIT INJECTION ONCE
Status: COMPLETED | OUTPATIENT
Start: 2021-03-25 | End: 2021-03-25

## 2021-03-25 RX ORDER — PHENYLEPHRINE HCL 10 MG/ML
VIAL (ML) INJECTION AS NEEDED
Status: DISCONTINUED | OUTPATIENT
Start: 2021-03-25 | End: 2021-03-25 | Stop reason: SURG

## 2021-03-25 RX ORDER — MORPHINE SULFATE 10 MG/ML
6 INJECTION, SOLUTION INTRAMUSCULAR; INTRAVENOUS EVERY 10 MIN PRN
Status: DISCONTINUED | OUTPATIENT
Start: 2021-03-25 | End: 2021-03-25

## 2021-03-25 RX ORDER — TRAMADOL HYDROCHLORIDE 50 MG/1
50 TABLET ORAL EVERY 6 HOURS PRN
Status: CANCELLED | OUTPATIENT
Start: 2021-03-25

## 2021-03-25 RX ORDER — LIDOCAINE HYDROCHLORIDE 10 MG/ML
INJECTION, SOLUTION EPIDURAL; INFILTRATION; INTRACAUDAL; PERINEURAL AS NEEDED
Status: DISCONTINUED | OUTPATIENT
Start: 2021-03-25 | End: 2021-03-25 | Stop reason: SURG

## 2021-03-25 RX ORDER — MORPHINE SULFATE 4 MG/ML
4 INJECTION, SOLUTION INTRAMUSCULAR; INTRAVENOUS EVERY 10 MIN PRN
Status: DISCONTINUED | OUTPATIENT
Start: 2021-03-25 | End: 2021-03-25

## 2021-03-25 RX ORDER — PROCHLORPERAZINE EDISYLATE 5 MG/ML
5 INJECTION INTRAMUSCULAR; INTRAVENOUS ONCE AS NEEDED
Status: DISCONTINUED | OUTPATIENT
Start: 2021-03-25 | End: 2021-03-25

## 2021-03-25 RX ORDER — SODIUM CHLORIDE, SODIUM LACTATE, POTASSIUM CHLORIDE, CALCIUM CHLORIDE 600; 310; 30; 20 MG/100ML; MG/100ML; MG/100ML; MG/100ML
INJECTION, SOLUTION INTRAVENOUS CONTINUOUS
Status: DISCONTINUED | OUTPATIENT
Start: 2021-03-25 | End: 2021-03-25

## 2021-03-25 RX ORDER — ROCURONIUM BROMIDE 10 MG/ML
INJECTION, SOLUTION INTRAVENOUS AS NEEDED
Status: DISCONTINUED | OUTPATIENT
Start: 2021-03-25 | End: 2021-03-25 | Stop reason: SURG

## 2021-03-25 RX ORDER — CEFAZOLIN SODIUM/WATER 2 G/20 ML
2 SYRINGE (ML) INTRAVENOUS ONCE
Status: COMPLETED | OUTPATIENT
Start: 2021-03-25 | End: 2021-03-25

## 2021-03-25 RX ORDER — KETOROLAC TROMETHAMINE 30 MG/ML
15 INJECTION, SOLUTION INTRAMUSCULAR; INTRAVENOUS EVERY 6 HOURS PRN
Status: CANCELLED | OUTPATIENT
Start: 2021-03-25 | End: 2021-03-27

## 2021-03-25 RX ORDER — HYDROMORPHONE HYDROCHLORIDE 1 MG/ML
0.2 INJECTION, SOLUTION INTRAMUSCULAR; INTRAVENOUS; SUBCUTANEOUS EVERY 5 MIN PRN
Status: DISCONTINUED | OUTPATIENT
Start: 2021-03-25 | End: 2021-03-25

## 2021-03-25 RX ORDER — MORPHINE SULFATE 4 MG/ML
6 INJECTION, SOLUTION INTRAMUSCULAR; INTRAVENOUS EVERY 2 HOUR PRN
Status: CANCELLED | OUTPATIENT
Start: 2021-03-25

## 2021-03-25 RX ORDER — MORPHINE SULFATE 4 MG/ML
2 INJECTION, SOLUTION INTRAMUSCULAR; INTRAVENOUS EVERY 10 MIN PRN
Status: DISCONTINUED | OUTPATIENT
Start: 2021-03-25 | End: 2021-03-25

## 2021-03-25 RX ORDER — MIDAZOLAM HYDROCHLORIDE 1 MG/ML
INJECTION INTRAMUSCULAR; INTRAVENOUS AS NEEDED
Status: DISCONTINUED | OUTPATIENT
Start: 2021-03-25 | End: 2021-03-25 | Stop reason: SURG

## 2021-03-25 RX ORDER — ONDANSETRON 2 MG/ML
8 INJECTION INTRAMUSCULAR; INTRAVENOUS EVERY 6 HOURS PRN
Status: CANCELLED | OUTPATIENT
Start: 2021-03-25

## 2021-03-25 RX ORDER — HYDROMORPHONE HYDROCHLORIDE 1 MG/ML
0.4 INJECTION, SOLUTION INTRAMUSCULAR; INTRAVENOUS; SUBCUTANEOUS EVERY 5 MIN PRN
Status: DISCONTINUED | OUTPATIENT
Start: 2021-03-25 | End: 2021-03-25

## 2021-03-25 RX ORDER — ONDANSETRON 2 MG/ML
INJECTION INTRAMUSCULAR; INTRAVENOUS AS NEEDED
Status: DISCONTINUED | OUTPATIENT
Start: 2021-03-25 | End: 2021-03-25 | Stop reason: SURG

## 2021-03-25 RX ORDER — HYDROMORPHONE HYDROCHLORIDE 1 MG/ML
0.6 INJECTION, SOLUTION INTRAMUSCULAR; INTRAVENOUS; SUBCUTANEOUS EVERY 5 MIN PRN
Status: DISCONTINUED | OUTPATIENT
Start: 2021-03-25 | End: 2021-03-25

## 2021-03-25 RX ORDER — ONDANSETRON 2 MG/ML
4 INJECTION INTRAMUSCULAR; INTRAVENOUS ONCE AS NEEDED
Status: DISCONTINUED | OUTPATIENT
Start: 2021-03-25 | End: 2021-03-25

## 2021-03-25 RX ORDER — DEXTROSE MONOHYDRATE 25 G/50ML
50 INJECTION, SOLUTION INTRAVENOUS
Status: DISCONTINUED | OUTPATIENT
Start: 2021-03-25 | End: 2021-03-25

## 2021-03-25 RX ORDER — MORPHINE SULFATE 4 MG/ML
2 INJECTION, SOLUTION INTRAMUSCULAR; INTRAVENOUS EVERY 2 HOUR PRN
Status: CANCELLED | OUTPATIENT
Start: 2021-03-25

## 2021-03-25 RX ORDER — BUPIVACAINE HYDROCHLORIDE AND EPINEPHRINE 2.5; 5 MG/ML; UG/ML
INJECTION, SOLUTION INFILTRATION; PERINEURAL AS NEEDED
Status: DISCONTINUED | OUTPATIENT
Start: 2021-03-25 | End: 2021-03-25 | Stop reason: HOSPADM

## 2021-03-25 RX ADMIN — ROCURONIUM BROMIDE 45 MG: 10 INJECTION, SOLUTION INTRAVENOUS at 07:46:00

## 2021-03-25 RX ADMIN — PHENYLEPHRINE HCL 100 MCG: 10 MG/ML VIAL (ML) INJECTION at 08:42:00

## 2021-03-25 RX ADMIN — KETOROLAC TROMETHAMINE 30 MG: 30 INJECTION, SOLUTION INTRAMUSCULAR; INTRAVENOUS at 09:13:00

## 2021-03-25 RX ADMIN — PHENYLEPHRINE HCL 100 MCG: 10 MG/ML VIAL (ML) INJECTION at 08:04:00

## 2021-03-25 RX ADMIN — CEFAZOLIN SODIUM/WATER 2 G: 2 G/20 ML SYRINGE (ML) INTRAVENOUS at 07:40:00

## 2021-03-25 RX ADMIN — DEXAMETHASONE SODIUM PHOSPHATE 4 MG: 4 MG/ML VIAL (ML) INJECTION at 07:55:00

## 2021-03-25 RX ADMIN — ROCURONIUM BROMIDE 5 MG: 10 INJECTION, SOLUTION INTRAVENOUS at 07:35:00

## 2021-03-25 RX ADMIN — ROCURONIUM BROMIDE 10 MG: 10 INJECTION, SOLUTION INTRAVENOUS at 08:01:00

## 2021-03-25 RX ADMIN — SODIUM CHLORIDE, SODIUM LACTATE, POTASSIUM CHLORIDE, CALCIUM CHLORIDE: 600; 310; 30; 20 INJECTION, SOLUTION INTRAVENOUS at 09:11:00

## 2021-03-25 RX ADMIN — MIDAZOLAM HYDROCHLORIDE 2 MG: 1 INJECTION INTRAMUSCULAR; INTRAVENOUS at 07:09:00

## 2021-03-25 RX ADMIN — SODIUM CHLORIDE, SODIUM LACTATE, POTASSIUM CHLORIDE, CALCIUM CHLORIDE: 600; 310; 30; 20 INJECTION, SOLUTION INTRAVENOUS at 07:30:00

## 2021-03-25 RX ADMIN — LIDOCAINE HYDROCHLORIDE 50 MG: 10 INJECTION, SOLUTION EPIDURAL; INFILTRATION; INTRACAUDAL; PERINEURAL at 07:35:00

## 2021-03-25 RX ADMIN — ONDANSETRON 4 MG: 2 INJECTION INTRAMUSCULAR; INTRAVENOUS at 07:55:00

## 2021-03-25 NOTE — ANESTHESIA PREPROCEDURE EVALUATION
Anesthesia PreOp Note    HPI:     Dylan Camarillo is a 60year old female who presents for preoperative consultation requested by: Jaye Schmid MD    Date of Surgery: 3/25/2021    Procedure(s):  xi robotic assisted cholecystectomy with Capsule Delayed Release, Take 1 capsule (20 mg total) by mouth daily. Before meal, Disp: 30 capsule, Rfl: 5, 3/19/2021  famoTIDine 20 MG Oral Tab, Take 1 tablet (20 mg total) by mouth 2 (two) times daily. , Disp: 60 tablet, Rfl: 3, 3/19/2021  Adalimumab (HU Transportation Needs:       Lack of Transportation (Medical):       Lack of Transportation (Non-Medical):   Physical Activity:       Days of Exercise per Week:       Minutes of Exercise per Session:   Stress:       Feeling of Stress :   Social Connection negative ROS and normal exam   Cardiovascular - normal exam  Exercise tolerance: good  (+) hypertension well controlled, CAD,     Neuro/Psych - negative ROS     GI/Hepatic/Renal    (+) GERD well controlled, liver disease,     Endo/Other    (+) diabetes earle

## 2021-03-25 NOTE — ANESTHESIA POSTPROCEDURE EVALUATION
Patient: Sree Mota    Procedure Summary     Date: 03/25/21 Room / Location: [facility] MAIN OR  / [facility] MAIN OR    Anesthesia Start: 0730 Anesthesia Stop:     Procedure: xi robotic assisted cholecystectomy with cholangiogram possible open chol

## 2021-03-25 NOTE — OPERATIVE REPORT
[facility] OPERATING ROOM OPERATIVE REPORT:     PATIENT NAME: Jeffrey Hernandez  : 1955   MRN: [id number]  SITE:  [facility]:   3/25/2021     PREOPERATIVE DIAGNOSIS: Chronic Cholecystitis, With gallbl catheter. The abdomen was prepped and draped in routine sterile fashion. Local anesthetic was anesthetized and injected into the umbilical region and all port sites. A small incision was made in the umbilicus.   A Veress needle was introduced into the abd Pneumoperitoneum was reestablished approxi-15 mmHg. Hemostasis of the liver bed was obtained using electrocautery. The right upper quadrant was irrigated and aspirated with copious amounts of saline solution. There was no active bleeding present. Belkis Rangel

## 2021-03-25 NOTE — ANESTHESIA PROCEDURE NOTES
Airway  Urgency: Elective    Airway not difficult    General Information and Staff    Patient location during procedure: OR  Anesthesiologist: Birgit Goldstein MD  Resident/CRNA: Stuart Garcia CRNA  Performed: anesthesiologist and CRNA     I

## 2021-03-25 NOTE — INTERVAL H&P NOTE
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   I dis

## 2021-03-25 NOTE — BRIEF OP NOTE
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

## 2021-04-03 ENCOUNTER — LAB ENCOUNTER (OUTPATIENT)
Dept: LAB | Facility: HOSPITAL | Age: 66
End: 2021-04-03
Attending: INTERNAL MEDICINE
Payer: COMMERCIAL

## 2021-04-03 DIAGNOSIS — M05.9 SEROPOSITIVE RHEUMATOID ARTHRITIS (HCC): ICD-10-CM

## 2021-04-03 DIAGNOSIS — G89.29 CHRONIC PAIN OF BOTH KNEES: ICD-10-CM

## 2021-04-03 DIAGNOSIS — M25.562 CHRONIC PAIN OF BOTH KNEES: ICD-10-CM

## 2021-04-03 DIAGNOSIS — M25.561 CHRONIC PAIN OF BOTH KNEES: ICD-10-CM

## 2021-04-03 DIAGNOSIS — Z51.81 THERAPEUTIC DRUG MONITORING: ICD-10-CM

## 2021-04-03 PROCEDURE — 82565 ASSAY OF CREATININE: CPT

## 2021-04-03 PROCEDURE — 84450 TRANSFERASE (AST) (SGOT): CPT

## 2021-04-03 PROCEDURE — 82040 ASSAY OF SERUM ALBUMIN: CPT

## 2021-04-03 PROCEDURE — 84460 ALANINE AMINO (ALT) (SGPT): CPT

## 2021-04-03 PROCEDURE — 36415 COLL VENOUS BLD VENIPUNCTURE: CPT

## 2021-04-03 PROCEDURE — 86140 C-REACTIVE PROTEIN: CPT

## 2021-04-03 PROCEDURE — 85652 RBC SED RATE AUTOMATED: CPT

## 2021-04-03 PROCEDURE — 85025 COMPLETE CBC W/AUTO DIFF WBC: CPT

## 2021-04-09 ENCOUNTER — OFFICE VISIT (OUTPATIENT)
Dept: RHEUMATOLOGY | Facility: CLINIC | Age: 66
End: 2021-04-09
Payer: COMMERCIAL

## 2021-04-09 ENCOUNTER — TELEPHONE (OUTPATIENT)
Dept: RHEUMATOLOGY | Facility: CLINIC | Age: 66
End: 2021-04-09

## 2021-04-09 VITALS
BODY MASS INDEX: 28.7 KG/M2 | HEIGHT: 63.5 IN | WEIGHT: 164 LBS | SYSTOLIC BLOOD PRESSURE: 144 MMHG | DIASTOLIC BLOOD PRESSURE: 84 MMHG | HEART RATE: 75 BPM

## 2021-04-09 DIAGNOSIS — M25.562 CHRONIC PAIN OF BOTH KNEES: Primary | ICD-10-CM

## 2021-04-09 DIAGNOSIS — M25.561 CHRONIC PAIN OF BOTH KNEES: Primary | ICD-10-CM

## 2021-04-09 DIAGNOSIS — G89.29 CHRONIC PAIN OF BOTH KNEES: Primary | ICD-10-CM

## 2021-04-09 DIAGNOSIS — Z51.81 THERAPEUTIC DRUG MONITORING: ICD-10-CM

## 2021-04-09 DIAGNOSIS — M05.9 SEROPOSITIVE RHEUMATOID ARTHRITIS (HCC): ICD-10-CM

## 2021-04-09 PROCEDURE — 3008F BODY MASS INDEX DOCD: CPT | Performed by: INTERNAL MEDICINE

## 2021-04-09 PROCEDURE — 99214 OFFICE O/P EST MOD 30 MIN: CPT | Performed by: INTERNAL MEDICINE

## 2021-04-09 PROCEDURE — 3077F SYST BP >= 140 MM HG: CPT | Performed by: INTERNAL MEDICINE

## 2021-04-09 PROCEDURE — 3079F DIAST BP 80-89 MM HG: CPT | Performed by: INTERNAL MEDICINE

## 2021-04-09 RX ORDER — ADALIMUMAB 40MG/0.4ML
40 KIT SUBCUTANEOUS
Qty: 4 EACH | Refills: 2 | Status: SHIPPED | OUTPATIENT
Start: 2021-04-09 | End: 2021-04-10

## 2021-04-09 RX ORDER — PREDNISONE 10 MG/1
TABLET ORAL
Qty: 20 TABLET | Refills: 0 | Status: SHIPPED | OUTPATIENT
Start: 2021-04-09 | End: 2021-12-01

## 2021-04-09 NOTE — PATIENT INSTRUCTIONS
You are seen today for rheumatoid arthritis  Since you are having some ankle pain and slight swelling we will start prednisone  Take 30 mg daily x3 days then 20 mg daily x3 days then 10 mg daily x3 days then stop  For the knees, we have already done roberto carlos

## 2021-04-09 NOTE — PROGRESS NOTES
Breanna Glass is a 60year old female. HPI:   Patient presents with:   Follow - Up  Knee Pain: B/Lknee pain, left knee the worse  Medication Follow-Up      I had the pleasure of seeing Breanna Glass on 4/9/2021 for follow Reviewed   Family Hx Reviewed     Medications (Active prior to today's visit):  Current Outpatient Medications   Medication Sig Dispense Refill   • traMADol HCl 50 MG Oral Tab Take 1 tablet (50 mg total) by mouth every 6 (six) hours as needed for Pain.  8 t FROM  Shoulders: b/l shoulders FROM   Hip: normal log roll, no lateral hip pain, MAI test negative b/l  Knees: b/l knee no swelling, +TTP, now able to ambulate with no walker +crepitations  Ankles: mild swelling b/l ankles  Feet: no pain with MTP squeeze

## 2021-04-10 RX ORDER — ADALIMUMAB 40MG/0.4ML
40 KIT SUBCUTANEOUS
Qty: 12 EACH | Refills: 3 | Status: SHIPPED | OUTPATIENT
Start: 2021-04-10

## 2021-04-10 NOTE — TELEPHONE ENCOUNTER
Patient has [employer] which is [facility]. Pt has Humira through [employer]. New script sent to [facility] for increased dosage.

## 2021-04-13 ENCOUNTER — TELEPHONE (OUTPATIENT)
Dept: RHEUMATOLOGY | Facility: CLINIC | Age: 66
End: 2021-04-13

## 2021-04-13 NOTE — TELEPHONE ENCOUNTER
Message: The patients plan does not cover the prescribed drug without a prior authorization. Please contact the plan at [phone] to initiate a prior authorization or call/fax the pharmacy to change medication.  The patient's Recipient ID is [id number]

## 2021-04-14 NOTE — TELEPHONE ENCOUNTER
Pt is [employer] pt , T [phone]     Left message for PA [facility] .      Per Elicia Mullen at [employer]

## 2021-07-20 RX ORDER — PREDNISONE 1 MG/1
TABLET ORAL
Qty: 90 TABLET | Refills: 0 | OUTPATIENT
Start: 2021-07-20

## 2021-07-23 ENCOUNTER — TELEPHONE (OUTPATIENT)
Dept: RHEUMATOLOGY | Facility: CLINIC | Age: 66
End: 2021-07-23

## 2021-07-23 NOTE — TELEPHONE ENCOUNTER
Received a fax from [employer]   PA needed for Methotrexate 2.5mg tablet  Completed Covermymed form  Called [facility] at [phone]  Patient has no coverage except for prescription card discount.

## 2021-07-23 NOTE — TELEPHONE ENCOUNTER
Spoke with patient using language line. She indicated that she uses the [employer] discount card. Will notify pharmacy. Pharmacy stated they have a person they contact to do authorization for her. No further action.

## 2021-07-28 RX ORDER — PREDNISONE 5 MG/1
TABLET ORAL
Qty: 90 TABLET | Refills: 0 | OUTPATIENT
Start: 2021-07-28

## 2021-08-06 ENCOUNTER — LAB ENCOUNTER (OUTPATIENT)
Dept: LAB | Facility: HOSPITAL | Age: 66
End: 2021-08-06
Attending: INTERNAL MEDICINE
Payer: COMMERCIAL

## 2021-08-06 DIAGNOSIS — M25.561 CHRONIC PAIN OF BOTH KNEES: ICD-10-CM

## 2021-08-06 DIAGNOSIS — G89.29 CHRONIC PAIN OF BOTH KNEES: ICD-10-CM

## 2021-08-06 DIAGNOSIS — M05.9 SEROPOSITIVE RHEUMATOID ARTHRITIS (HCC): ICD-10-CM

## 2021-08-06 DIAGNOSIS — Z51.81 THERAPEUTIC DRUG MONITORING: ICD-10-CM

## 2021-08-06 DIAGNOSIS — M25.562 CHRONIC PAIN OF BOTH KNEES: ICD-10-CM

## 2021-08-06 LAB
ALBUMIN SERPL-MCNC: 3.5 G/DL (ref 3.4–5)
ALT SERPL-CCNC: 26 U/L
AST SERPL-CCNC: 13 U/L (ref 15–37)
BASOPHILS # BLD AUTO: 0.01 X10(3) UL (ref 0–0.2)
BASOPHILS NFR BLD AUTO: 0.2 %
CREAT BLD-MCNC: 1.05 MG/DL
CRP SERPL-MCNC: <0.29 MG/DL (ref ?–0.3)
DEPRECATED RDW RBC AUTO: 54.7 FL (ref 35.1–46.3)
EOSINOPHIL # BLD AUTO: 0.06 X10(3) UL (ref 0–0.7)
EOSINOPHIL NFR BLD AUTO: 0.9 %
ERYTHROCYTE [DISTWIDTH] IN BLOOD BY AUTOMATED COUNT: 15 % (ref 11–15)
ERYTHROCYTE [SEDIMENTATION RATE] IN BLOOD: 15 MM/HR
HCT VFR BLD AUTO: 34.9 %
HGB BLD-MCNC: 10.7 G/DL
IMM GRANULOCYTES # BLD AUTO: 0.02 X10(3) UL (ref 0–1)
IMM GRANULOCYTES NFR BLD: 0.3 %
LYMPHOCYTES # BLD AUTO: 1.84 X10(3) UL (ref 1–4)
LYMPHOCYTES NFR BLD AUTO: 28.2 %
MCH RBC QN AUTO: 31.3 PG (ref 26–34)
MCHC RBC AUTO-ENTMCNC: 30.7 G/DL (ref 31–37)
MCV RBC AUTO: 102 FL
MONOCYTES # BLD AUTO: 0.69 X10(3) UL (ref 0.1–1)
MONOCYTES NFR BLD AUTO: 10.6 %
NEUTROPHILS # BLD AUTO: 3.9 X10 (3) UL (ref 1.5–7.7)
NEUTROPHILS # BLD AUTO: 3.9 X10(3) UL (ref 1.5–7.7)
NEUTROPHILS NFR BLD AUTO: 59.8 %
PLATELET # BLD AUTO: 182 10(3)UL (ref 150–450)
RBC # BLD AUTO: 3.42 X10(6)UL
WBC # BLD AUTO: 6.5 X10(3) UL (ref 4–11)

## 2021-08-06 PROCEDURE — 85652 RBC SED RATE AUTOMATED: CPT

## 2021-08-06 PROCEDURE — 86140 C-REACTIVE PROTEIN: CPT

## 2021-08-06 PROCEDURE — 82565 ASSAY OF CREATININE: CPT

## 2021-08-06 PROCEDURE — 85025 COMPLETE CBC W/AUTO DIFF WBC: CPT

## 2021-08-06 PROCEDURE — 84450 TRANSFERASE (AST) (SGOT): CPT

## 2021-08-06 PROCEDURE — 36415 COLL VENOUS BLD VENIPUNCTURE: CPT

## 2021-08-06 PROCEDURE — 84460 ALANINE AMINO (ALT) (SGPT): CPT

## 2021-08-06 PROCEDURE — 82040 ASSAY OF SERUM ALBUMIN: CPT

## 2021-08-13 ENCOUNTER — OFFICE VISIT (OUTPATIENT)
Dept: RHEUMATOLOGY | Facility: CLINIC | Age: 66
End: 2021-08-13
Payer: COMMERCIAL

## 2021-08-13 ENCOUNTER — TELEPHONE (OUTPATIENT)
Dept: RHEUMATOLOGY | Facility: CLINIC | Age: 66
End: 2021-08-13

## 2021-08-13 VITALS
BODY MASS INDEX: 28.7 KG/M2 | HEART RATE: 73 BPM | SYSTOLIC BLOOD PRESSURE: 142 MMHG | WEIGHT: 164 LBS | HEIGHT: 63.5 IN | DIASTOLIC BLOOD PRESSURE: 85 MMHG

## 2021-08-13 DIAGNOSIS — G89.29 CHRONIC PAIN OF BOTH KNEES: ICD-10-CM

## 2021-08-13 DIAGNOSIS — M05.9 SEROPOSITIVE RHEUMATOID ARTHRITIS (HCC): ICD-10-CM

## 2021-08-13 DIAGNOSIS — M25.562 CHRONIC PAIN OF BOTH KNEES: ICD-10-CM

## 2021-08-13 DIAGNOSIS — M25.561 CHRONIC PAIN OF BOTH KNEES: ICD-10-CM

## 2021-08-13 DIAGNOSIS — Z51.81 THERAPEUTIC DRUG MONITORING: ICD-10-CM

## 2021-08-13 DIAGNOSIS — R76.12 POSITIVE QUANTIFERON-TB GOLD TEST: ICD-10-CM

## 2021-08-13 PROCEDURE — 3077F SYST BP >= 140 MM HG: CPT | Performed by: INTERNAL MEDICINE

## 2021-08-13 PROCEDURE — 99214 OFFICE O/P EST MOD 30 MIN: CPT | Performed by: INTERNAL MEDICINE

## 2021-08-13 PROCEDURE — 3079F DIAST BP 80-89 MM HG: CPT | Performed by: INTERNAL MEDICINE

## 2021-08-13 PROCEDURE — 3008F BODY MASS INDEX DOCD: CPT | Performed by: INTERNAL MEDICINE

## 2021-08-13 RX ORDER — FOLIC ACID 1 MG/1
1 TABLET ORAL DAILY
Qty: 90 TABLET | Refills: 1 | Status: SHIPPED | OUTPATIENT
Start: 2021-08-13

## 2021-08-13 NOTE — PROGRESS NOTES
Julien Hong is a 60year old female. HPI:   Patient presents with: Follow - Up  Knee Pain    I had the pleasure of seeing Julien Hong on 8/13/2021 for follow up Seropositve RA (+RF and CCP) and B/L knee pain.      Cur Reviewed   Family Hx Reviewed     Medications (Active prior to today's visit):  Current Outpatient Medications   Medication Sig Dispense Refill   • Adalimumab (HUMIRA PEN) 40 MG/0.4ML Subcutaneous Pen-injector Kit Inject 40 mg into the skin every 7 days.  1 negative b/l  Knees: b/l knee no swelling, +TTP, now able to ambulate with no walker +crepitations  Ankles: no swelling  Feet: no pain with MTP squeeze, no toe swelling or pain or warmth on palpation with FROM  Spine: no lumbar or sacral pain on palpation.

## 2021-08-13 NOTE — PATIENT INSTRUCTIONS
You were seen today for rheumatoid arthritis  Joints are better controlled on methotrexate and Humira  Continue methotrexate 6 pills weekly and folic acid daily  Continue Humira every 2 weeks  For your knee pain is from osteoarthritis.   We already did romy

## 2021-08-13 NOTE — TELEPHONE ENCOUNTER
Received a fax for methotrexate PA 2.5mg   Called number from previous PA TE. Provided number below. Attempted to call, It said Maryana Yoon is busy please call again later.     PA phone # [phone] Ex [phone]    Received a fax for methotrexate PA 2.5mg

## 2021-08-17 NOTE — TELEPHONE ENCOUNTER
Called the number listed below and left message to help patient get approval for methotrexate. Pharmacy and patient number provided.

## 2021-08-31 ENCOUNTER — TELEPHONE (OUTPATIENT)
Dept: RHEUMATOLOGY | Facility: CLINIC | Age: 66
End: 2021-08-31

## 2021-08-31 ENCOUNTER — TELEPHONE (OUTPATIENT)
Dept: INTERNAL MEDICINE CLINIC | Facility: CLINIC | Age: 66
End: 2021-08-31

## 2021-08-31 DIAGNOSIS — G89.29 CHRONIC PAIN OF BOTH KNEES: Primary | ICD-10-CM

## 2021-08-31 DIAGNOSIS — M25.562 CHRONIC PAIN OF BOTH KNEES: Primary | ICD-10-CM

## 2021-08-31 DIAGNOSIS — M25.561 CHRONIC PAIN OF BOTH KNEES: Primary | ICD-10-CM

## 2021-08-31 NOTE — TELEPHONE ENCOUNTER
Pt dropped off application for humira that has to be signed by Dr Tata Gama.  Please fax to [phone]  Place in dr Sarah Chu box

## 2021-09-27 ENCOUNTER — TELEPHONE (OUTPATIENT)
Dept: RHEUMATOLOGY | Facility: CLINIC | Age: 66
End: 2021-09-27

## 2021-09-27 RX ORDER — PREDNISONE 10 MG/1
TABLET ORAL
Qty: 20 TABLET | Refills: 0 | Status: CANCELLED | OUTPATIENT
Start: 2021-09-27

## 2021-10-02 ENCOUNTER — HOSPITAL ENCOUNTER (OUTPATIENT)
Dept: GENERAL RADIOLOGY | Age: 66
Discharge: HOME | End: 2021-10-02
Attending: FAMILY MEDICINE
Payer: COMMERCIAL

## 2021-10-02 DIAGNOSIS — G89.29 CHRONIC PAIN OF LEFT KNEE: ICD-10-CM

## 2021-10-02 DIAGNOSIS — G89.29 CHRONIC PAIN OF RIGHT KNEE: ICD-10-CM

## 2021-10-02 DIAGNOSIS — M25.561 CHRONIC PAIN OF RIGHT KNEE: ICD-10-CM

## 2021-10-02 DIAGNOSIS — M25.562 CHRONIC PAIN OF LEFT KNEE: ICD-10-CM

## 2021-10-02 PROCEDURE — 73560 X-RAY EXAM OF KNEE 1 OR 2: CPT | Performed by: FAMILY MEDICINE

## 2021-11-19 ENCOUNTER — TELEPHONE (OUTPATIENT)
Dept: RHEUMATOLOGY | Facility: CLINIC | Age: 66
End: 2021-11-19

## 2021-11-19 ENCOUNTER — OFFICE VISIT (OUTPATIENT)
Dept: RHEUMATOLOGY | Facility: CLINIC | Age: 66
End: 2021-11-19
Payer: COMMERCIAL

## 2021-11-19 ENCOUNTER — LAB ENCOUNTER (OUTPATIENT)
Dept: LAB | Facility: HOSPITAL | Age: 66
End: 2021-11-19
Attending: INTERNAL MEDICINE
Payer: COMMERCIAL

## 2021-11-19 ENCOUNTER — TELEPHONE (OUTPATIENT)
Dept: ORTHOPEDICS CLINIC | Facility: CLINIC | Age: 66
End: 2021-11-19

## 2021-11-19 VITALS
SYSTOLIC BLOOD PRESSURE: 118 MMHG | WEIGHT: 160 LBS | BODY MASS INDEX: 28 KG/M2 | HEART RATE: 79 BPM | DIASTOLIC BLOOD PRESSURE: 77 MMHG | HEIGHT: 63.5 IN

## 2021-11-19 DIAGNOSIS — G89.29 CHRONIC PAIN OF BOTH KNEES: ICD-10-CM

## 2021-11-19 DIAGNOSIS — M25.561 CHRONIC PAIN OF BOTH KNEES: ICD-10-CM

## 2021-11-19 DIAGNOSIS — Z51.81 THERAPEUTIC DRUG MONITORING: ICD-10-CM

## 2021-11-19 DIAGNOSIS — R76.12 POSITIVE QUANTIFERON-TB GOLD TEST: ICD-10-CM

## 2021-11-19 DIAGNOSIS — M25.562 CHRONIC PAIN OF BOTH KNEES: ICD-10-CM

## 2021-11-19 DIAGNOSIS — M05.9 SEROPOSITIVE RHEUMATOID ARTHRITIS (HCC): ICD-10-CM

## 2021-11-19 PROCEDURE — 36415 COLL VENOUS BLD VENIPUNCTURE: CPT

## 2021-11-19 PROCEDURE — 3008F BODY MASS INDEX DOCD: CPT | Performed by: INTERNAL MEDICINE

## 2021-11-19 PROCEDURE — 85652 RBC SED RATE AUTOMATED: CPT

## 2021-11-19 PROCEDURE — 3078F DIAST BP <80 MM HG: CPT | Performed by: INTERNAL MEDICINE

## 2021-11-19 PROCEDURE — 84450 TRANSFERASE (AST) (SGOT): CPT

## 2021-11-19 PROCEDURE — 86140 C-REACTIVE PROTEIN: CPT

## 2021-11-19 PROCEDURE — 3074F SYST BP LT 130 MM HG: CPT | Performed by: INTERNAL MEDICINE

## 2021-11-19 PROCEDURE — 82040 ASSAY OF SERUM ALBUMIN: CPT

## 2021-11-19 PROCEDURE — 99214 OFFICE O/P EST MOD 30 MIN: CPT | Performed by: INTERNAL MEDICINE

## 2021-11-19 PROCEDURE — 85025 COMPLETE CBC W/AUTO DIFF WBC: CPT

## 2021-11-19 PROCEDURE — 84460 ALANINE AMINO (ALT) (SGPT): CPT

## 2021-11-19 PROCEDURE — 82565 ASSAY OF CREATININE: CPT

## 2021-11-19 NOTE — PROGRESS NOTES
Scottie Gray is a 73year old female. HPI:   Patient presents with: Follow - Up    I had the pleasure of seeing Scottie Gray on 11/19/2021 for follow up Seropositve RA (+RF and CCP) and B/L knee pain.      Current Medic moderate narrowing of medial and lateral cmpts  Still has not seen orthopedic, have given her 2 referrals already  Has some pain and swelling in ankles otherwise no pain in hands, elbows or shoulders  Reports some lower back pain mostly on the R side no ra PHYSICAL EXAM:   GEN: AAOx3, NAD  HEENT: EOMI, PERRLA, no injection or icterus, oral mucosa moist, no oral lesions. No lymphadenopathy. No facial rash  CVS: RRR, no murmurs rubs or gallops. Equal 2+ distal pulses.    LUNGS: CTAB, no increased work of br referral for orthopedic again    +Quant TB  - treated in Jan 2018     Pt will f/u in 3-4 mos    Ge Arora MD  11/19/2021  8:45 AM

## 2021-11-19 NOTE — PATIENT INSTRUCTIONS
You were seen today for rheumatoid arthritis   Continue methotrexate 6 pills weekly and folic acid daily  Blood work today  Plan to get Humira approved again  See orthopedic  Follow up in Feb or March

## 2021-11-19 NOTE — TELEPHONE ENCOUNTER
Patient has [employer] and has referral for ortho doctors. Please call with  at [phone] to confirm that any of our providers will accept insurance, thanks.

## 2021-11-19 NOTE — TELEPHONE ENCOUNTER
Access [employer] patient on Humira. She was getting this medication up until a month ago.   Can we look into this and see why she has not been able to get the Humira over the past month

## 2021-11-19 NOTE — TELEPHONE ENCOUNTER
Patient was approved for [facility] through patient assistance until 7/16/2021    New patient assistance provider application filled out.      Called patient using lang line ID [id number] and informed she needs to come and fill out her portion of patient assistance

## 2021-11-22 ENCOUNTER — TELEPHONE (OUTPATIENT)
Dept: RHEUMATOLOGY | Facility: CLINIC | Age: 66
End: 2021-11-22

## 2021-11-22 NOTE — TELEPHONE ENCOUNTER
Spoke to patient using [employer] Dann [id number] Alfredo: [id number] and informed her of Dr. Honeycutt Forth note. She verbalized understanding. No further action needed.

## 2021-11-22 NOTE — TELEPHONE ENCOUNTER
If you can call patient let her know that I reviewed her blood work.   Her CBC, liver and kidney tests were normal.  Inflammation markers, ESR and CRP were normal  She is Syriac-speaking

## 2021-11-29 RX ORDER — PREDNISONE 10 MG/1
TABLET ORAL
Qty: 20 TABLET | Refills: 0 | Status: CANCELLED | OUTPATIENT
Start: 2021-11-29

## 2021-11-29 NOTE — TELEPHONE ENCOUNTER
Last filled: 4/9/21 #20 tab with 0 refills  LOV: 11/19/21  Future Appointments   Date Time Provider Yamileth Hoang   2/18/2022  8:40 AM Sherryle Donning, MD [address] [facility], located within [facility]

## 2021-11-30 NOTE — TELEPHONE ENCOUNTER
Using the language line patient reports pain in her knees 4/5 on the pain scale. She has swelling in her feet. She would like prednisone. Please advise.

## 2021-12-01 RX ORDER — PREDNISONE 10 MG/1
TABLET ORAL
Qty: 20 TABLET | Refills: 0 | Status: SHIPPED | OUTPATIENT
Start: 2021-12-01

## 2021-12-01 NOTE — TELEPHONE ENCOUNTER
Called patient. Informed provided sent medication to pharmacy on file. Patient verbalized understanding.

## 2021-12-02 NOTE — TELEPHONE ENCOUNTER
Patient calling for update on scheduling, please call at [phone][facility]

## 2022-02-17 ENCOUNTER — OFFICE VISIT (OUTPATIENT)
Dept: ORTHOPEDICS CLINIC | Facility: CLINIC | Age: 67
End: 2022-02-17
Payer: MEDICAID

## 2022-02-17 ENCOUNTER — HOSPITAL ENCOUNTER (OUTPATIENT)
Dept: GENERAL RADIOLOGY | Facility: HOSPITAL | Age: 67
Discharge: HOME | End: 2022-02-17
Attending: ORTHOPAEDIC SURGERY
Payer: MEDICAID

## 2022-02-17 VITALS — BODY MASS INDEX: 27.83 KG/M2 | HEIGHT: 63.78 IN | WEIGHT: 161 LBS

## 2022-02-17 DIAGNOSIS — M06.9 RHEUMATOID ARTHRITIS INVOLVING BOTH KNEES, UNSPECIFIED WHETHER RHEUMATOID FACTOR PRESENT (HCC): Primary | ICD-10-CM

## 2022-02-17 DIAGNOSIS — M25.561 PAIN IN BOTH KNEES, UNSPECIFIED CHRONICITY: ICD-10-CM

## 2022-02-17 DIAGNOSIS — M25.562 PAIN IN BOTH KNEES, UNSPECIFIED CHRONICITY: ICD-10-CM

## 2022-02-17 PROCEDURE — 73560 X-RAY EXAM OF KNEE 1 OR 2: CPT | Performed by: ORTHOPAEDIC SURGERY

## 2022-02-17 PROCEDURE — 99244 OFF/OP CNSLTJ NEW/EST MOD 40: CPT | Performed by: ORTHOPAEDIC SURGERY

## 2022-02-17 PROCEDURE — 3008F BODY MASS INDEX DOCD: CPT | Performed by: ORTHOPAEDIC SURGERY

## 2022-02-18 ENCOUNTER — LAB ENCOUNTER (OUTPATIENT)
Dept: LAB | Facility: HOSPITAL | Age: 67
End: 2022-02-18
Attending: INTERNAL MEDICINE
Payer: MEDICAID

## 2022-02-18 ENCOUNTER — OFFICE VISIT (OUTPATIENT)
Dept: RHEUMATOLOGY | Facility: CLINIC | Age: 67
End: 2022-02-18
Payer: MEDICAID

## 2022-02-18 VITALS
SYSTOLIC BLOOD PRESSURE: 148 MMHG | BODY MASS INDEX: 27 KG/M2 | HEART RATE: 67 BPM | WEIGHT: 159 LBS | DIASTOLIC BLOOD PRESSURE: 91 MMHG

## 2022-02-18 DIAGNOSIS — M25.562 CHRONIC PAIN OF BOTH KNEES: ICD-10-CM

## 2022-02-18 DIAGNOSIS — R76.12 POSITIVE QUANTIFERON-TB GOLD TEST: ICD-10-CM

## 2022-02-18 DIAGNOSIS — M05.9 SEROPOSITIVE RHEUMATOID ARTHRITIS (HCC): ICD-10-CM

## 2022-02-18 DIAGNOSIS — M05.9 SEROPOSITIVE RHEUMATOID ARTHRITIS (HCC): Primary | ICD-10-CM

## 2022-02-18 DIAGNOSIS — Z51.81 THERAPEUTIC DRUG MONITORING: ICD-10-CM

## 2022-02-18 DIAGNOSIS — G89.29 CHRONIC PAIN OF BOTH KNEES: ICD-10-CM

## 2022-02-18 DIAGNOSIS — M25.561 CHRONIC PAIN OF BOTH KNEES: ICD-10-CM

## 2022-02-18 LAB
ALBUMIN SERPL-MCNC: 3.6 G/DL (ref 3.4–5)
ALT SERPL-CCNC: 22 U/L
AST SERPL-CCNC: 17 U/L (ref 15–37)
BASOPHILS # BLD AUTO: 0.01 X10(3) UL (ref 0–0.2)
CREAT BLD-MCNC: 0.94 MG/DL
CRP SERPL-MCNC: <0.29 MG/DL (ref ?–0.3)
DEPRECATED RDW RBC AUTO: 49.3 FL (ref 35.1–46.3)
EOSINOPHIL # BLD AUTO: 0.04 X10(3) UL (ref 0–0.7)
EOSINOPHIL NFR BLD AUTO: 0.7 %
ERYTHROCYTE [DISTWIDTH] IN BLOOD BY AUTOMATED COUNT: 13.5 % (ref 11–15)
ERYTHROCYTE [SEDIMENTATION RATE] IN BLOOD: 24 MM/HR
HCT VFR BLD AUTO: 36.5 %
HGB BLD-MCNC: 11.8 G/DL
IMM GRANULOCYTES # BLD AUTO: 0.01 X10(3) UL (ref 0–1)
IMM GRANULOCYTES NFR BLD: 0.2 %
LYMPHOCYTES # BLD AUTO: 2.04 X10(3) UL (ref 1–4)
LYMPHOCYTES NFR BLD AUTO: 36.5 %
MCH RBC QN AUTO: 32.3 PG (ref 26–34)
MCHC RBC AUTO-ENTMCNC: 32.3 G/DL (ref 31–37)
MCV RBC AUTO: 100 FL
MONOCYTES # BLD AUTO: 0.56 X10(3) UL (ref 0.1–1)
MONOCYTES NFR BLD AUTO: 10 %
NEUTROPHILS # BLD AUTO: 2.93 X10 (3) UL (ref 1.5–7.7)
NEUTROPHILS # BLD AUTO: 2.93 X10(3) UL (ref 1.5–7.7)
NEUTROPHILS NFR BLD AUTO: 52.4 %
PLATELET # BLD AUTO: 228 10(3)UL (ref 150–450)
WBC # BLD AUTO: 5.6 X10(3) UL (ref 4–11)

## 2022-02-18 PROCEDURE — 82565 ASSAY OF CREATININE: CPT

## 2022-02-18 PROCEDURE — 84460 ALANINE AMINO (ALT) (SGPT): CPT

## 2022-02-18 PROCEDURE — 99214 OFFICE O/P EST MOD 30 MIN: CPT | Performed by: INTERNAL MEDICINE

## 2022-02-18 PROCEDURE — 82040 ASSAY OF SERUM ALBUMIN: CPT

## 2022-02-18 PROCEDURE — 85652 RBC SED RATE AUTOMATED: CPT

## 2022-02-18 PROCEDURE — 85025 COMPLETE CBC W/AUTO DIFF WBC: CPT

## 2022-02-18 PROCEDURE — 84450 TRANSFERASE (AST) (SGOT): CPT

## 2022-02-18 PROCEDURE — 36415 COLL VENOUS BLD VENIPUNCTURE: CPT

## 2022-02-18 PROCEDURE — 86140 C-REACTIVE PROTEIN: CPT

## 2022-02-18 PROCEDURE — 3077F SYST BP >= 140 MM HG: CPT | Performed by: INTERNAL MEDICINE

## 2022-02-18 PROCEDURE — 3080F DIAST BP >= 90 MM HG: CPT | Performed by: INTERNAL MEDICINE

## 2022-02-18 RX ORDER — FOLIC ACID 1 MG/1
1 TABLET ORAL DAILY
Qty: 90 TABLET | Refills: 1 | Status: SHIPPED | OUTPATIENT
Start: 2022-02-18

## 2022-02-18 NOTE — PATIENT INSTRUCTIONS
You were seen today for rheumatoid arthritis   Continue methotrexate 6 pills weekly  Folic acid daily  Continue humira every 2 weeks  Blood work today   Follow up in 4 mos

## 2022-03-02 ENCOUNTER — HOSPITAL ENCOUNTER (OUTPATIENT)
Dept: MRI IMAGING | Age: 67
Discharge: HOME | End: 2022-03-02
Attending: ORTHOPAEDIC SURGERY
Payer: MEDICAID

## 2022-03-02 DIAGNOSIS — M06.9 RHEUMATOID ARTHRITIS INVOLVING BOTH KNEES, UNSPECIFIED WHETHER RHEUMATOID FACTOR PRESENT (HCC): ICD-10-CM

## 2022-03-02 PROCEDURE — 73721 MRI JNT OF LWR EXTRE W/O DYE: CPT | Performed by: ORTHOPAEDIC SURGERY

## 2022-03-15 ENCOUNTER — TELEPHONE (OUTPATIENT)
Dept: ORTHOPEDICS CLINIC | Facility: CLINIC | Age: 67
End: 2022-03-15

## 2022-03-15 NOTE — TELEPHONE ENCOUNTER
Dr. Richard Gamino called and states he is pt PCP and pt is there now for pre op clearance but pt doesn't have sx date scheduled yet. He would like a copy of MRI report faxed to [phone]. He would like his office CB once pt has sx date so he can put in all the pre op orders and testing can be done at [facility]. His office phone # [phone]. Informed him I would let our sx  know and they will f/u . MRI report faxed as requested.  Tasked to David Akers

## 2022-03-21 ENCOUNTER — TELEPHONE (OUTPATIENT)
Dept: ORTHOPEDICS CLINIC | Facility: CLINIC | Age: 67
End: 2022-03-21

## 2022-03-21 NOTE — TELEPHONE ENCOUNTER
Type of surgery:  Right Total Knee Arthroplasty   Date: 5/23/22   Location: [facility]  Medical Clearance:      *Medical: Yes      *Dental: Yes      *Other:  Prior Authorization Status: Pending  Workers Comp:  Medacta/Sylvia:  [place]: Yes  POV: 6/8/22

## 2022-03-21 NOTE — TELEPHONE ENCOUNTER
Spoke with patient using language line Devon Elam [id number]). Explained to she needs to have her medical and dental clearance within 30 days of her surgery date. Patient understood, and had no further questions.

## 2022-05-03 ENCOUNTER — HOSPITAL ENCOUNTER (OUTPATIENT)
Dept: GENERAL RADIOLOGY | Age: 67
Discharge: HOME | End: 2022-05-03
Attending: FAMILY MEDICINE
Payer: MEDICAID

## 2022-05-03 ENCOUNTER — LAB ENCOUNTER (OUTPATIENT)
Dept: LAB | Age: 67
End: 2022-05-03
Attending: FAMILY MEDICINE
Payer: MEDICAID

## 2022-05-03 ENCOUNTER — EKG ENCOUNTER (OUTPATIENT)
Dept: LAB | Age: 67
End: 2022-05-03
Attending: FAMILY MEDICINE
Payer: MEDICAID

## 2022-05-03 DIAGNOSIS — Z01.818 PREOP EXAMINATION: ICD-10-CM

## 2022-05-03 DIAGNOSIS — Z00.00 ROUTINE GENERAL MEDICAL EXAMINATION AT A HEALTH CARE FACILITY: ICD-10-CM

## 2022-05-03 DIAGNOSIS — Z01.818 PREOPERATIVE EXAMINATION: Primary | ICD-10-CM

## 2022-05-03 LAB
ALBUMIN SERPL-MCNC: 3.6 G/DL (ref 3.4–5)
ALBUMIN/GLOB SERPL: 0.9 {RATIO} (ref 1–2)
ALP LIVER SERPL-CCNC: 94 U/L
ALT SERPL-CCNC: 24 U/L
ANION GAP SERPL CALC-SCNC: 5 MMOL/L (ref 0–18)
APTT PPP: 29.4 SECONDS (ref 23.3–35.6)
AST SERPL-CCNC: 14 U/L (ref 15–37)
BASOPHILS # BLD AUTO: 0.02 X10(3) UL (ref 0–0.2)
BASOPHILS NFR BLD AUTO: 0.4 %
BILIRUB SERPL-MCNC: 0.4 MG/DL (ref 0.1–2)
BILIRUB UR QL: NEGATIVE
BUN BLD-MCNC: 27 MG/DL (ref 7–18)
BUN/CREAT SERPL: 28.1 (ref 10–20)
CALCIUM BLD-MCNC: 8.9 MG/DL (ref 8.5–10.1)
CHLORIDE SERPL-SCNC: 111 MMOL/L (ref 98–112)
CLARITY UR: CLEAR
CO2 SERPL-SCNC: 28 MMOL/L (ref 21–32)
COLOR UR: YELLOW
CREAT BLD-MCNC: 0.96 MG/DL
DEPRECATED RDW RBC AUTO: 49.5 FL (ref 35.1–46.3)
EOSINOPHIL # BLD AUTO: 0.05 X10(3) UL (ref 0–0.7)
EOSINOPHIL NFR BLD AUTO: 0.9 %
ERYTHROCYTE [DISTWIDTH] IN BLOOD BY AUTOMATED COUNT: 13.5 % (ref 11–15)
FASTING STATUS PATIENT QL REPORTED: NO
GLOBULIN PLAS-MCNC: 4 G/DL (ref 2.8–4.4)
GLUCOSE BLD-MCNC: 102 MG/DL (ref 70–99)
GLUCOSE UR-MCNC: NEGATIVE MG/DL
HCT VFR BLD AUTO: 34.1 %
HGB BLD-MCNC: 10.8 G/DL
HGB UR QL STRIP.AUTO: NEGATIVE
IMM GRANULOCYTES # BLD AUTO: 0.01 X10(3) UL (ref 0–1)
IMM GRANULOCYTES NFR BLD: 0.2 %
INR BLD: 0.96 (ref 0.8–1.2)
KETONES UR-MCNC: NEGATIVE MG/DL
LEUKOCYTE ESTERASE UR QL STRIP.AUTO: NEGATIVE
LYMPHOCYTES # BLD AUTO: 2.21 X10(3) UL (ref 1–4)
LYMPHOCYTES NFR BLD AUTO: 39.1 %
MCH RBC QN AUTO: 31.8 PG (ref 26–34)
MCHC RBC AUTO-ENTMCNC: 31.7 G/DL (ref 31–37)
MCV RBC AUTO: 100.3 FL
MONOCYTES # BLD AUTO: 0.51 X10(3) UL (ref 0.1–1)
MONOCYTES NFR BLD AUTO: 9 %
NEUTROPHILS # BLD AUTO: 2.85 X10 (3) UL (ref 1.5–7.7)
NEUTROPHILS # BLD AUTO: 2.85 X10(3) UL (ref 1.5–7.7)
NEUTROPHILS NFR BLD AUTO: 50.4 %
NITRITE UR QL STRIP.AUTO: NEGATIVE
OSMOLALITY SERPL CALC.SUM OF ELEC: 303 MOSM/KG (ref 275–295)
PH UR: 6 [PH] (ref 5–8)
PLATELET # BLD AUTO: 187 10(3)UL (ref 150–450)
POTASSIUM SERPL-SCNC: 3.9 MMOL/L (ref 3.5–5.1)
PROT SERPL-MCNC: 7.6 G/DL (ref 6.4–8.2)
PROT UR-MCNC: NEGATIVE MG/DL
PROTHROMBIN TIME: 12.9 SECONDS (ref 11.6–14.8)
RBC # BLD AUTO: 3.4 X10(6)UL
SODIUM SERPL-SCNC: 144 MMOL/L (ref 136–145)
SP GR UR STRIP: 1.02 (ref 1–1.03)
UROBILINOGEN UR STRIP-ACNC: <2
VIT C UR-MCNC: NEGATIVE MG/DL
WBC # BLD AUTO: 5.7 X10(3) UL (ref 4–11)

## 2022-05-03 PROCEDURE — 71046 X-RAY EXAM CHEST 2 VIEWS: CPT | Performed by: FAMILY MEDICINE

## 2022-05-03 PROCEDURE — 36415 COLL VENOUS BLD VENIPUNCTURE: CPT

## 2022-05-03 PROCEDURE — 93005 ELECTROCARDIOGRAM TRACING: CPT

## 2022-05-03 PROCEDURE — 93010 ELECTROCARDIOGRAM REPORT: CPT | Performed by: FAMILY MEDICINE

## 2022-05-03 PROCEDURE — 80053 COMPREHEN METABOLIC PANEL: CPT

## 2022-05-03 PROCEDURE — 81003 URINALYSIS AUTO W/O SCOPE: CPT

## 2022-05-03 PROCEDURE — 85025 COMPLETE CBC W/AUTO DIFF WBC: CPT

## 2022-05-03 PROCEDURE — 85730 THROMBOPLASTIN TIME PARTIAL: CPT

## 2022-05-03 PROCEDURE — 85610 PROTHROMBIN TIME: CPT

## 2022-05-19 ENCOUNTER — TELEPHONE (OUTPATIENT)
Dept: ORTHOPEDICS CLINIC | Facility: CLINIC | Age: 67
End: 2022-05-19

## 2022-05-20 ENCOUNTER — LAB ENCOUNTER (OUTPATIENT)
Dept: LAB | Age: 67
End: 2022-05-20
Attending: ORTHOPAEDIC SURGERY
Payer: MEDICAID

## 2022-05-20 DIAGNOSIS — Z00.00 ROUTINE GENERAL MEDICAL EXAMINATION AT A HEALTH CARE FACILITY: ICD-10-CM

## 2022-05-20 DIAGNOSIS — Z01.818 PREOPERATIVE EXAMINATION: ICD-10-CM

## 2022-05-20 DIAGNOSIS — G89.29 CHRONIC PAIN OF BOTH KNEES: ICD-10-CM

## 2022-05-20 DIAGNOSIS — Z01.818 PRE-OP TESTING: ICD-10-CM

## 2022-05-20 DIAGNOSIS — Z51.81 THERAPEUTIC DRUG MONITORING: ICD-10-CM

## 2022-05-20 DIAGNOSIS — M25.561 CHRONIC PAIN OF BOTH KNEES: ICD-10-CM

## 2022-05-20 DIAGNOSIS — M05.9 SEROPOSITIVE RHEUMATOID ARTHRITIS (HCC): ICD-10-CM

## 2022-05-20 DIAGNOSIS — M25.562 CHRONIC PAIN OF BOTH KNEES: ICD-10-CM

## 2022-05-20 LAB
ALBUMIN SERPL-MCNC: 3.2 G/DL (ref 3.4–5)
ALT SERPL-CCNC: 29 U/L
ANTIBODY SCREEN: NEGATIVE
AST SERPL-CCNC: 16 U/L (ref 15–37)
BASOPHILS # BLD AUTO: 0.01 X10(3) UL (ref 0–0.2)
BASOPHILS NFR BLD AUTO: 0.2 %
CREAT BLD-MCNC: 1.04 MG/DL
CRP SERPL-MCNC: <0.29 MG/DL (ref ?–0.3)
DEPRECATED RDW RBC AUTO: 51.7 FL (ref 35.1–46.3)
EOSINOPHIL # BLD AUTO: 0.14 X10(3) UL (ref 0–0.7)
EOSINOPHIL NFR BLD AUTO: 2.8 %
ERYTHROCYTE [DISTWIDTH] IN BLOOD BY AUTOMATED COUNT: 13.6 % (ref 11–15)
ERYTHROCYTE [SEDIMENTATION RATE] IN BLOOD: 15 MM/HR
HCT VFR BLD AUTO: 34.7 %
HGB BLD-MCNC: 10.8 G/DL
IMM GRANULOCYTES # BLD AUTO: 0 X10(3) UL (ref 0–1)
IMM GRANULOCYTES NFR BLD: 0 %
LYMPHOCYTES # BLD AUTO: 2.7 X10(3) UL (ref 1–4)
LYMPHOCYTES NFR BLD AUTO: 54 %
MCH RBC QN AUTO: 32 PG (ref 26–34)
MCHC RBC AUTO-ENTMCNC: 31.1 G/DL (ref 31–37)
MCV RBC AUTO: 102.7 FL
MONOCYTES # BLD AUTO: 0.28 X10(3) UL (ref 0.1–1)
MONOCYTES NFR BLD AUTO: 5.6 %
NEUTROPHILS # BLD AUTO: 1.87 X10 (3) UL (ref 1.5–7.7)
NEUTROPHILS # BLD AUTO: 1.87 X10(3) UL (ref 1.5–7.7)
NEUTROPHILS NFR BLD AUTO: 37.4 %
PLATELET # BLD AUTO: 182 10(3)UL (ref 150–450)
PLATELET MORPHOLOGY: NORMAL
RBC # BLD AUTO: 3.38 X10(6)UL
RH BLOOD TYPE: POSITIVE
RH BLOOD TYPE: POSITIVE
SARS-COV-2 RNA RESP QL NAA+PROBE: NOT DETECTED
WBC # BLD AUTO: 5 X10(3) UL (ref 4–11)

## 2022-05-20 PROCEDURE — 84460 ALANINE AMINO (ALT) (SGPT): CPT

## 2022-05-20 PROCEDURE — 86140 C-REACTIVE PROTEIN: CPT

## 2022-05-20 PROCEDURE — 87086 URINE CULTURE/COLONY COUNT: CPT

## 2022-05-20 PROCEDURE — 86901 BLOOD TYPING SEROLOGIC RH(D): CPT

## 2022-05-20 PROCEDURE — 36415 COLL VENOUS BLD VENIPUNCTURE: CPT

## 2022-05-20 PROCEDURE — 82040 ASSAY OF SERUM ALBUMIN: CPT

## 2022-05-20 PROCEDURE — 85025 COMPLETE CBC W/AUTO DIFF WBC: CPT

## 2022-05-20 PROCEDURE — 82565 ASSAY OF CREATININE: CPT

## 2022-05-20 PROCEDURE — 86850 RBC ANTIBODY SCREEN: CPT

## 2022-05-20 PROCEDURE — 87641 MR-STAPH DNA AMP PROBE: CPT

## 2022-05-20 PROCEDURE — 85652 RBC SED RATE AUTOMATED: CPT

## 2022-05-20 PROCEDURE — 86900 BLOOD TYPING SEROLOGIC ABO: CPT

## 2022-05-20 PROCEDURE — 87147 CULTURE TYPE IMMUNOLOGIC: CPT

## 2022-05-20 PROCEDURE — 84450 TRANSFERASE (AST) (SGOT): CPT

## 2022-05-21 LAB — MRSA DNA SPEC QL NAA+PROBE: NEGATIVE

## 2022-05-23 ENCOUNTER — HOSPITAL ENCOUNTER (OUTPATIENT)
Facility: HOSPITAL | Age: 67
Discharge: HOME HEALTH | End: 2022-05-24
Attending: ORTHOPAEDIC SURGERY | Admitting: ORTHOPAEDIC SURGERY
Payer: MEDICAID

## 2022-05-23 ENCOUNTER — ANESTHESIA (OUTPATIENT)
Dept: SURGERY | Facility: HOSPITAL | Age: 67
End: 2022-05-23
Payer: MEDICAID

## 2022-05-23 ENCOUNTER — ANESTHESIA EVENT (OUTPATIENT)
Dept: SURGERY | Facility: HOSPITAL | Age: 67
End: 2022-05-23
Payer: MEDICAID

## 2022-05-23 ENCOUNTER — APPOINTMENT (OUTPATIENT)
Dept: GENERAL RADIOLOGY | Facility: HOSPITAL | Age: 67
End: 2022-05-23
Attending: ORTHOPAEDIC SURGERY
Payer: MEDICAID

## 2022-05-23 DIAGNOSIS — Z01.818 PRE-OP TESTING: Primary | ICD-10-CM

## 2022-05-23 DIAGNOSIS — M17.11 PRIMARY OSTEOARTHRITIS OF RIGHT KNEE: ICD-10-CM

## 2022-05-23 PROBLEM — E11.9 DIABETES MELLITUS, TYPE 2 (HCC): Chronic | Status: ACTIVE | Noted: 2022-05-23

## 2022-05-23 PROBLEM — M06.9 RHEUMATOID ARTHRITIS (HCC): Chronic | Status: ACTIVE | Noted: 2022-05-23

## 2022-05-23 LAB
EST. AVERAGE GLUCOSE BLD GHB EST-MCNC: 131 MG/DL (ref 68–126)
GLUCOSE BLDC GLUCOMTR-MCNC: 155 MG/DL (ref 70–99)
GLUCOSE BLDC GLUCOMTR-MCNC: 184 MG/DL (ref 70–99)
GLUCOSE BLDC GLUCOMTR-MCNC: 226 MG/DL (ref 70–99)
GLUCOSE BLDC GLUCOMTR-MCNC: 75 MG/DL (ref 70–99)
GLUCOSE BLDC GLUCOMTR-MCNC: 81 MG/DL (ref 70–99)
GLUCOSE BLDC GLUCOMTR-MCNC: 94 MG/DL (ref 70–99)
HBA1C MFR BLD: 6.2 % (ref ?–5.7)

## 2022-05-23 PROCEDURE — 73560 X-RAY EXAM OF KNEE 1 OR 2: CPT | Performed by: ORTHOPAEDIC SURGERY

## 2022-05-23 PROCEDURE — 99202 OFFICE O/P NEW SF 15 MIN: CPT | Performed by: HOSPITALIST

## 2022-05-23 PROCEDURE — 0SRC0J9 REPLACEMENT OF RIGHT KNEE JOINT WITH SYNTHETIC SUBSTITUTE, CEMENTED, OPEN APPROACH: ICD-10-PCS | Performed by: ORTHOPAEDIC SURGERY

## 2022-05-23 DEVICE — IMPLANTABLE DEVICE
Type: IMPLANTABLE DEVICE | Site: KNEE | Status: FUNCTIONAL
Brand: PERSONA® VIVACIT-E®

## 2022-05-23 DEVICE — BIOMET BC R 1X40 US: Type: IMPLANTABLE DEVICE | Site: KNEE | Status: FUNCTIONAL

## 2022-05-23 DEVICE — IMPLANTABLE DEVICE
Type: IMPLANTABLE DEVICE | Site: KNEE | Status: FUNCTIONAL
Brand: PERSONA®

## 2022-05-23 DEVICE — IMPLANTABLE DEVICE
Type: IMPLANTABLE DEVICE | Site: KNEE | Status: FUNCTIONAL
Brand: PERSONA® NATURAL TIBIA®

## 2022-05-23 RX ORDER — METOCLOPRAMIDE HYDROCHLORIDE 5 MG/ML
INJECTION INTRAMUSCULAR; INTRAVENOUS AS NEEDED
Status: DISCONTINUED | OUTPATIENT
Start: 2022-05-23 | End: 2022-05-23 | Stop reason: SURG

## 2022-05-23 RX ORDER — TRANEXAMIC ACID 10 MG/ML
INJECTION, SOLUTION INTRAVENOUS AS NEEDED
Status: DISCONTINUED | OUTPATIENT
Start: 2022-05-23 | End: 2022-05-23 | Stop reason: SURG

## 2022-05-23 RX ORDER — PROCHLORPERAZINE EDISYLATE 5 MG/ML
5 INJECTION INTRAMUSCULAR; INTRAVENOUS ONCE AS NEEDED
Status: COMPLETED | OUTPATIENT
Start: 2022-05-23 | End: 2022-05-23

## 2022-05-23 RX ORDER — NICOTINE POLACRILEX 4 MG
15 LOZENGE BUCCAL
Status: DISCONTINUED | OUTPATIENT
Start: 2022-05-23 | End: 2022-05-24

## 2022-05-23 RX ORDER — SODIUM CHLORIDE 0.9 % (FLUSH) 0.9 %
10 SYRINGE (ML) INJECTION AS NEEDED
Status: DISCONTINUED | OUTPATIENT
Start: 2022-05-23 | End: 2022-05-24

## 2022-05-23 RX ORDER — MAGNESIUM HYDROXIDE 1200 MG/15ML
LIQUID ORAL CONTINUOUS PRN
Status: COMPLETED | OUTPATIENT
Start: 2022-05-23 | End: 2022-05-23

## 2022-05-23 RX ORDER — NICOTINE POLACRILEX 4 MG
30 LOZENGE BUCCAL
Status: DISCONTINUED | OUTPATIENT
Start: 2022-05-23 | End: 2022-05-24

## 2022-05-23 RX ORDER — HYDROMORPHONE HYDROCHLORIDE 1 MG/ML
0.4 INJECTION, SOLUTION INTRAMUSCULAR; INTRAVENOUS; SUBCUTANEOUS
Status: DISCONTINUED | OUTPATIENT
Start: 2022-05-23 | End: 2022-05-23

## 2022-05-23 RX ORDER — MIDAZOLAM HYDROCHLORIDE 1 MG/ML
INJECTION INTRAMUSCULAR; INTRAVENOUS AS NEEDED
Status: DISCONTINUED | OUTPATIENT
Start: 2022-05-23 | End: 2022-05-23 | Stop reason: SURG

## 2022-05-23 RX ORDER — DIPHENHYDRAMINE HYDROCHLORIDE 50 MG/ML
12.5 INJECTION INTRAMUSCULAR; INTRAVENOUS EVERY 4 HOURS PRN
Status: DISCONTINUED | OUTPATIENT
Start: 2022-05-23 | End: 2022-05-24

## 2022-05-23 RX ORDER — BUPIVACAINE HYDROCHLORIDE 7.5 MG/ML
INJECTION, SOLUTION INTRASPINAL
Status: COMPLETED | OUTPATIENT
Start: 2022-05-23 | End: 2022-05-23

## 2022-05-23 RX ORDER — METOCLOPRAMIDE HYDROCHLORIDE 5 MG/ML
10 INJECTION INTRAMUSCULAR; INTRAVENOUS EVERY 6 HOURS PRN
Status: DISCONTINUED | OUTPATIENT
Start: 2022-05-23 | End: 2022-05-24

## 2022-05-23 RX ORDER — OXYCODONE HCL 10 MG/1
10 TABLET, FILM COATED, EXTENDED RELEASE ORAL EVERY 12 HOURS
Status: DISCONTINUED | OUTPATIENT
Start: 2022-05-23 | End: 2022-05-24

## 2022-05-23 RX ORDER — SODIUM CHLORIDE, SODIUM LACTATE, POTASSIUM CHLORIDE, CALCIUM CHLORIDE 600; 310; 30; 20 MG/100ML; MG/100ML; MG/100ML; MG/100ML
INJECTION, SOLUTION INTRAVENOUS CONTINUOUS
Status: DISCONTINUED | OUTPATIENT
Start: 2022-05-23 | End: 2022-05-23 | Stop reason: HOSPADM

## 2022-05-23 RX ORDER — ONDANSETRON 2 MG/ML
INJECTION INTRAMUSCULAR; INTRAVENOUS AS NEEDED
Status: DISCONTINUED | OUTPATIENT
Start: 2022-05-23 | End: 2022-05-23 | Stop reason: SURG

## 2022-05-23 RX ORDER — HYDROCODONE BITARTRATE AND ACETAMINOPHEN 7.5; 325 MG/1; MG/1
1 TABLET ORAL EVERY 4 HOURS PRN
Status: DISCONTINUED | OUTPATIENT
Start: 2022-05-23 | End: 2022-05-24

## 2022-05-23 RX ORDER — HYDROCODONE BITARTRATE AND ACETAMINOPHEN 7.5; 325 MG/1; MG/1
1 TABLET ORAL EVERY 6 HOURS PRN
Status: DISCONTINUED | OUTPATIENT
Start: 2022-05-23 | End: 2022-05-23

## 2022-05-23 RX ORDER — PREDNISONE 20 MG/1
20 TABLET ORAL DAILY
Status: DISCONTINUED | OUTPATIENT
Start: 2022-05-23 | End: 2022-05-24

## 2022-05-23 RX ORDER — CEFAZOLIN SODIUM/WATER 2 G/20 ML
2 SYRINGE (ML) INTRAVENOUS ONCE
Status: COMPLETED | OUTPATIENT
Start: 2022-05-23 | End: 2022-05-23

## 2022-05-23 RX ORDER — ASPIRIN 325 MG
325 TABLET, DELAYED RELEASE (ENTERIC COATED) ORAL ONCE
Status: COMPLETED | OUTPATIENT
Start: 2022-05-23 | End: 2022-05-23

## 2022-05-23 RX ORDER — ONDANSETRON 2 MG/ML
4 INJECTION INTRAMUSCULAR; INTRAVENOUS EVERY 6 HOURS PRN
Status: DISCONTINUED | OUTPATIENT
Start: 2022-05-23 | End: 2022-05-23 | Stop reason: HOSPADM

## 2022-05-23 RX ORDER — POLYETHYLENE GLYCOL 3350 17 G/17G
17 POWDER, FOR SOLUTION ORAL DAILY PRN
Status: DISCONTINUED | OUTPATIENT
Start: 2022-05-23 | End: 2022-05-24

## 2022-05-23 RX ORDER — SODIUM CHLORIDE, SODIUM LACTATE, POTASSIUM CHLORIDE, CALCIUM CHLORIDE 600; 310; 30; 20 MG/100ML; MG/100ML; MG/100ML; MG/100ML
INJECTION, SOLUTION INTRAVENOUS CONTINUOUS
Status: DISCONTINUED | OUTPATIENT
Start: 2022-05-23 | End: 2022-05-24

## 2022-05-23 RX ORDER — LIDOCAINE HYDROCHLORIDE 10 MG/ML
INJECTION, SOLUTION EPIDURAL; INFILTRATION; INTRACAUDAL; PERINEURAL AS NEEDED
Status: DISCONTINUED | OUTPATIENT
Start: 2022-05-23 | End: 2022-05-23 | Stop reason: SURG

## 2022-05-23 RX ORDER — SENNOSIDES 8.6 MG
17.2 TABLET ORAL NIGHTLY PRN
Status: DISCONTINUED | OUTPATIENT
Start: 2022-05-23 | End: 2022-05-24

## 2022-05-23 RX ORDER — MORPHINE SULFATE 1 MG/ML
INJECTION, SOLUTION EPIDURAL; INTRATHECAL; INTRAVENOUS
Status: COMPLETED | OUTPATIENT
Start: 2022-05-23 | End: 2022-05-23

## 2022-05-23 RX ORDER — DEXTROSE, SODIUM CHLORIDE, AND POTASSIUM CHLORIDE 5; .45; .15 G/100ML; G/100ML; G/100ML
INJECTION INTRAVENOUS CONTINUOUS
Status: DISCONTINUED | OUTPATIENT
Start: 2022-05-23 | End: 2022-05-24

## 2022-05-23 RX ORDER — HALOPERIDOL 5 MG/ML
0.5 INJECTION INTRAMUSCULAR ONCE AS NEEDED
Status: DISCONTINUED | OUTPATIENT
Start: 2022-05-23 | End: 2022-05-23

## 2022-05-23 RX ORDER — ACETAMINOPHEN 325 MG/1
650 TABLET ORAL EVERY 6 HOURS PRN
Status: DISCONTINUED | OUTPATIENT
Start: 2022-05-23 | End: 2022-05-23

## 2022-05-23 RX ORDER — CEFAZOLIN SODIUM/WATER 2 G/20 ML
2 SYRINGE (ML) INTRAVENOUS EVERY 8 HOURS
Status: COMPLETED | OUTPATIENT
Start: 2022-05-23 | End: 2022-05-24

## 2022-05-23 RX ORDER — DEXAMETHASONE SODIUM PHOSPHATE 4 MG/ML
VIAL (ML) INJECTION AS NEEDED
Status: DISCONTINUED | OUTPATIENT
Start: 2022-05-23 | End: 2022-05-23 | Stop reason: SURG

## 2022-05-23 RX ORDER — HYDROCODONE BITARTRATE AND ACETAMINOPHEN 7.5; 325 MG/1; MG/1
2 TABLET ORAL EVERY 6 HOURS PRN
Status: DISCONTINUED | OUTPATIENT
Start: 2022-05-23 | End: 2022-05-23

## 2022-05-23 RX ORDER — NALOXONE HYDROCHLORIDE 0.4 MG/ML
0.08 INJECTION, SOLUTION INTRAMUSCULAR; INTRAVENOUS; SUBCUTANEOUS
Status: DISCONTINUED | OUTPATIENT
Start: 2022-05-23 | End: 2022-05-23

## 2022-05-23 RX ORDER — ONDANSETRON 2 MG/ML
4 INJECTION INTRAMUSCULAR; INTRAVENOUS ONCE AS NEEDED
Status: DISCONTINUED | OUTPATIENT
Start: 2022-05-23 | End: 2022-05-23

## 2022-05-23 RX ORDER — ONDANSETRON 2 MG/ML
4 INJECTION INTRAMUSCULAR; INTRAVENOUS EVERY 4 HOURS PRN
Status: DISCONTINUED | OUTPATIENT
Start: 2022-05-23 | End: 2022-05-24

## 2022-05-23 RX ORDER — LOSARTAN POTASSIUM 50 MG/1
50 TABLET ORAL DAILY
Status: DISCONTINUED | OUTPATIENT
Start: 2022-05-24 | End: 2022-05-24

## 2022-05-23 RX ORDER — DIPHENHYDRAMINE HCL 25 MG
25 CAPSULE ORAL EVERY 4 HOURS PRN
Status: DISCONTINUED | OUTPATIENT
Start: 2022-05-23 | End: 2022-05-24

## 2022-05-23 RX ORDER — NALBUPHINE HCL 10 MG/ML
2.5 AMPUL (ML) INJECTION EVERY 4 HOURS PRN
Status: DISCONTINUED | OUTPATIENT
Start: 2022-05-23 | End: 2022-05-23

## 2022-05-23 RX ORDER — FOLIC ACID 1 MG/1
1 TABLET ORAL DAILY
Status: DISCONTINUED | OUTPATIENT
Start: 2022-05-23 | End: 2022-05-24

## 2022-05-23 RX ORDER — BISACODYL 10 MG
10 SUPPOSITORY, RECTAL RECTAL
Status: DISCONTINUED | OUTPATIENT
Start: 2022-05-23 | End: 2022-05-24

## 2022-05-23 RX ORDER — SODIUM PHOSPHATE, DIBASIC AND SODIUM PHOSPHATE, MONOBASIC 7; 19 G/133ML; G/133ML
1 ENEMA RECTAL ONCE AS NEEDED
Status: DISCONTINUED | OUTPATIENT
Start: 2022-05-23 | End: 2022-05-24

## 2022-05-23 RX ORDER — CELECOXIB 200 MG/1
200 CAPSULE ORAL 2 TIMES DAILY
Status: DISCONTINUED | OUTPATIENT
Start: 2022-05-23 | End: 2022-05-24

## 2022-05-23 RX ORDER — DIPHENHYDRAMINE HCL 25 MG
25 CAPSULE ORAL EVERY 4 HOURS PRN
Status: DISCONTINUED | OUTPATIENT
Start: 2022-05-23 | End: 2022-05-23

## 2022-05-23 RX ORDER — ASPIRIN 325 MG
325 TABLET ORAL 2 TIMES DAILY
Status: DISCONTINUED | OUTPATIENT
Start: 2022-05-23 | End: 2022-05-24

## 2022-05-23 RX ORDER — ACETAMINOPHEN 500 MG
1000 TABLET ORAL ONCE
Status: COMPLETED | OUTPATIENT
Start: 2022-05-23 | End: 2022-05-23

## 2022-05-23 RX ORDER — HYDROMORPHONE HYDROCHLORIDE 1 MG/ML
0.6 INJECTION, SOLUTION INTRAMUSCULAR; INTRAVENOUS; SUBCUTANEOUS
Status: DISCONTINUED | OUTPATIENT
Start: 2022-05-23 | End: 2022-05-23

## 2022-05-23 RX ORDER — DIPHENHYDRAMINE HYDROCHLORIDE 50 MG/ML
25 INJECTION INTRAMUSCULAR; INTRAVENOUS ONCE AS NEEDED
Status: ACTIVE | OUTPATIENT
Start: 2022-05-23 | End: 2022-05-23

## 2022-05-23 RX ORDER — DEXTROSE MONOHYDRATE 25 G/50ML
50 INJECTION, SOLUTION INTRAVENOUS
Status: DISCONTINUED | OUTPATIENT
Start: 2022-05-23 | End: 2022-05-24

## 2022-05-23 RX ORDER — DIPHENHYDRAMINE HYDROCHLORIDE 50 MG/ML
12.5 INJECTION INTRAMUSCULAR; INTRAVENOUS EVERY 4 HOURS PRN
Status: DISCONTINUED | OUTPATIENT
Start: 2022-05-23 | End: 2022-05-23

## 2022-05-23 RX ADMIN — ONDANSETRON 4 MG: 2 INJECTION INTRAMUSCULAR; INTRAVENOUS at 07:36:00

## 2022-05-23 RX ADMIN — METOCLOPRAMIDE HYDROCHLORIDE 10 MG: 5 INJECTION INTRAMUSCULAR; INTRAVENOUS at 07:46:00

## 2022-05-23 RX ADMIN — MORPHINE SULFATE 0.25 MG: 1 INJECTION, SOLUTION EPIDURAL; INTRATHECAL; INTRAVENOUS at 07:41:00

## 2022-05-23 RX ADMIN — SODIUM CHLORIDE, SODIUM LACTATE, POTASSIUM CHLORIDE, CALCIUM CHLORIDE: 600; 310; 30; 20 INJECTION, SOLUTION INTRAVENOUS at 08:17:00

## 2022-05-23 RX ADMIN — BUPIVACAINE HYDROCHLORIDE 1.4 ML: 7.5 INJECTION, SOLUTION INTRASPINAL at 07:41:00

## 2022-05-23 RX ADMIN — SODIUM CHLORIDE, SODIUM LACTATE, POTASSIUM CHLORIDE, CALCIUM CHLORIDE: 600; 310; 30; 20 INJECTION, SOLUTION INTRAVENOUS at 09:41:00

## 2022-05-23 RX ADMIN — MIDAZOLAM HYDROCHLORIDE 2 MG: 1 INJECTION INTRAMUSCULAR; INTRAVENOUS at 07:36:00

## 2022-05-23 RX ADMIN — CEFAZOLIN SODIUM/WATER 2 G: 2 G/20 ML SYRINGE (ML) INTRAVENOUS at 07:45:00

## 2022-05-23 RX ADMIN — LIDOCAINE HYDROCHLORIDE 50 MG: 10 INJECTION, SOLUTION EPIDURAL; INFILTRATION; INTRACAUDAL; PERINEURAL at 07:43:00

## 2022-05-23 RX ADMIN — SODIUM CHLORIDE, SODIUM LACTATE, POTASSIUM CHLORIDE, CALCIUM CHLORIDE: 600; 310; 30; 20 INJECTION, SOLUTION INTRAVENOUS at 07:36:00

## 2022-05-23 RX ADMIN — DEXAMETHASONE SODIUM PHOSPHATE 8 MG: 4 MG/ML VIAL (ML) INJECTION at 07:45:00

## 2022-05-23 RX ADMIN — TRANEXAMIC ACID 1000 MG: 10 INJECTION, SOLUTION INTRAVENOUS at 07:46:00

## 2022-05-23 RX ADMIN — SODIUM CHLORIDE, SODIUM LACTATE, POTASSIUM CHLORIDE, CALCIUM CHLORIDE: 600; 310; 30; 20 INJECTION, SOLUTION INTRAVENOUS at 09:21:00

## 2022-05-23 NOTE — ANESTHESIA PROCEDURE NOTES
Spinal Block    Date/Time: 5/23/2022 7:41 AM  Performed by:  Freddy Kohli CRNA  Authorized by: Pelon Whitmore MD       General Information and Staff    Start Time:  5/23/2022 7:39 AM  End Time:  5/23/2022 7:41 AM  CRNA:  Freddy Kohli CRNA  Performed by:  CRNA  Patient Location:  OR  Preanesthetic Checklist: patient identified, IV checked, risks and benefits discussed, monitors and equipment checked, pre-op evaluation, timeout performed, anesthesia consent and sterile technique used      Procedure Details    Patient Position:  Sitting  Prep: ChloraPrep    Monitoring:  Cardiac monitor  Approach:  Midline  Location:  L4-5  Injection Technique:  Single-shot    Needle    Needle Type:  Pencil-tip  Needle Gauge:  24 G  Needle Length:  3.5 in    Assessment    Sensory Level:  T6  Events: clear CSF, CSF aspirated, well tolerated and blood negative      Additional Comments

## 2022-05-23 NOTE — OPERATIVE REPORT
Operative Note    Patient Name: Morena Hastings    Preoperative Diagnosis: Primary osteoarthritis of right knee [M17.11]    Postoperative Diagnosis: Primary osteoarthritis of right knee [M17.11]    Primary Surgeon: Amber Wynne MD     Assistant: Karolina Jane    Procedures: R TKA with PSI, preoperative computer navigation    Surgical Findings: above    Anesthesia: Spinal    Complications: none    Specimen: R knee bone and soft tissue to pathology    Drains: hemovac, miranda    Condition: stable to RR    Estimated Blood Loss: Nadine Gamble MD

## 2022-05-23 NOTE — OPERATIVE REPORT
[facility]    PATIENT'S NAME: ANA Jensen   ATTENDING PHYSICIAN: Pawel Ashley MD   OPERATING PHYSICIAN: Pawel Ashley MD   PATIENT ACCOUNT#:   [id number]    LOCATION:  [address] [place] [address] #:   [id number]       YOB: 1955  ADMISSION DATE:       05/23/2022      OPERATION DATE:  05/23/2022    OPERATIVE REPORT      PREOPERATIVE DIAGNOSIS:  Right knee rheumatoid arthritis. POSTOPERATIVE DIAGNOSIS:  Right knee rheumatoid arthritis. PROCEDURE:  Right total knee arthroplasty with patient-specific instrumentation and preoperative computer navigation. ASSISTANT:  NISHANT Bautista. ANESTHESIA:  Spinal with sedation. COMPLICATIONS:  None. BLOOD LOSS:  50 mL. SPECIMEN:  Right knee bone and soft tissue to Pathology. DRAIN:  Hemovac drain to suction device, Rosenberg catheter to gravity drainage. INDICATIONS:  The patient is a 28-year-old female with a history of progressive right knee pain unresponsive to conservative care. Preoperative physical findings and imaging studies were consistent with significant degeneration of the right knee. She failed conservative treatments, including anti-inflammatory medications, therapeutic exercise, disease-modifying agents for rheumatoid arthritis, activity modifications, and attempts at weight loss. After discussion with the patient the risks and benefits of proceeding with operative treatment of the right knee, she wished to proceed with surgery. OPERATIVE TECHNIQUE:  The patient was identified in the preoperative holding area and the appropriate consents were obtained. She was taken to the operating room and placed in a supine position on the operating room table. After adequate spinal anesthesia and sedation was achieved, a Rosenberg catheter was inserted using sterile technique. A tourniquet was placed on the right thigh. An SCD device was placed on left lower extremity.   The patient was given preoperative IV antibiotics and IV tranexamic acid. Next the right knee and lower extremity were prepped and draped in a sterile fashion. The right foot was padded and placed in the foot brewster for the [facility] type knee positioner. The extremity was exsanguinated, and the tourniquet was inflated to 250 mmHg. A longitudinal incision was then made over the anteromedial portion of the knee. Medial parapatellar retinacular incision was made. Portions of the superficial medial collateral ligament were sharply elevated from the anteromedial proximal tibia. The menisci and anterior cruciate ligament were excised. Next the patella was everted, and a freehand method was used to resect 8 mm of articular cartilage and articular surface. Care was taken to make the patellar cut parallel to the dorsal surface of the patella. The patella was sized, and a size 32 implant was chosen. The drill guide was applied to the cut surface in a medial superior position. Three lug holes were drilled. The patellar trial was inserted and appeared to fit appropriately with good coverage and seeding. Patellar protector was next inserted, and attention was turned to the femur. The knee was brought into a flexed position. The patient-specific pin guide for the anterior distal femur was applied. The anterior and distal pins were placed. Distal pins were removed. The distal cutting guide was applied and a standard cut was made. Bone fragments were sized on the back table and appeared consistent with the preoperative plan. The appropriate valgus cut was made. Next, the 4-in-1 cutting guide for a size 5 femur was inserted. The alignment was checked and it appeared rotationally aligned properly with Whitesides line. The anterior, posterior, and chamfer cuts were made. The posterior bone fragments were sized on the back table and appeared consistent with the preoperative plan. Next, attention was turned to the tibia.   The patient-specific pin guide for the proximal tibia was applied to the anteromedial tibia. Anterior and superior pins were placed. Superior pins were removed. The cutting guide was inserted and the alignment was checked. It appeared appropriately aligned with the tibia in all 3 planes. The tibial cut was made. The bone fragment was sized on the back table and appeared consistent with the preoperative plan. Next the size D tibial trial was inserted. This was aligned with the superior drill holes and pinned into position. It appeared appropriately aligned rotationally with the medial third of the tibial tubercle. The tibia was finished with the reamer and keel cutter. Tibial trial was left in situ. The femoral trial was replaced. The lug holes were drilled after appropriate lateralization. A standard femoral implant was chosen. A 10 mm polyethylene-bearing trial was inserted. This was a 10 mm medial congruent trial.  The knee was brought through a range of motion; it was adequately stable in all planes. Full range of motion was achieved. There was no impingement on the implants. Trial implants were next removed, and using third generation cementing technique we cemented in a size D tibia, a size 5 femur, and a size 32 patella. These were Sylvia Persona implants. Excess cement was removed. We trialed and chose a 10 mm polyethylene-bearing. This was inserted without difficulty and stamped into position using the Sylvia . Again the knee was brought through a range of motion and it was adequately stable in all planes. Full range of motion was achieved. There was no impingement on the implants. The knee was suction irrigated. Tourniquet was deflated. Attention was paid to hemostasis. Retinacular layer was repaired with 0 Ethibond sutures in interrupted fashion. Skin and subcutaneous layers were closed with 2-0 Vicryl sutures and skin staples.   A sterile dressing was applied, including an ice pack and Ace wrap as well as a Mepilex-type dressing on the skin. The patient's anesthesia was reversed. She was taken to the recovery room in stable condition. All sponge and instrument counts were reported as correct. The attending physician, Dr. Crystal Moritz, was present and performed all critical portions of the procedure. There were no complications. The first assistant was medically necessary for the surgery. He assisted with patient positioning, retraction of soft tissues for accurate placement of the cutting guides and implants. He also assisted with cement removal, hemostasis, and wound closure. Without the aid of the assistant, the surgical procedure would not have been possible. Dictated By Dotty Espino.  Crystal Moritz, MD  d: 05/23/2022 09:22:18  t: 05/23/2022 17:58:23  Job [id number]  DLO/

## 2022-05-23 NOTE — ANESTHESIA POSTPROCEDURE EVALUATION
Patient: Ani Sifuentes    Procedure Summary     Date: 05/23/22 Room / Location: [facility] MAIN OR 04 / [facility] MAIN OR    Anesthesia Start: 3224 Anesthesia Stop:     Procedure: Right total knee arthroplasty with patient specific instruments (Right Knee) Diagnosis:       Primary osteoarthritis of right knee      (Primary osteoarthritis of right knee [M17.11])    Surgeons: Jeet Caballero MD Anesthesiologist: James Moncada MD    Anesthesia Type: spinal, MAC ASA Status: 2          Anesthesia Type: spinal, MAC    Vitals Value Taken Time   /67 05/23/22 0940   Temp 36.5 05/23/22 0940   Pulse 72 05/23/22 0940   Resp 16 05/23/22 0940   SpO2 98% 05/23/22 0940       [facility] AN Post Evaluation:   Patient Evaluated in PACU  Patient Participation: complete - patient participated  Level of Consciousness: awake and alert  Pain Score: 0  Pain Management: adequate  Airway Patency:patent  Yes    Cardiovascular Status: acceptable  Respiratory Status: acceptable  Postoperative Hydration acceptable  Comments: Able to move bilateral lower extremities;   Denies HA/N/Pain; DEVI Franco CRNA  5/23/2022 9:40 AM

## 2022-05-23 NOTE — PLAN OF CARE
Problem: Patient Centered Care  Goal: Patient preferences are identified and integrated in the patient's plan of care  Description: Interventions:  - What would you like us to know as we care for you?   - Provide timely, complete, and accurate information to patient/family  - Incorporate patient and family knowledge, values, beliefs, and cultural backgrounds into the planning and delivery of care  - Encourage patient/family to participate in care and decision-making at the level they choose  - Honor patient and family perspectives and choices  Outcome: Progressing     Problem: Diabetes/Glucose Control  Goal: Glucose maintained within prescribed range  Description: INTERVENTIONS:  - Monitor Blood Glucose as ordered  - Assess for signs and symptoms of hyperglycemia and hypoglycemia  - Administer ordered medications to maintain glucose within target range  - Assess barriers to adequate nutritional intake and initiate nutrition consult as needed  - Instruct patient on self management of diabetes  Outcome: Progressing     Problem: Patient/Family Goals  Goal: Patient/Family Long Term Goal  Description: Patient's Long Term Goal:     Interventions:  -   - See additional Care Plan goals for specific interventions  Outcome: Progressing  Goal: Patient/Family Short Term Goal  Description: Patient's Short Term Goal:     Interventions:   -   - See additional Care Plan goals for specific interventions  Outcome: Progressing     Problem: PAIN - ADULT  Goal: Verbalizes/displays adequate comfort level or patient's stated pain goal  Description: INTERVENTIONS:  - Encourage pt to monitor pain and request assistance  - Assess pain using appropriate pain scale  - Administer analgesics based on type and severity of pain and evaluate response  - Implement non-pharmacological measures as appropriate and evaluate response  - Consider cultural and social influences on pain and pain management  - Manage/alleviate anxiety  - Utilize distraction and/or relaxation techniques  - Monitor for opioid side effects  - Notify MD/LIP if interventions unsuccessful or patient reports new pain  - Anticipate increased pain with activity and pre-medicate as appropriate  Outcome: Progressing     Problem: RISK FOR INFECTION - ADULT  Goal: Absence of fever/infection during anticipated neutropenic period  Description: INTERVENTIONS  - Monitor WBC  - Administer growth factors as ordered  - Implement neutropenic guidelines  Outcome: Progressing     Problem: SAFETY ADULT - FALL  Goal: Free from fall injury  Description: INTERVENTIONS:  - Assess pt frequently for physical needs  - Identify cognitive and physical deficits and behaviors that affect risk of falls. - Andrews fall precautions as indicated by assessment.  - Educate pt/family on patient safety including physical limitations  - Instruct pt to call for assistance with activity based on assessment  - Modify environment to reduce risk of injury  - Provide assistive devices as appropriate  - Consider OT/PT consult to assist with strengthening/mobility  - Encourage toileting schedule  Outcome: Progressing     Problem: Altered Communication/Language Barrier  Goal: Patient/Family is able to understand and participate in their care  Description: Interventions:  - Assess communication ability and preferred communication style  - Implement communication aides and strategies  - Use visual cues when possible  - Listen attentively, be patient, do not interrupt  - Minimize distractions  - Allow time for understanding and response  - Establish method for patient to ask for assistance (call light)  - Provide an  as needed  - Communicate barriers and strategies to overcome with those who interact with patient  Outcome: Ashley Sierra received from [facility] right TKA. Romanian speaking Son Lino Hendrix at bedside assisted with translation.  Cms intact denies any numbness or tingling, and denies any pain at this time. Breathing on room air, encouraged the use of IS every hour while awake. miranda to gravity. Iv fluids infusion. Accuchecks done prior to meals. tolerated diet well . Dressing to right knee acewrap with gel ice. Hemovac x1. scds in use. PT/OT eval is pending. Safety measures applied and reviewed, Non skid socks in use. bed and chair exit alarm is in use,call light within reach. Bed is in lowest position. Knee replacement pathway given and reviewed. @4392 worked with PT able to ambulate to chair, x2 episodes of nausea and vomiting triggered with movement. Medicated with compazine. Added reglan prn.

## 2022-05-24 VITALS
OXYGEN SATURATION: 96 % | RESPIRATION RATE: 16 BRPM | BODY MASS INDEX: 28.17 KG/M2 | HEART RATE: 72 BPM | DIASTOLIC BLOOD PRESSURE: 81 MMHG | WEIGHT: 159 LBS | HEIGHT: 63 IN | SYSTOLIC BLOOD PRESSURE: 150 MMHG | TEMPERATURE: 98 F

## 2022-05-24 LAB
ANION GAP SERPL CALC-SCNC: 4 MMOL/L (ref 0–18)
BUN BLD-MCNC: 18 MG/DL (ref 7–18)
BUN/CREAT SERPL: 18.2 (ref 10–20)
CALCIUM BLD-MCNC: 8.3 MG/DL (ref 8.5–10.1)
CHLORIDE SERPL-SCNC: 107 MMOL/L (ref 98–112)
CO2 SERPL-SCNC: 30 MMOL/L (ref 21–32)
CREAT BLD-MCNC: 0.99 MG/DL
DEPRECATED RDW RBC AUTO: 48.4 FL (ref 35.1–46.3)
ERYTHROCYTE [DISTWIDTH] IN BLOOD BY AUTOMATED COUNT: 13.2 % (ref 11–15)
GLUCOSE BLD-MCNC: 135 MG/DL (ref 70–99)
GLUCOSE BLDC GLUCOMTR-MCNC: 101 MG/DL (ref 70–99)
HCT VFR BLD AUTO: 29.1 %
HGB BLD-MCNC: 9.3 G/DL
MCH RBC QN AUTO: 32.1 PG (ref 26–34)
MCHC RBC AUTO-ENTMCNC: 32 G/DL (ref 31–37)
MCV RBC AUTO: 100.3 FL
OSMOLALITY SERPL CALC.SUM OF ELEC: 296 MOSM/KG (ref 275–295)
PLATELET # BLD AUTO: 128 10(3)UL (ref 150–450)
POTASSIUM SERPL-SCNC: 4.1 MMOL/L (ref 3.5–5.1)
RBC # BLD AUTO: 2.9 X10(6)UL
SODIUM SERPL-SCNC: 141 MMOL/L (ref 136–145)
WBC # BLD AUTO: 9 X10(3) UL (ref 4–11)

## 2022-05-24 RX ORDER — CELECOXIB 200 MG/1
200 CAPSULE ORAL DAILY
Qty: 30 CAPSULE | Refills: 0 | Status: SHIPPED | OUTPATIENT
Start: 2022-05-24

## 2022-05-24 RX ORDER — ASPIRIN 325 MG
325 TABLET ORAL 2 TIMES DAILY
Qty: 28 TABLET | Refills: 0 | Status: SHIPPED | OUTPATIENT
Start: 2022-05-24

## 2022-05-24 RX ORDER — ADALIMUMAB 40MG/0.4ML
40 KIT SUBCUTANEOUS
Qty: 12 EACH | Refills: 3 | Status: SHIPPED | OUTPATIENT
Start: 2022-06-07

## 2022-05-24 RX ORDER — HYDROCODONE BITARTRATE AND ACETAMINOPHEN 7.5; 325 MG/1; MG/1
1 TABLET ORAL EVERY 4 HOURS PRN
Qty: 30 TABLET | Refills: 0 | Status: SHIPPED | OUTPATIENT
Start: 2022-05-24

## 2022-05-24 RX ORDER — POLYETHYLENE GLYCOL 3350 17 G/17G
17 POWDER, FOR SOLUTION ORAL DAILY PRN
Qty: 24 EACH | Refills: 0 | Status: SHIPPED | OUTPATIENT
Start: 2022-05-24

## 2022-05-24 NOTE — PHYSICAL THERAPY NOTE
PHYSICAL THERAPY KNEE TREATMENT NOTE - INPATIENT     Room Number: 407/407-A             Presenting Problem: s/p right TKA 22  Co-Morbidities : ra, oa, dm    Problem List  Principal Problem:    Primary osteoarthritis of right knee  Active Problems:    Hypertension, essential    Rheumatoid arthritis ([address]    Diabetes mellitus, type 2 (HCC)      PHYSICAL THERAPY ASSESSMENT     Pt seen BID. Chart reviewed. RN approved pt participation in PT RX. Min a for bed mobility and transfer. EOB sitting balance activity with emphasis on core stabilization. Family present;family education provided;all questions and concerns addressed,Pt anb 2 x 100 ft with RW and CGA;cuing for gait pattern as well as for postural awareness. Navigated 4 stairs with CGa. Ther ex. Pt ended session sitting up in chair;call light within reach. RN aware. The patient's Approx Degree of Impairment: 46.58% has been calculated based on documentation in the [facility] '6 clicks' Inpatient Basic Mobility Short Form. Research supports that patients with this level of impairment may benefit from Home with [address]  PT Discharge Recommendations: Home with home health PT    PLAN  PT Treatment Plan: Bed mobility; Endurance;Gait training    SUBJECTIVE  Pt reports  Being ready for PT RX    OBJECTIVE  Precautions: Bed/chair alarm    WEIGHT BEARING STATUS        R Lower Extremity: Weight Bearing as Tolerated       PAIN ASSESSMENT   Ratin  Location: right knee  Management Techniques: Activity promotion    BALANCE  Static Sitting: Good  Dynamic Sitting: Good  Static Standing: Fair  Dynamic Standing: Poor +    ACTIVITY TOLERANCE           BP: 150/81  BP Location: Right arm  BP Method: Automatic  Patient Position: Lying    O2 WALK       AM-PAC '6-Clicks' INPATIENT SHORT FORM - BASIC MOBILITY  How much difficulty does the patient currently have. ..   Patient Difficulty: Turning over in bed (including adjusting bedclothes, sheets and blankets)?: A Little   Patient Difficulty: Sitting down on and standing up from a chair with arms (e.g., wheelchair, bedside commode, etc.): A Little   Patient Difficulty: Moving from lying on back to sitting on the side of the bed?: A Little   How much help from another person does the patient currently need. .. Help from Another: Moving to and from a bed to a chair (including a wheelchair)?: A Little   Help from Another: Need to walk in hospital room?: A Little   Help from Another: Climbing 3-5 steps with a railing?: A Little     AM-PAC Score:  Raw Score: 18   Approx Degree of Impairment: 46.58%   Standardized Score (AM-PAC Scale): 43.63   CMS Modifier (G-Code): CK    FUNCTIONAL ABILITY STATUS  Functional Mobility/Gait Assessment  Gait Assistance: Contact guard assist  Distance (ft): 2 x 100  Assistive Device: Rolling walker  Pattern: R Decreased stance time  Stairs: Stairs  How Many Stairs: 4  Device: 1 Rail  Assist: Contact guard assist  Pattern: Ascend and Descend    Additional Information:     Exercises AM Session PM Session   Ankle Pumps 20 reps 20 reps   Quad Sets 20 reps 20 reps   Glut Sets 20 reps 20 reps     Comments: Pt participated in group session, tolerance was     Knee ROM                 Patient End of Session: Up in chair;Call light within reach;RN aware of session/findings;Bracing education provided per handout; All patient questions and concerns addressed    CURRENT GOALS    Patient Goal Patient's self-stated goal is: to go home with home PT   Goal #1 Patient is able to demonstrate supine - sit EOB @ level: modified independent     Goal #1   Current Status Min a   Goal #2 Patient is able to demonstrate transfers Sit to/from Stand at assistance level: modified independent     Min a    Goal #3 Patient is able to ambulate 150 feet with assistive device at assistance level: modified independent    Goal #3   Current Status Pt amb 2 x 100 ft with RW and CGA   Goal #4 Patient will negotiate 4 stairs/one curb w/ assistive device and supervision   Goal #4   Current Status Pt navigated 4 stairs with CGA   Goal #5  AROM 0 degrees extension to 95 degrees flexion     Goal #5   Current Status In progress   Goal #6 Patient independently performs home exercise program for ROM/strengthening per the instructions provided in preparation for discharge.    Goal #6  Current Status In progress

## 2022-05-24 NOTE — PLAN OF CARE
Pt A&O x4. Pt primarily [address] speaking,  used as necessary. Pt on RA. SCD's on board for DVT prophylaxis. Pt voiding via miranda, removed this AM per order. Check void endorsed to dayshift nurse. Pt complained of nausea during the night, PRN reglan given. Emesis x1 occurrence during the night. Accuchecks ACHS. Pain managed with PRN norco and gel ice packs. Up x 1 assist w/ walker. IVF's continued. Ancef given. Hemovac drainage noted see flowsheets. R knee dressing CDI. Call light within reach, bed at lowest position. Son at bedside. Problem: Patient Centered Care  Goal: Patient preferences are identified and integrated in the patient's plan of care  Description: Interventions:  - What would you like us to know as we care for you?  I live with my  son and daughter.  - Provide timely, complete, and accurate information to patient/family  - Incorporate patient and family knowledge, values, beliefs, and cultural backgrounds into the planning and delivery of care  - Encourage patient/family to participate in care and decision-making at the level they choose  - Honor patient and family perspectives and choices  Outcome: Progressing     Problem: Diabetes/Glucose Control  Goal: Glucose maintained within prescribed range  Description: INTERVENTIONS:  - Monitor Blood Glucose as ordered  - Assess for signs and symptoms of hyperglycemia and hypoglycemia  - Administer ordered medications to maintain glucose within target range  - Assess barriers to adequate nutritional intake and initiate nutrition consult as needed  - Instruct patient on self management of diabetes  Outcome: Progressing     Problem: Patient/Family Goals  Goal: Patient/Family Long Term Goal  Description: Patient's Long Term Goal: Return home     Interventions:  -Work with PT/OT  -Absolve N/V  -Pain management   - See additional Care Plan goals for specific interventions  Outcome: Progressing  Goal: Patient/Family Short Term Goal  Description: Patient's Short Term Goal: Pain control     Interventions:   -Pain medications  -Ice therapy   - See additional Care Plan goals for specific interventions  Outcome: Progressing     Problem: PAIN - ADULT  Goal: Verbalizes/displays adequate comfort level or patient's stated pain goal  Description: INTERVENTIONS:  - Encourage pt to monitor pain and request assistance  - Assess pain using appropriate pain scale  - Administer analgesics based on type and severity of pain and evaluate response  - Implement non-pharmacological measures as appropriate and evaluate response  - Consider cultural and social influences on pain and pain management  - Manage/alleviate anxiety  - Utilize distraction and/or relaxation techniques  - Monitor for opioid side effects  - Notify MD/LIP if interventions unsuccessful or patient reports new pain  - Anticipate increased pain with activity and pre-medicate as appropriate  Outcome: Progressing     Problem: RISK FOR INFECTION - ADULT  Goal: Absence of fever/infection during anticipated neutropenic period  Description: INTERVENTIONS  - Monitor WBC  - Administer growth factors as ordered  - Implement neutropenic guidelines  Outcome: Progressing     Problem: SAFETY ADULT - FALL  Goal: Free from fall injury  Description: INTERVENTIONS:  - Assess pt frequently for physical needs  - Identify cognitive and physical deficits and behaviors that affect risk of falls.   - Hawi fall precautions as indicated by assessment.  - Educate pt/family on patient safety including physical limitations  - Instruct pt to call for assistance with activity based on assessment  - Modify environment to reduce risk of injury  - Provide assistive devices as appropriate  - Consider OT/PT consult to assist with strengthening/mobility  - Encourage toileting schedule  Outcome: Progressing     Problem: Altered Communication/Language Barrier  Goal: Patient/Family is able to understand and participate in their care  Description: Interventions:  - Assess communication ability and preferred communication style  - Implement communication aides and strategies  - Use visual cues when possible  - Listen attentively, be patient, do not interrupt  - Minimize distractions  - Allow time for understanding and response  - Establish method for patient to ask for assistance (call light)  - Provide an  as needed  - Communicate barriers and strategies to overcome with those who interact with patient  Outcome: Progressing

## 2022-05-24 NOTE — PROGRESS NOTES
Called patient using #[phone], Richelle Nixon. Patient name and  verified. Patient was informed of Dr. Josiah Kilpatrick lab results. She verbalized understanding. Patient said she had right knee replacement yesterday. Patient taking iron per her PCP.

## 2022-05-24 NOTE — CM/SW NOTE
Department  notified of request for [facility], aidin referrals started. Assigned CM/SW to follow up with pt/family on further discharge planning.      Tevin Tran

## 2022-05-25 ENCOUNTER — TELEPHONE (OUTPATIENT)
Dept: ORTHOPEDICS CLINIC | Facility: CLINIC | Age: 67
End: 2022-05-25

## 2022-05-25 NOTE — TELEPHONE ENCOUNTER
S/w pt son, Carolyn Sumner, and he states when pt woke up this morning she had brown dried blood on her bandages about the size of the palm of his hand. He denies pt having any swelling or increase in pain. He denies any wet fresh blood or increase in size since pt first noticed this morning. Pt was discharged on 5/24 and has referral submitted for [facility], but pt has not rc'd a call yet from [facility]. I advised pt son that some bleeding on the bandages can be normal after sx, but if bleeding starts up again they should CB to office. Pt son wants to know if they should change the dressing. Discharge instructions do not specify when dressing should be changed.

## 2022-05-25 NOTE — TELEPHONE ENCOUNTER
S/w Judge Calixto at  and she states pt will be receiving HHC from [facility], contact phone # [phone]. She will reach out to them to let know pt has been discharged and to have start of care. S/w Byron and informed him that [facility] will be reaching out to set up appts with RN an PT. Also ok for pt to change dressing today, he states the hospital did not send any dressings home with pt. Per  ok for pt to apply Gauze and tape over incision. Informed Byron of this and can obtain at [facility]. Also advised he can wait and see if [facility] calls today to set up appt by tomorrow and RN may have supplies to help change dressing. He states pt is doing well, had a couple more drops of bleeding ealier but has since stopped.

## 2022-05-25 NOTE — TELEPHONE ENCOUNTER
Instructions specified change dressing 4 days after discharge. Ok to change dressing now. Agree with plan.

## 2022-05-25 NOTE — CM/SW NOTE
The pt. Was discharged home 5/24. [facility] is able to accept. [facility] has been reserved in [facility] and  requested they reach out to the pt. Regarding start of care. Johnny [address] orders and face to face sent in [facility].      Angelina Faustino [phone] [facility]

## 2022-05-26 NOTE — TELEPHONE ENCOUNTER
Left message for carlos enrique Pope to send home health orders relating to knee replacement. Encouraged to call with questions or concerns.

## 2022-06-04 ENCOUNTER — OFFICE VISIT (OUTPATIENT)
Dept: RHEUMATOLOGY | Facility: CLINIC | Age: 67
End: 2022-06-04
Payer: MEDICAID

## 2022-06-04 VITALS
SYSTOLIC BLOOD PRESSURE: 133 MMHG | WEIGHT: 156 LBS | HEART RATE: 79 BPM | RESPIRATION RATE: 16 BRPM | DIASTOLIC BLOOD PRESSURE: 81 MMHG | BODY MASS INDEX: 27.64 KG/M2 | HEIGHT: 63 IN

## 2022-06-04 DIAGNOSIS — Z96.651 STATUS POST RIGHT KNEE REPLACEMENT: Primary | ICD-10-CM

## 2022-06-04 DIAGNOSIS — M05.9 SEROPOSITIVE RHEUMATOID ARTHRITIS (HCC): ICD-10-CM

## 2022-06-04 DIAGNOSIS — Z51.81 THERAPEUTIC DRUG MONITORING: ICD-10-CM

## 2022-06-04 DIAGNOSIS — M25.562 CHRONIC PAIN OF BOTH KNEES: ICD-10-CM

## 2022-06-04 DIAGNOSIS — M25.561 CHRONIC PAIN OF BOTH KNEES: ICD-10-CM

## 2022-06-04 DIAGNOSIS — R76.12 POSITIVE QUANTIFERON-TB GOLD TEST: ICD-10-CM

## 2022-06-04 DIAGNOSIS — G89.29 CHRONIC PAIN OF BOTH KNEES: ICD-10-CM

## 2022-06-04 PROCEDURE — 3075F SYST BP GE 130 - 139MM HG: CPT | Performed by: INTERNAL MEDICINE

## 2022-06-04 PROCEDURE — 3079F DIAST BP 80-89 MM HG: CPT | Performed by: INTERNAL MEDICINE

## 2022-06-04 PROCEDURE — 3008F BODY MASS INDEX DOCD: CPT | Performed by: INTERNAL MEDICINE

## 2022-06-04 PROCEDURE — 99214 OFFICE O/P EST MOD 30 MIN: CPT | Performed by: INTERNAL MEDICINE

## 2022-06-04 RX ORDER — FOLIC ACID 1 MG/1
1 TABLET ORAL DAILY
Qty: 90 TABLET | Refills: 1 | Status: SHIPPED | OUTPATIENT
Start: 2022-06-04

## 2022-06-04 NOTE — PATIENT INSTRUCTIONS
You were seen today for rheumatoid arthritis  Joints are controlled  Continue methotrexate 6 pills weekly and Humira every 2 weeks  Blood work in 3 months August  See me in the next 4 to 5 months

## 2022-06-07 ENCOUNTER — APPOINTMENT (OUTPATIENT)
Dept: PHYSICAL THERAPY | Age: 67
End: 2022-06-07
Attending: INTERNAL MEDICINE
Payer: MEDICAID

## 2022-06-08 ENCOUNTER — HOSPITAL ENCOUNTER (OUTPATIENT)
Dept: GENERAL RADIOLOGY | Facility: HOSPITAL | Age: 67
Discharge: HOME | End: 2022-06-08
Attending: ORTHOPAEDIC SURGERY
Payer: MEDICAID

## 2022-06-08 ENCOUNTER — OFFICE VISIT (OUTPATIENT)
Dept: ORTHOPEDICS CLINIC | Facility: CLINIC | Age: 67
End: 2022-06-08
Payer: MEDICAID

## 2022-06-08 ENCOUNTER — APPOINTMENT (OUTPATIENT)
Dept: PHYSICAL THERAPY | Age: 67
End: 2022-06-08
Attending: INTERNAL MEDICINE
Payer: MEDICAID

## 2022-06-08 DIAGNOSIS — Z47.89 ORTHOPEDIC AFTERCARE: ICD-10-CM

## 2022-06-08 DIAGNOSIS — M06.9 RHEUMATOID ARTHRITIS INVOLVING BOTH KNEES, UNSPECIFIED WHETHER RHEUMATOID FACTOR PRESENT (HCC): Primary | ICD-10-CM

## 2022-06-08 PROCEDURE — 99024 POSTOP FOLLOW-UP VISIT: CPT | Performed by: ORTHOPAEDIC SURGERY

## 2022-06-08 PROCEDURE — 73564 X-RAY EXAM KNEE 4 OR MORE: CPT | Performed by: ORTHOPAEDIC SURGERY

## 2022-06-08 PROCEDURE — 1111F DSCHRG MED/CURRENT MED MERGE: CPT | Performed by: ORTHOPAEDIC SURGERY

## 2022-06-13 ENCOUNTER — TELEPHONE (OUTPATIENT)
Dept: PHYSICAL THERAPY | Facility: HOSPITAL | Age: 67
End: 2022-06-13

## 2022-06-13 ENCOUNTER — OFFICE VISIT (OUTPATIENT)
Dept: PHYSICAL THERAPY | Age: 67
End: 2022-06-13
Attending: INTERNAL MEDICINE
Payer: MEDICAID

## 2022-06-13 PROCEDURE — 97161 PT EVAL LOW COMPLEX 20 MIN: CPT | Performed by: PHYSICAL THERAPIST

## 2022-06-13 PROCEDURE — 97110 THERAPEUTIC EXERCISES: CPT | Performed by: PHYSICAL THERAPIST

## 2022-06-15 ENCOUNTER — OFFICE VISIT (OUTPATIENT)
Dept: PHYSICAL THERAPY | Age: 67
End: 2022-06-15
Attending: INTERNAL MEDICINE
Payer: MEDICAID

## 2022-06-15 PROCEDURE — 97110 THERAPEUTIC EXERCISES: CPT | Performed by: PHYSICAL THERAPIST

## 2022-06-15 PROCEDURE — 97116 GAIT TRAINING THERAPY: CPT | Performed by: PHYSICAL THERAPIST

## 2022-06-15 NOTE — PROGRESS NOTES
Diagnosis: Rheumatoid arthritis involving both knees, unspecified whether rheumatoid factor present (HCC) (M06.9)  Primary osteoarthritis of right knee [M17.11]  Right total knee arthroplasty        Next MD visit: none scheduled  Fall Risk: standard         Precautions: n/a          Medication Changes since last visit?: No      Subjective: Patient states her knee is feeling good. Pt describes pain level 1/10 pain. Objective:  R knee ROM   Gait: walks independently with a cane, with decreased stance and push off on R LE. Assessment: Demonstrates improved LE mobility after therapeutic exercises. Still with considerable R LE limping. Patient and PT goals are in progress.       Plan: Continue PT to improve mobility     6/15/2022  Visit#: 2/ Medicaid   Thera Ex   x45min  - seated R LE extensions 10 x 5 sec hold  - calf stretch on slant board 30 sec x 3  - B heel raises on slant board 20x  - runners stretch on stairs 10 x 5 sec hold  - Shuttle B LE extension 10 x 3 with 4 bands resistance  - Shuttle single LE extensions 10 x 2 with 4 bands  - Nustep L5 x 10min with cueing to improve LE extension       Gait training   x10min  - walking on level surfaces with cueing to improve R knee flexion and R LE stance  - gait training on stairs alternating steps                     Charges: EX3, Gait training1       Total Timed Treatment: 55 min  Total Treatment Time: 55 min

## 2022-06-20 ENCOUNTER — OFFICE VISIT (OUTPATIENT)
Dept: PHYSICAL THERAPY | Age: 67
End: 2022-06-20
Attending: INTERNAL MEDICINE
Payer: MEDICAID

## 2022-06-20 PROCEDURE — 97110 THERAPEUTIC EXERCISES: CPT | Performed by: PHYSICAL THERAPIST

## 2022-06-20 PROCEDURE — 97140 MANUAL THERAPY 1/> REGIONS: CPT | Performed by: PHYSICAL THERAPIST

## 2022-06-20 NOTE — PROGRESS NOTES
Diagnosis: Rheumatoid arthritis involving both knees, unspecified whether rheumatoid factor present (HCC) (M06.9)  Primary osteoarthritis of right knee [M17.11]  Right total knee arthroplasty        Next MD visit: none scheduled  Fall Risk: standard         Precautions: n/a          Medication Changes since last visit?: No      Subjective: Patient reports ERP with stretching but otherwise feels good  Pt describes pain level 1/10 pain. Objective:  R knee AROM ; passively to -5  Gait: walks independently with a cane, needs cueing for heel-toe pattern    Assessment: Needs work on regaining full knee extension to normalize LE biomechanics in [facility]. Plan: Check response in increased frequency of extension stretching. 6/15/2022  Visit#: 2/ Medicaid 6/20/2022  Visit#: 3/ Medicaid   Thera Ex   x45min  - seated R LE extensions 10 x 5 sec hold  - calf stretch on slant board 30 sec x 3  - B heel raises on slant board 20x  - runners stretch on stairs 10 x 5 sec hold  - Shuttle B LE extension 10 x 3 with 4 bands resistance  - Shuttle single LE extensions 10 x 2 with 4 bands  - Nustep L5 x 10min with cueing to improve LE extension     (45 mins)  - slantboard stretch 3 x 1 min ea L4  - NuStep LE only L6 x 10 mins with focus on end-range extension  - [facility] TKE's and reverse TKE's red tband 3x10 with 5 sec hold  - Shuttle (B) 6 band 3x10 5 sec hold with focus on controlling valgus collapse  - Shuttle (R) only 3 band 3x10 with 5 sec hold  - (R) Knee PROM x 10 mins with focus on end-range extension stretch.    Gait training   x10min  - walking on level surfaces with cueing to improve R knee flexion and R LE stance  - gait training on stairs alternating steps      Manual Therapy    (8 mins)  (R) PF mobs all planes  (R) tibfem rotation for end-range extension/flexion with PROM  Scar mobs          HEP: Instructed on self scar mobs/patellar mobs and frequent stretching into knee extension (every 2 hours)    Charges: EX3, Manual Tx x 1       Total Timed Treatment: 53 min  Total Treatment Time: 53 min

## 2022-06-22 ENCOUNTER — OFFICE VISIT (OUTPATIENT)
Dept: PHYSICAL THERAPY | Age: 67
End: 2022-06-22
Attending: INTERNAL MEDICINE
Payer: MEDICAID

## 2022-06-22 PROCEDURE — 97110 THERAPEUTIC EXERCISES: CPT | Performed by: PHYSICAL THERAPIST

## 2022-06-22 PROCEDURE — 97140 MANUAL THERAPY 1/> REGIONS: CPT | Performed by: PHYSICAL THERAPIST

## 2022-06-22 NOTE — PROGRESS NOTES
Diagnosis: Rheumatoid arthritis involving both knees, unspecified whether rheumatoid factor present (HCC) (M06.9)  Primary osteoarthritis of right knee [M17.11]  Right total knee arthroplasty        Next MD visit: none scheduled  Fall Risk: standard         Precautions: n/a          Medication Changes since last visit?: No  Pain ratin/10    Subjective: No new c/o; mostly c/o ERP with (R) knee stretching      Objective:  R knee AROM 0 - 5 - 115; passively to 0 - 0 - 120  Decreased (R) ground clearance with guarding during gait; not evident with retrowalk    Assessment: Needs work on addressing compensatory mechanisms during gait to promote use of available knee ROM. Plan: Continue to address full knee extension. 6/15/2022  Visit#: 2/ Medicaid 2022  Visit#: 3/ Medicaid   Thera Ex   x45min  - seated R LE extensions 10 x 5 sec hold  - calf stretch on slant board 30 sec x 3  - B heel raises on slant board 20x  - runners stretch on stairs 10 x 5 sec hold  - Shuttle B LE extension 10 x 3 with 4 bands resistance  - Shuttle single LE extensions 10 x 2 with 4 bands  - Nustep L5 x 10min with cueing to improve LE extension     (45 mins)  - slantboard stretch 3 x 1 min ea L4  - NuStep LE only L6 x 10 mins with focus on end-range extension  - CKC TKE's and reverse TKE's red tband 3x10 with 5 sec hold  - Shuttle (B) 6 band 3x10 5 sec hold with focus on controlling valgus collapse  - Shuttle (R) only 3 band 3x10 with 5 sec hold  - (R) Knee PROM x 10 mins with focus on end-range extension stretch.    Gait training   x10min  - walking on level surfaces with cueing to improve R knee flexion and R LE stance  - gait training on stairs alternating steps      Manual Therapy    (8 mins)  (R) PF mobs all planes  (R) tibfem rotation for end-range extension/flexion with PROM  Scar mobs                 Date: 2022  TX#: 2/MEDICAID Date:                 TX#: 3/ Date:                 TX#: 4/ Date:                 TX#: 5/ Date:   Tx#: 6/   THERAPEUTIC EX 45 mins       slantboard stretch L5 3 x 1 min; (R) only on last rep       TM retrowalk 12% grade 0.8mph x 10 mins focus on (R) knee flexion/ext       (R) knee PROM 10 mins        SB bridging GSB 3x10       SB hams curls GSB 3x10       (R) SLR 3x10       Prone (R) TKE 3x10       Glut raises 3x10       Prone knee flexion 3x10       TKE Green tband (R) 3x10       Reverse TKE Green tband (R) 3x10       NuStep LE only full ext L7 x 10 mins                               MANUAL TX 8 mins       (R) patellofemoral/tibfemoral 5 mins all planes       Scar mobs (R) Knee 3 mins       HEP: Refer to patient instructions.     Charges: EX3, Manual Tx x 1       Total Timed Treatment: 53 min  Total Treatment Time: 53 min

## 2022-06-27 ENCOUNTER — TELEPHONE (OUTPATIENT)
Dept: PHYSICAL THERAPY | Age: 67
End: 2022-06-27

## 2022-06-28 ENCOUNTER — OFFICE VISIT (OUTPATIENT)
Dept: PHYSICAL THERAPY | Age: 67
End: 2022-06-28
Attending: INTERNAL MEDICINE
Payer: MEDICAID

## 2022-06-28 PROCEDURE — 97110 THERAPEUTIC EXERCISES: CPT | Performed by: PHYSICAL THERAPIST

## 2022-06-28 PROCEDURE — 97140 MANUAL THERAPY 1/> REGIONS: CPT | Performed by: PHYSICAL THERAPIST

## 2022-06-28 NOTE — PROGRESS NOTES
Diagnosis: Rheumatoid arthritis involving both knees, unspecified whether rheumatoid factor present (HCC) (M06.9)  Primary osteoarthritis of right knee [M17.11]  Right total knee arthroplasty        Next MD visit: none scheduled  Fall Risk: standard         Precautions: n/a          Medication Changes since last visit?: No  Pain ratin.5/10    Subjective: symptoms mostly over anterolateral (R) knee      Objective:  R knee AROM 0 - 5 - 117; passively to 0 - 0 - 120    Assessment: Tolerating progression to CKC strengthening without signs of increased tissue irritability. Plan: Check for latent tissue irritation with CKC progression. 6/15/2022  Visit#: 2/ Medicaid 2022  Visit#: 3/ Medicaid   Thera Ex   x45min  - seated R LE extensions 10 x 5 sec hold  - calf stretch on slant board 30 sec x 3  - B heel raises on slant board 20x  - runners stretch on stairs 10 x 5 sec hold  - Shuttle B LE extension 10 x 3 with 4 bands resistance  - Shuttle single LE extensions 10 x 2 with 4 bands  - Nustep L5 x 10min with cueing to improve LE extension     (45 mins)  - slantboard stretch 3 x 1 min ea L4  - NuStep LE only L6 x 10 mins with focus on end-range extension  - CKC TKE's and reverse TKE's red tband 3x10 with 5 sec hold  - Shuttle (B) 6 band 3x10 5 sec hold with focus on controlling valgus collapse  - Shuttle (R) only 3 band 3x10 with 5 sec hold  - (R) Knee PROM x 10 mins with focus on end-range extension stretch. Gait training   x10min  - walking on level surfaces with cueing to improve R knee flexion and R LE stance  - gait training on stairs alternating steps      Manual Therapy    (8 mins)  (R) PF mobs all planes  (R) tibfem rotation for end-range extension/flexion with PROM  Scar mobs                 Date: 2022  TX#: 4/MEDICAID Date:2022                 TX#: 5/MEDICAID Date:                 TX#: 6/ Date:                 TX#:7/ Date:    Tx#: 8/   THERAPEUTIC EX 45 mins 45 mins      slantboard stretch L5 3 x 1 min; (R) only on last rep L5 3 x 1 min; (R) only on last rep      TM retrowalk 12% grade 0.8mph x 10 mins focus on (R) knee flexion/ext 12% grade 1mph x 10 mins focus on (R) knee flexion/ext      (R) knee PROM 10 mins  5 mins      SB bridging GSB 3x10 GSB 3x10      SB hams curls GSB 3x10 GSB 3x10      (R) SLR 3x10       Prone (R) TKE 3x10       Glut raises 3x10       Prone knee flexion 3x10       TKE Green tband (R) 3x10       Reverse TKE Green tband (R) 3x10       NuStep LE only full ext L7 x 10 mins L7 x 10 mins      Step up  6\" 3x10      Lateral step down  6\" to airex pad 3x10      Stool scoots  2 x 20 ft fwd/retro      3 way hip  airex 3x10 (R) stance on airex                                      MANUAL TX 8 mins 8 mins      (R) patellofemoral/tibfemoral 5 mins all planes 5 mins all planes      Scar mobs (R) Knee 3 mins 3 mins (R) knee      HEP: Continue current HEP.     Charges: EX3, Manual Tx x 1       Total Timed Treatment: 53 min  Total Treatment Time: 53 min

## 2022-06-29 ENCOUNTER — OFFICE VISIT (OUTPATIENT)
Dept: PHYSICAL THERAPY | Age: 67
End: 2022-06-29
Attending: INTERNAL MEDICINE
Payer: MEDICAID

## 2022-06-29 ENCOUNTER — APPOINTMENT (OUTPATIENT)
Dept: PHYSICAL THERAPY | Age: 67
End: 2022-06-29
Attending: INTERNAL MEDICINE
Payer: MEDICAID

## 2022-06-29 PROCEDURE — 97110 THERAPEUTIC EXERCISES: CPT | Performed by: PHYSICAL THERAPIST

## 2022-06-29 PROCEDURE — 97140 MANUAL THERAPY 1/> REGIONS: CPT | Performed by: PHYSICAL THERAPIST

## 2022-06-29 NOTE — PROGRESS NOTES
Diagnosis: Rheumatoid arthritis involving both knees, unspecified whether rheumatoid factor present (HCC) (M06.9)  Primary osteoarthritis of right knee [M17.11]  Right total knee arthroplasty        Next MD visit: none scheduled  Fall Risk: standard         Precautions: n/a          Medication Changes since last visit?: No  Pain ratin/10    Subjective: Only c/o       Objective:  (R) knee ext lag when fatigue sets in using ankle weights    Assessment: Needs work on TKE strength to preserve full knee ext      Plan: Continue to progress CK strengthening. 6/15/2022  Visit#: 2/ Medicaid 2022  Visit#: 3/ Medicaid   Thera Ex   x45min  - seated R LE extensions 10 x 5 sec hold  - calf stretch on slant board 30 sec x 3  - B heel raises on slant board 20x  - runners stretch on stairs 10 x 5 sec hold  - Shuttle B LE extension 10 x 3 with 4 bands resistance  - Shuttle single LE extensions 10 x 2 with 4 bands  - Nustep L5 x 10min with cueing to improve LE extension     (45 mins)  - slantboard stretch 3 x 1 min ea L4  - NuStep LE only L6 x 10 mins with focus on end-range extension  - CKC TKE's and reverse TKE's red tband 3x10 with 5 sec hold  - Shuttle (B) 6 band 3x10 5 sec hold with focus on controlling valgus collapse  - Shuttle (R) only 3 band 3x10 with 5 sec hold  - (R) Knee PROM x 10 mins with focus on end-range extension stretch. Gait training   x10min  - walking on level surfaces with cueing to improve R knee flexion and R LE stance  - gait training on stairs alternating steps      Manual Therapy    (8 mins)  (R) PF mobs all planes  (R) tibfem rotation for end-range extension/flexion with PROM  Scar mobs                 Date: 2022  TX#: 4/MEDICAID Date:2022                 TX#: 5/MEDICAID Date:2022                 TX#: 6/MEDICAID Date:                 TX#:7/ Date:    Tx#: 8/   THERAPEUTIC EX 45 mins 45 mins 45 mins     slantboard stretch L5 3 x 1 min; (R) only on last rep L5 3 x 1 min; (R) only on last rep L5 3 x 1 min; (R) only on last rep     TM retrowalk 12% grade 0.8mph x 10 mins focus on (R) knee flexion/ext 12% grade 1mph x 10 mins focus on (R) knee flexion/ext 12% grade 1mph x 10 mins focus on (R) knee flexion/ext     (R) knee PROM 10 mins  5 mins 5 mins     SB bridging GSB 3x10 GSB 3x10 GSB 3x10     SB hams curls GSB 3x10 GSB 3x10 GSB 3x10     (R) SLR 3x10  3x10 1.5lb     Prone (R) TKE 3x10  3x10 1.5lb     Glut raises 3x10  3x10 1.5lb     Prone knee flexion 3x10  3x10 1.5lb     TKE Green tband (R) 3x10       Reverse TKE Green tband (R) 3x10       NuStep LE only full ext L7 x 10 mins L7 x 10 mins L7 x 10 mins     Step up  6\" 3x10      Lateral step down  6\" to airex pad 3x10      Stool scoots  2 x 20 ft fwd/retro      3 way hip  airex 3x10 (R) stance on airex      SL hip add   3x10 1.5lb                             MANUAL TX 8 mins 8 mins 8 mins     (R) patellofemoral/tibfemoral 5 mins all planes 5 mins all planes 5 mins all planes     Scar mobs (R) Knee 3 mins 3 mins (R) knee 3 mins (R) knee     HEP: Continue current HEP.     Charges: EX3, Manual Tx x 1       Total Timed Treatment: 53 min  Total Treatment Time: 53 min

## 2022-07-05 ENCOUNTER — OFFICE VISIT (OUTPATIENT)
Dept: PHYSICAL THERAPY | Age: 67
End: 2022-07-05
Attending: INTERNAL MEDICINE
Payer: MEDICAID

## 2022-07-05 PROCEDURE — 97140 MANUAL THERAPY 1/> REGIONS: CPT | Performed by: PHYSICAL THERAPIST

## 2022-07-05 PROCEDURE — 97110 THERAPEUTIC EXERCISES: CPT | Performed by: PHYSICAL THERAPIST

## 2022-07-05 NOTE — PROGRESS NOTES
Diagnosis: Rheumatoid arthritis involving both knees, unspecified whether rheumatoid factor present (HCC) (M06.9)  Primary osteoarthritis of right knee [M17.11]  Right total knee arthroplasty        Next MD visit: none scheduled  Fall Risk: standard         Precautions: n/a          Medication Changes since last visit?: No  Pain ratin/10    Subjective: Only c/o pain with stretching. Objective:  Knee extension deficit still at -5 but passively able to reach 0    Assessment: Needs work on regaining full active knee extension      Plan: Continue to encourage knee extension stretching in HEP. Date: 2022  TX#: 4/MEDICAID Date:2022                 TX#: 5/MEDICAID Date:2022                 TX#: 6/MEDICAID STBD:3/4/1845                TX#:7/ MEDICAID Date:    Tx#: 8/   THERAPEUTIC EX 45 mins 45 mins 45 mins 45 mins    slantboard stretch L5 3 x 1 min; (R) only on last rep L5 3 x 1 min; (R) only on last rep L5 3 x 1 min; (R) only on last rep L5 3 x 1 min; (R) only on last rep    TM retrowalk 12% grade 0.8mph x 10 mins focus on (R) knee flexion/ext 12% grade 1mph x 10 mins focus on (R) knee flexion/ext 12% grade 1mph x 10 mins focus on (R) knee flexion/ext 12% grade 1mph x 10 mins focus on (R) knee flexion/ext    (R) knee PROM 10 mins  5 mins 5 mins 10 mins focus on ext    SB bridging GSB 3x10 GSB 3x10 GSB 3x10     SB hams curls GSB 3x10 GSB 3x10 GSB 3x10     (R) SLR 3x10  3x10 1.5lb     Prone (R) TKE 3x10  3x10 1.5lb     Glut raises 3x10  3x10 1.5lb     Prone knee flexion 3x10  3x10 1.5lb     TKE Green tband (R) 3x10   Green tband 3x10    Reverse TKE Green tband (R) 3x10   Green tband 3x10    NuStep LE only full ext L7 x 10 mins L7 x 10 mins L7 x 10 mins L7 x 10 mins    Step up  6\" 3x10      Lateral step down  6\" to airex pad 3x10      Stool scoots  2 x 20 ft fwd/retro      3 way hip  airex 3x10 (R) stance on airex  BOSU 3x10    SL hip add   3x10 1.5lb     Lateral walking    10 x 10 ft red tband Fwd/retro monster walk    10 x 10 ft red tband            MANUAL TX 8 mins 8 mins 8 mins 8 mins    (R) patellofemoral/tibfemoral 5 mins all planes 5 mins all planes 5 mins all planes 5 mins all planes    Scar mobs (R) Knee 3 mins 3 mins (R) knee 3 mins (R) knee 3 mins (R) knee    HEP: Reinforced knee extension stretching.     Charges: EX3, Manual Tx x 1       Total Timed Treatment: 53 min  Total Treatment Time: 53 min

## 2022-07-07 ENCOUNTER — OFFICE VISIT (OUTPATIENT)
Dept: PHYSICAL THERAPY | Age: 67
End: 2022-07-07
Attending: INTERNAL MEDICINE
Payer: MEDICAID

## 2022-07-07 PROCEDURE — 97140 MANUAL THERAPY 1/> REGIONS: CPT | Performed by: PHYSICAL THERAPIST

## 2022-07-07 PROCEDURE — 97110 THERAPEUTIC EXERCISES: CPT | Performed by: PHYSICAL THERAPIST

## 2022-07-07 NOTE — PROGRESS NOTES
Diagnosis: Rheumatoid arthritis involving both knees, unspecified whether rheumatoid factor present (HCC) (M06.9)  Primary osteoarthritis of right knee [M17.11]  Right total knee arthroplasty        Next MD visit: none scheduled  Fall Risk: standard         Precautions: n/a          Medication Changes since last visit?: No  Pain ratin/10    Subjective: still c/o severe pain with stretching but resolves quickly. Objective:  (R) knee AROM 0 - 5 - 117    Assessment: Needs work on TKE to allow return to full knee extension and symmetrical gait. Plan: Continue to encourage knee extension stretching in HEP.    Date: 2022  TX#: 4/MEDICAID Date:2022                 TX#: 5/MEDICAID Date:2022                 TX#: 6/MEDICAID Date:2022                TX#:7/ MEDICAID Date:2022   Tx#: 8/MEDICAID   THERAPEUTIC EX 45 mins 45 mins 45 mins 45 mins 45 MINS   slantboard stretch L5 3 x 1 min; (R) only on last rep L5 3 x 1 min; (R) only on last rep L5 3 x 1 min; (R) only on last rep L5 3 x 1 min; (R) only on last rep L5 3 x 1 min; (R) only on last rep   TM retrowalk 12% grade 0.8mph x 10 mins focus on (R) knee flexion/ext 12% grade 1mph x 10 mins focus on (R) knee flexion/ext 12% grade 1mph x 10 mins focus on (R) knee flexion/ext 12% grade 1mph x 10 mins focus on (R) knee flexion/ext 12% grade 1mph x 10 mins focus on (R) knee flexion/ext   (R) knee PROM 10 mins  5 mins 5 mins 10 mins focus on ext 10 mins focus on ext   SB bridging GSB 3x10 GSB 3x10 GSB 3x10     SB hams curls GSB 3x10 GSB 3x10 GSB 3x10     (R) SLR 3x10  3x10 1.5lb     Prone (R) TKE 3x10  3x10 1.5lb     Glut raises 3x10  3x10 1.5lb     Prone knee flexion 3x10  3x10 1.5lb     TKE Green tband (R) 3x10   Green tband 3x10 Blue tband 3x10   Reverse TKE Green tband (R) 3x10   Green tband 3x10 Blue tband 3x10   NuStep LE only full ext L7 x 10 mins L7 x 10 mins L7 x 10 mins L7 x 10 mins L8 x 10 mins   Step up  6\" 3x10      Lateral step down 6\" to airex pad 3x10   6\" 3x10   Stool scoots  2 x 20 ft fwd/retro      3 way hip  airex 3x10 (R) stance on airex  BOSU 3x10 BOSU 3x10   SL hip add   3x10 1.5lb     Lateral walking    10 x 10 ft red tband    Fwd/retro monster walk    10 x 10 ft red tband    Fwd step down     3x10 6\"   Shuttle (R)     3 band 3x15   Shuttle (B)     6 band 3x10                           MANUAL TX 8 mins 8 mins 8 mins 8 mins 8 mins   (R) patellofemoral/tibfemoral 5 mins all planes 5 mins all planes 5 mins all planes 5 mins all planes 5 mins all planes   Scar mobs (R) Knee 3 mins 3 mins (R) knee 3 mins (R) knee 3 mins (R) knee 3 mins (R) knee   HEP: Continue current HEP.     Charges: EX3, Manual Tx x 1       Total Timed Treatment: 53 min  Total Treatment Time: 53 min

## 2022-07-15 ENCOUNTER — OFFICE VISIT (OUTPATIENT)
Dept: ORTHOPEDICS CLINIC | Facility: CLINIC | Age: 67
End: 2022-07-15
Payer: MEDICAID

## 2022-07-15 ENCOUNTER — HOSPITAL ENCOUNTER (OUTPATIENT)
Dept: GENERAL RADIOLOGY | Facility: HOSPITAL | Age: 67
Discharge: HOME | End: 2022-07-15
Attending: ORTHOPAEDIC SURGERY
Payer: MEDICAID

## 2022-07-15 DIAGNOSIS — Z47.89 ORTHOPEDIC AFTERCARE: ICD-10-CM

## 2022-07-15 DIAGNOSIS — M06.9 RHEUMATOID ARTHRITIS INVOLVING BOTH KNEES, UNSPECIFIED WHETHER RHEUMATOID FACTOR PRESENT (HCC): Primary | ICD-10-CM

## 2022-07-15 PROCEDURE — 73562 X-RAY EXAM OF KNEE 3: CPT | Performed by: ORTHOPAEDIC SURGERY

## 2022-07-15 PROCEDURE — 99024 POSTOP FOLLOW-UP VISIT: CPT | Performed by: ORTHOPAEDIC SURGERY

## 2022-07-18 ENCOUNTER — OFFICE VISIT (OUTPATIENT)
Dept: PHYSICAL THERAPY | Age: 67
End: 2022-07-18
Attending: INTERNAL MEDICINE
Payer: MEDICAID

## 2022-07-18 PROCEDURE — 97140 MANUAL THERAPY 1/> REGIONS: CPT | Performed by: PHYSICAL THERAPIST

## 2022-07-18 PROCEDURE — 97110 THERAPEUTIC EXERCISES: CPT | Performed by: PHYSICAL THERAPIST

## 2022-07-18 NOTE — PROGRESS NOTES
ProgressSummary  Pt has attended 8 visits in Physical Therapy. Diagnosis: Rheumatoid arthritis involving both knees, unspecified whether rheumatoid factor present ([facility]) (M06.9)  Primary osteoarthritis of right knee [M17.11]  Right total knee arthroplasty        Next MD visit: none scheduled  Fall Risk: standard         Precautions: n/a          Medication Changes since last visit?: No  Pain ratin/10  Assessment:   Deandra Carlos has responded well to her program with steady improvement in her knee AROM allowing improved gait symmetry and functional ADL's below waist level. Significant ERP and weakness into end-range along with decreased glut activation continues to limit high level gait and balance/proprioceptive deficits. Progress slowed by decreased ability to follow instructions for complex movement patterns. She will benefit from continuing with her program to further address these deficits and progress to (I) gait at community level without device. Subjective: ERP with stretching into flexion and extension resolves quickly at rest.      Objective:  (R) knee AROM 0 - 5 - 120  Decreased ability to perform reciprocal UE/LE movement patterns due to poor glut activation.     Treatment:   Date: 2022  TX#: 4/MEDICAID Date:2022                 TX#: 5/MEDICAID Date:2022                 TX#: 6/MEDICAID Date:2022                TX#:7/ MEDICAID Date:2022   Tx#: 8/MEDICAID Date:2022   Tx#: 9/MEDICAID      THERAPEUTIC EX 45 mins 45 mins 45 mins 45 mins 45 MINS       slantboard stretch L5 3 x 1 min; (R) only on last rep L5 3 x 1 min; (R) only on last rep L5 3 x 1 min; (R) only on last rep L5 3 x 1 min; (R) only on last rep L5 3 x 1 min; (R) only on last rep L5 (R) only 2x1 min      TM retrowalk 12% grade 0.8mph x 10 mins focus on (R) knee flexion/ext 12% grade 1mph x 10 mins focus on (R) knee flexion/ext 12% grade 1mph x 10 mins focus on (R) knee flexion/ext 12% grade 1mph x 10 mins focus on (R) knee flexion/ext 12% grade 1mph x 10 mins focus on (R) knee flexion/ext 12% grade 1. 2mph x 10 mins focus on (R) knee flexion/ext      (R) knee PROM 10 mins  5 mins 5 mins 10 mins focus on ext 10 mins focus on ext 10 mins focus on ext      SB bridging GSB 3x10 GSB 3x10 GSB 3x10   GSB 3x10      SB hams curls GSB 3x10 GSB 3x10 GSB 3x10   GSB 3x10      (R) SLR 3x10  3x10 1.5lb         Prone (R) TKE 3x10  3x10 1.5lb         Glut raises 3x10  3x10 1.5lb         Prone knee flexion 3x10  3x10 1.5lb         TKE Green tband (R) 3x10   Green tband 3x10 Blue tband 3x10       Reverse TKE Green tband (R) 3x10   Green tband 3x10 Blue tband 3x10       NuStep LE only full ext L7 x 10 mins L7 x 10 mins L7 x 10 mins L7 x 10 mins L8 x 10 mins L8 x 10 mins      Step up  6\" 3x10    BOSU 3x10 with march      Lateral step down  6\" to airex pad 3x10   6\" 3x10       Stool scoots  2 x 20 ft fwd/retro          3 way hip  airex 3x10 (R) stance on airex  BOSU 3x10 BOSU 3x10 BOSU yellow tband 3x10      SL hip add   3x10 1.5lb         Lateral walking    10 x 10 ft red tband        Fwd/retro monster walk    10 x 10 ft red tband        Fwd step down     3x10 6\" 3x10 on stair      Shuttle (R)     3 band 3x15 4 band 3x15      Shuttle (B)     6 band 3x10       Standing UE WB on wall alt/opp UE/LE lifts      3x10 with 3 sec hold      Lateral step up with dips      BOSU 3x10                  MANUAL TX 8 mins 8 mins 8 mins 8 mins 8 mins 8 mins      (R) patellofemoral/tibfemoral 5 mins all planes 5 mins all planes 5 mins all planes 5 mins all planes 5 mins all planes 5 mins all planes      Scar mobs (R) Knee 3 mins 3 mins (R) knee 3 mins (R) knee 3 mins (R) knee 3 mins (R) knee 3 mins (R) knee      HEP: Continue current HEP.     Charges: EX3, Manual Tx x 1       Total Timed Treatment: 53 min  Total Treatment Time: 53 min    FOTO: unable to complete/refused    Plan: Continue skilled Physical Therapy 2 x/week or a total of 18 visits over a 90 day period as established in her initial POC. Zeus Omid was advised of these findings, precautions, and treatment options and has agreed to actively participate in planning and for this course of care. Thank you for your referral. If you have any questions, please contact me at Dept: [phone]. Sincerely,  Electronically signed by therapist: Ita Canales PT     Physician's certification required:  Yes  Please co-sign or sign and return this letter via fax as soon as possible to [phone]. I certify the need for these services furnished under this plan of treatment and while under my care.     X___________________________________________________ Date____________________    Certification From: 4/02/4283  To:10/16/2022

## 2022-07-19 ENCOUNTER — OFFICE VISIT (OUTPATIENT)
Dept: PHYSICAL THERAPY | Age: 67
End: 2022-07-19
Attending: INTERNAL MEDICINE
Payer: MEDICAID

## 2022-07-19 PROCEDURE — 97140 MANUAL THERAPY 1/> REGIONS: CPT | Performed by: PHYSICAL THERAPIST

## 2022-07-19 PROCEDURE — 97110 THERAPEUTIC EXERCISES: CPT | Performed by: PHYSICAL THERAPIST

## 2022-07-19 NOTE — PROGRESS NOTES
Diagnosis: Rheumatoid arthritis involving both knees, unspecified whether rheumatoid factor present (HCC) (M06.9)  Primary osteoarthritis of right knee [M17.11]  Right total knee arthroplasty        Next MD visit: none scheduled  Fall Risk: standard         Precautions: n/a          Medication Changes since last visit?: No  Pain ratin/10  Subjective: Reports stretching knee into extension hurts the most.      Objective:  Hard end-feel noted with (R) knee extension. Assessment:   Chronic hip flexor tightness from premorbid painful gait along with glut weakness resulting in waddling gait; will benefit from active psoas lengthening and glut activation. Treatment:   Date: 2022  TX#: 4/MEDICAID Date:2022                 TX#: 5/MEDICAID Date:2022                 TX#: 6/MEDICAID Date:2022                TX#:7/ MEDICAID [id number]   Tx#: 8/MEDICAID Date:2022   Tx#: 9/MEDICAID Date:2022   Tx#: 10/MEDICAID     THERAPEUTIC EX 45 mins 45 mins 45 mins 45 mins 45 MINS 45 mins 45 mins     slantboard stretch L5 3 x 1 min; (R) only on last rep L5 3 x 1 min; (R) only on last rep L5 3 x 1 min; (R) only on last rep L5 3 x 1 min; (R) only on last rep L5 3 x 1 min; (R) only on last rep L5 (R) only 2x1 min L5 (R) only 2 x 1 min     TM retrowalk 12% grade 0.8mph x 10 mins focus on (R) knee flexion/ext 12% grade 1mph x 10 mins focus on (R) knee flexion/ext 12% grade 1mph x 10 mins focus on (R) knee flexion/ext 12% grade 1mph x 10 mins focus on (R) knee flexion/ext 12% grade 1mph x 10 mins focus on (R) knee flexion/ext 12% grade 1. 2mph x 10 mins focus on (R) knee flexion/ext 12% grade 1. 2mph x 10 mins focus on (R) knee flexion/ext     (R) knee PROM 10 mins  5 mins 5 mins 10 mins focus on ext 10 mins focus on ext 10 mins focus on ext 10 mins focus on ext     SB bridging GSB 3x10 GSB 3x10 GSB 3x10   GSB 3x10 GSB 3x10 with contralateral hip/knee flexion for (R) glut activation     SB hams curls GSB 3x10 GSB 3x10 GSB 3x10   GSB 3x10 GSB 3x10     (R) SLR 3x10  3x10 1.5lb         Prone (R) TKE 3x10  3x10 1.5lb         Glut raises 3x10  3x10 1.5lb         Prone knee flexion 3x10  3x10 1.5lb         TKE Green tband (R) 3x10   Green tband 3x10 Blue tband 3x10       Reverse TKE Green tband (R) 3x10   Green tband 3x10 Blue tband 3x10       NuStep LE only full ext L7 x 10 mins L7 x 10 mins L7 x 10 mins L7 x 10 mins L8 x 10 mins L8 x 10 mins L8 x 10 mins      Step up  6\" 3x10    BOSU 3x10 with march BOSU 3x10 with march     Lateral step down  6\" to airex pad 3x10   6\" 3x10       Stool scoots  2 x 20 ft fwd/retro          3 way hip  airex 3x10 (R) stance on airex  BOSU 3x10 BOSU 3x10 BOSU yellow tband 3x10 BOSU yellow tband 3x10     SL hip add   3x10 1.5lb         Lateral walking    10 x 10 ft red tband        Fwd/retro monster walk    10 x 10 ft red tband        Fwd step down     3x10 6\" 3x10 on stair 3x10 on stair     Shuttle (R)     3 band 3x15 4 band 3x15 4 band 2x15     Shuttle (B)     6 band 3x10       Standing UE WB on wall alt/opp UE/LE lifts      3x10 with 3 sec hold 3x10 with 3 sec hold     Lateral step up with dips      BOSU 3x10 BOSU 3x10                 MANUAL TX 8 mins 8 mins 8 mins 8 mins 8 mins 8 mins 8 mins     (R) patellofemoral/tibfemoral 5 mins all planes 5 mins all planes 5 mins all planes 5 mins all planes 5 mins all planes 5 mins all planes 5 mins all planes     Scar mobs (R) Knee 3 mins 3 mins (R) knee 3 mins (R) knee 3 mins (R) knee 3 mins (R) knee 3 mins (R) knee 3 mins (R) knee     HEP: Continue current HEP.     Charges: EX3, Manual Tx x 1       Total Timed Treatment: 53 min  Total Treatment Time: 53 min      Plan: Continue to progress glut strengthening and psoas stretching to improve gait symmetry

## 2022-07-21 ENCOUNTER — APPOINTMENT (OUTPATIENT)
Dept: PHYSICAL THERAPY | Age: 67
End: 2022-07-21
Attending: INTERNAL MEDICINE
Payer: MEDICAID

## 2022-07-26 ENCOUNTER — OFFICE VISIT (OUTPATIENT)
Dept: PHYSICAL THERAPY | Age: 67
End: 2022-07-26
Attending: INTERNAL MEDICINE
Payer: MEDICAID

## 2022-07-26 PROCEDURE — 97110 THERAPEUTIC EXERCISES: CPT | Performed by: PHYSICAL THERAPIST

## 2022-07-26 NOTE — PROGRESS NOTES
Diagnosis: Rheumatoid arthritis involving both knees, unspecified whether rheumatoid factor present (HCC) (M06.9)  Primary osteoarthritis of right knee [M17.11]  Right total knee arthroplasty        Next MD visit: none scheduled  Fall Risk: standard         Precautions: n/a          Medication Changes since last visit?: No   Date of last PN: 2022  Pain ratin/10  Subjective: No new c/o. Objective:  Still negotiates stairs via step-to pattern; demonstrates ability to perform reciprocal pattern with cueing and min UE support on railing. Assessment:   Apprehension with LE WB in SLS limiting tolerance to ability to negotiate uneven surfaces like stairs. Treatment:   Date:2022                TX#:7/ MEDICAID LSIA:   Tx#: 8/MEDICAID Date:2022   Tx#: /MEDICAID Date:2022   Tx#: 10/MEDICAID Date:2022   Tx#:  per POC    THERAPEUTIC EX 45 mins 45 MINS 45 mins 45 mins 60 mins    slantboard stretch L5 3 x 1 min; (R) only on last rep L5 3 x 1 min; (R) only on last rep L5 (R) only 2x1 min L5 (R) only 2 x 1 min L5 (R) only 2 x 1 min    TM retrowalk 12% grade 1mph x 10 mins focus on (R) knee flexion/ext 12% grade 1mph x 10 mins focus on (R) knee flexion/ext 12% grade 1. 2mph x 10 mins focus on (R) knee flexion/ext 12% grade 1. 2mph x 10 mins focus on (R) knee flexion/ext 12% grade 1. 2mph x 10 mins focus on (R) knee flexion/ext    (R) knee PROM 10 mins focus on ext 10 mins focus on ext 10 mins focus on ext 10 mins focus on ext     SB bridging   GSB 3x10 GSB 3x10 with contralateral hip/knee flexion for (R) glut activation     SB hams curls   GSB 3x10 GSB 3x10     Retro cable pulls     15lbs 3x10    Lift and carry     1 to 3lb dumbells carrying up and down stairs x 5 ea    Glut raises         Prone knee flexion         TKE Green tband 3x10 Blue tband 3x10   Green tband with airex step overs 3x10    Reverse TKE Green tband 3x10 Blue tband 3x10   Green tband with airex step overs 3x10 NuStep LE only full ext L7 x 10 mins L8 x 10 mins L8 x 10 mins L8 x 10 mins  L8 x 10 mins     Step up   BOSU 3x10 with march BOSU 3x10 with march Red tband around knees BOSU 3x10 with march and (L) knee flareout    Lateral step down  6\" 3x10   Stair 3x10    Stool scoots     5 laps (R)    3 way hip BOSU 3x10 BOSU 3x10 BOSU yellow tband 3x10 BOSU yellow tband 3x10     SL hip add         Lateral walking 10 x 10 ft red tband        Fwd/retro monster walk 10 x 10 ft red tband        Fwd step down  3x10 6\" 3x10 on stair 3x10 on stair 3x10 on stair    Shuttle (R)  3 band 3x15 4 band 3x15 4 band 2x15 5 band 3x10 (R)    Shuttle (B)  6 band 3x10   8 band 3x10 with (L) SLR in full ext    Standing UE WB on wall alt/opp UE/LE lifts   3x10 with 3 sec hold 3x10 with 3 sec hold     Lateral step up with dips   BOSU 3x10 BOSU 3x10 BOSU with red tband around knees 3x10             MANUAL TX 8 mins 8 mins 8 mins 8 mins     (R) patellofemoral/tibfemoral 5 mins all planes 5 mins all planes 5 mins all planes 5 mins all planes     Scar mobs 3 mins (R) knee 3 mins (R) knee 3 mins (R) knee 3 mins (R) knee     HEP: Continue current HEP.     Charges: EX4       Total Timed Treatment: 60 min  Total Treatment Time: 60 min      Plan: Continue to progress functional strengthening as tolerated

## 2022-07-28 ENCOUNTER — OFFICE VISIT (OUTPATIENT)
Dept: PHYSICAL THERAPY | Age: 67
End: 2022-07-28
Attending: INTERNAL MEDICINE
Payer: MEDICAID

## 2022-07-28 PROCEDURE — 97110 THERAPEUTIC EXERCISES: CPT | Performed by: PHYSICAL THERAPIST

## 2022-07-28 NOTE — PROGRESS NOTES
Diagnosis: Rheumatoid arthritis involving both knees, unspecified whether rheumatoid factor present (HCC) (M06.9)  Primary osteoarthritis of right knee [M17.11]  Right total knee arthroplasty        Next MD visit: none scheduled  Fall Risk: standard         Precautions: n/a          Medication Changes since last visit?: No   Date of last PN: 2022  Pain ratin/10  Subjective: No new c/o. Objective:  Demonstrates ability to negotiate stairs via reciprocal pattern with cueing    Assessment:   Needs encouragement to normalize movement patterns to improve functional use of available (R) knee AROM and strength. Treatment:   [id number]                TX#:7/ MEDICAID VTYY:   Tx#: 8/MEDICAID Date:2022   Tx#: /MEDICAID Date:2022   Tx#: 10/MEDICAID Date:2022   Tx#:  per POC Date:2022   Tx#:  per POC   THERAPEUTIC EX 45 mins 45 MINS 45 mins 45 mins 60 mins 60 mins   slantboard stretch L5 3 x 1 min; (R) only on last rep L5 3 x 1 min; (R) only on last rep L5 (R) only 2x1 min L5 (R) only 2 x 1 min L5 (R) only 2 x 1 min L5 (R) only 2 x 1 min   TM retrowalk 12% grade 1mph x 10 mins focus on (R) knee flexion/ext 12% grade 1mph x 10 mins focus on (R) knee flexion/ext 12% grade 1. 2mph x 10 mins focus on (R) knee flexion/ext 12% grade 1. 2mph x 10 mins focus on (R) knee flexion/ext 12% grade 1. 2mph x 10 mins focus on (R) knee flexion/ext 12% grade 1. 2mph x 10 mins focus on (R) knee flexion/ext   (R) knee PROM 10 mins focus on ext 10 mins focus on ext 10 mins focus on ext 10 mins focus on ext     SB bridging   GSB 3x10 GSB 3x10 with contralateral hip/knee flexion for (R) glut activation     SB hams curls   GSB 3x10 GSB 3x10     Retro cable pulls     15lbs 3x10    Lift and carry     1 to 3lb dumbells carrying up and down stairs x 5 ea    Stairs x 5       15 laps via reciprocal pattern   Prone knee flexion         TKE Green tband 3x10 Blue tband 3x10   Green tband with airex step overs 3x10 Blue tband with airex step overs 3x10   Reverse TKE Green tband 3x10 Blue tband 3x10   Green tband with airex step overs 3x10 Blue tband with airex step overs 3x10   NuStep LE only full ext L7 x 10 mins L8 x 10 mins L8 x 10 mins L8 x 10 mins  L8 x 10 mins     Step up   BOSU 3x10 with march BOSU 3x10 with march Red tband around knees BOSU 3x10 with march and (L) knee flareout    Lateral step down  6\" 3x10   Stair 3x10    Stool scoots     5 laps (R)    3 way hip BOSU 3x10 BOSU 3x10 BOSU yellow tband 3x10 BOSU yellow tband 3x10  BOSU green tband   SL hip add         Lateral walking 10 x 10 ft red tband     5 x 20ft green tband   Fwd/retro monster walk 10 x 10 ft red tband     5 x 20 ft green tband   Fwd step down  3x10 6\" 3x10 on stair 3x10 on stair 3x10 on stair 3x10 stair   Shuttle (R)  3 band 3x15 4 band 3x15 4 band 2x15 5 band 3x10 (R) 5 band 3x10 (R) green tband around knees   Shuttle (B)  6 band 3x10   8 band 3x10 with (L) SLR in full ext 8 band 3x10 with (L) SLR in full ext green tband around knees   Standing UE WB on wall alt/opp UE/LE lifts   3x10 with 3 sec hold 3x10 with 3 sec hold     Lateral step up with dips   BOSU 3x10 BOSU 3x10 BOSU with red tband around knees 3x10 BOSU with greentband around knees 3x10            MANUAL TX 8 mins 8 mins 8 mins 8 mins     (R) patellofemoral/tibfemoral 5 mins all planes 5 mins all planes 5 mins all planes 5 mins all planes     Scar mobs 3 mins (R) knee 3 mins (R) knee 3 mins (R) knee 3 mins (R) knee     HEP: Refer to patient instructions    Charges: EX4       Total Timed Treatment: 60 min  Total Treatment Time: 60 min      Plan: Continue to progress functional strengthening as tolerated

## 2022-08-02 ENCOUNTER — OFFICE VISIT (OUTPATIENT)
Dept: PHYSICAL THERAPY | Age: 67
End: 2022-08-02
Attending: INTERNAL MEDICINE
Payer: MEDICAID

## 2022-08-02 PROCEDURE — 97110 THERAPEUTIC EXERCISES: CPT | Performed by: PHYSICAL THERAPIST

## 2022-08-04 ENCOUNTER — OFFICE VISIT (OUTPATIENT)
Dept: PHYSICAL THERAPY | Age: 67
End: 2022-08-04
Attending: INTERNAL MEDICINE
Payer: MEDICAID

## 2022-08-04 PROCEDURE — 97110 THERAPEUTIC EXERCISES: CPT | Performed by: PHYSICAL THERAPIST

## 2022-08-04 NOTE — PROGRESS NOTES
Diagnosis: Rheumatoid arthritis involving both knees, unspecified whether rheumatoid factor present (HCC) (M06.9)  Primary osteoarthritis of right knee [M17.11]  Right total knee arthroplasty        Next MD visit: none scheduled  Fall Risk: standard         Precautions: n/a          Medication Changes since last visit?: No   Date of last PN: 2022  Pain ratin/10  Subjective: c/o feeling her (R)LE wants to rotate outwards all the time. Objective:  Reviewed need for volitional control of (R)LE alignment and continued stretching into extension as knee extension lag promoting hip ER. Now able to negotiate stairs via reciprocal pattern. Assessment:   Needs regular compliance with HEP as continued need for cueing during ex's not supporting carry-over of HEP. Treatment:   Date:2022   Tx#: /MEDICAID Date:2022   Tx#: 10/MEDICAID Date:2022   Tx#:  per POC Date:2022   Tx#:  per POC Date:2022   Tx#:  per POC Date:2022   Tx#:  per POC   THERAPEUTIC EX 45 mins 45 mins 60 mins 60 mins 60 mins 60 mins   slantboard stretch L5 (R) only 2x1 min L5 (R) only 2 x 1 min L5 (R) only 2 x 1 min L5 (R) only 2 x 1 min L5 (R) only 2 x 1 min L5 (R)LE only 2x1 min   TM retrowalk 12% grade 1. 2mph x 10 mins focus on (R) knee flexion/ext 12% grade 1. 2mph x 10 mins focus on (R) knee flexion/ext 12% grade 1. 2mph x 10 mins focus on (R) knee flexion/ext 12% grade 1. 2mph x 10 mins focus on (R) knee flexion/ext 12% grade 1. 2mph x 10 mins red tband resistance focus on (R) knee flexion/ext 12% grade 1. 2mph x 10 mins red tband resistance focus on (R) knee flexion/ext   (R) knee PROM 10 mins focus on ext 10 mins focus on ext       SB bridging GSB 3x10 GSB 3x10 with contralateral hip/knee flexion for (R) glut activation       SB hams curls GSB 3x10 GSB 3x10       Retro cable pulls   15lbs 3x10  20lbs 3x10 20lbs 3x10   Lift and carry   1 to 3lb dumbells carrying up and down stairs x 5 ea Stairs x 5     15 laps via reciprocal pattern 15 laps via reciprocal pattern 1lb ea ue 15 laps via reciprocal pattern 1lb ea ue   Reverse woodchops     10lbs 3x10 (B) 10lbs 3x10 (B)   TKE   Green tband with airex step overs 3x10 Blue tband with airex step overs 3x10     Reverse TKE   Green tband with airex step overs 3x10 Blue tband with airex step overs 3x10     NuStep LE only full ext L8 x 10 mins L8 x 10 mins  L8 x 10 mins    L8 x 10mins   Step up BOSU 3x10 with march BOSU 3x10 with march Red tband around knees BOSU 3x10 with march and (L) knee flareout      Lateral step down   Stair 3x10  Stair 3x10 Stair 3x10 with green tband around knees and contralateral knee flare outs at SLS   Stool scoots   5 laps (R)  10 laps 10 laps   3 way hip BOSU yellow tband 3x10 BOSU yellow tband 3x10  BOSU green tband BOSU green tband BOSU green tband   SL hip add         Lateral walking    5 x 20ft green tband 5 x 20 ft green tband 5 x 20 ft green tband   Fwd/retro monster walk    5 x 20 ft green tband 5 x 20 ft green tband 5 x 20 ft green tband   Fwd step down 3x10 on stair 3x10 on stair 3x10 on stair 3x10 stair 3x10 stair 3x10 stair   Shuttle (R) 4 band 3x15 4 band 2x15 5 band 3x10 (R) 5 band 3x10 (R) green tband around knees 5 band 3x10 (R) green tband around knees 5 band 3x10 (R) green tband around knees   Shuttle (B)   8 band 3x10 with (L) SLR in full ext 8 band 3x10 with (L) SLR in full ext green tband around knees 8 band 3x10 with (L) SLR in full ext green tband around knees 8 band 3x10 with (L) SLR in full ext green tband around knees   Standing UE WB on wall alt/opp UE/LE lifts 3x10 with 3 sec hold 3x10 with 3 sec hold       Lateral step up with dips BOSU 3x10 BOSU 3x10 BOSU with red tband around knees 3x10 BOSU with greentband around knees 3x10 BOSU with greentband around knees 3x10 BOSU with greentband around knees 3x10            MANUAL TX 8 mins 8 mins       (R) patellofemoral/tibfemoral 5 mins all planes 5 mins all planes       Scar mobs 3 mins (R) knee 3 mins (R) knee       HEP: Continue established HEP    Charges: EX4       Total Timed Treatment: 60 min  Total Treatment Time: 60 min      Plan: Continue to progress functional strengthening as tolerated

## 2022-08-09 ENCOUNTER — APPOINTMENT (OUTPATIENT)
Dept: PHYSICAL THERAPY | Age: 67
End: 2022-08-09
Attending: INTERNAL MEDICINE
Payer: MEDICAID

## 2022-08-11 ENCOUNTER — APPOINTMENT (OUTPATIENT)
Dept: PHYSICAL THERAPY | Age: 67
End: 2022-08-11
Attending: INTERNAL MEDICINE
Payer: MEDICAID

## 2022-08-16 ENCOUNTER — OFFICE VISIT (OUTPATIENT)
Dept: PHYSICAL THERAPY | Age: 67
End: 2022-08-16
Attending: INTERNAL MEDICINE
Payer: MEDICAID

## 2022-08-16 ENCOUNTER — APPOINTMENT (OUTPATIENT)
Dept: PHYSICAL THERAPY | Age: 67
End: 2022-08-16
Attending: INTERNAL MEDICINE
Payer: MEDICAID

## 2022-08-16 PROCEDURE — 97110 THERAPEUTIC EXERCISES: CPT | Performed by: PHYSICAL THERAPIST

## 2022-08-16 NOTE — PROGRESS NOTES
Diagnosis: Rheumatoid arthritis involving both knees, unspecified whether rheumatoid factor present (HCC) (M06.9)  Primary osteoarthritis of right knee [M17.11]  Right total knee arthroplasty        Next MD visit: none scheduled  Fall Risk: standard         Precautions: n/a          Medication Changes since last visit?: No   Date of last PN: 2022  Pain ratin/10  Subjective: No c/o reported. Objective:  Symmetrical gait noted favoring (L)LE as much as the (R) with (L) knee degenerative changes influencing tolerance to [facility] activities. .    Assessment:   Tolerating continued strengthening program and meeting goals within remaining visits scheduled. Treatment:   Date:2022   Tx#:  per POC Date:2022   Tx#:  per POC Date:2022   Tx#:  per POC Date:2022   Tx#: 15/18 per POC      THERAPEUTIC EX 60 mins 60 mins 60 mins 60 mins      slantboard stretch L5 (R) only 2 x 1 min L5 (R) only 2 x 1 min L5 (R)LE only 2x1 min L5 (R)LE only 2x1 min      TM retrowalk 12% grade 1. 2mph x 10 mins focus on (R) knee flexion/ext 12% grade 1. 2mph x 10 mins red tband resistance focus on (R) knee flexion/ext 12% grade 1. 2mph x 10 mins red tband resistance focus on (R) knee flexion/ext 12% grade 1. 2mph x 10 mins red tband resistance focus on (R) knee flexion/ext      (R) knee PROM          SB wall squat    3x10 with green tband around knees for valgus control      SB hams curls          Retro cable pulls  20lbs 3x10 20lbs 3x10 25lbs 3x10      Lift and carry          Stairs x 5  15 laps via reciprocal pattern 15 laps via reciprocal pattern 1lb ea ue 15 laps via reciprocal pattern 1lb ea ue 15 laps via reciprocal pattern 2lb ea ue      Reverse woodchops  10lbs 3x10 (B) 10lbs 3x10 (B) 10lbs 3x10 (B)      TKE Blue tband with airex step overs 3x10         Reverse TKE Blue tband with airex step overs 3x10         NuStep LE only full ext   L8 x 10mins L8 x 10 mins      Step up          Lateral step down Stair 3x10 Stair 3x10 with green tband around knees and contralateral knee flare outs at SLS Stair 3x10 with green tband around knees and contralateral knee flare outs at SLS      Stool scoots  10 laps 10 laps 10 laps      3 way hip BOSU green tband BOSU green tband BOSU green tband       SL hip add          Lateral walking 5 x 20ft green tband 5 x 20 ft green tband 5 x 20 ft green tband       Fwd/retro monster walk 5 x 20 ft green tband 5 x 20 ft green tband 5 x 20 ft green tband       Fwd step down 3x10 stair 3x10 stair 3x10 stair       Shuttle (R) 5 band 3x10 (R) green tband around knees 5 band 3x10 (R) green tband around knees 5 band 3x10 (R) green tband around knees 6 band 3x10 (R) green tband around knees      Shuttle (B) 8 band 3x10 with (L) SLR in full ext green tband around knees 8 band 3x10 with (L) SLR in full ext green tband around knees 8 band 3x10 with (L) SLR in full ext green tband around knees       Standing UE WB on wall alt/opp UE/LE lifts          Lateral step up with dips BOSU with greentband around knees 3x10 BOSU with greentband around knees 3x10 BOSU with greentband around knees 3x10 BOSU with greentband around knees 3x10      woodchops    15lb 3x10 (B)                                    HEP: Continue established HEP    Charges: EX4       Total Timed Treatment: 60 min  Total Treatment Time: 60 min      Plan: Continue to progress functional strengthening as tolerated

## 2022-08-17 ENCOUNTER — OFFICE VISIT (OUTPATIENT)
Dept: PHYSICAL THERAPY | Age: 67
End: 2022-08-17
Attending: INTERNAL MEDICINE
Payer: MEDICAID

## 2022-08-17 PROCEDURE — 97110 THERAPEUTIC EXERCISES: CPT | Performed by: PHYSICAL THERAPIST

## 2022-08-17 NOTE — PROGRESS NOTES
Diagnosis: Rheumatoid arthritis involving both knees, unspecified whether rheumatoid factor present (HCC) (M06.9)  Primary osteoarthritis of right knee [M17.11]  Right total knee arthroplasty        Next MD visit: none scheduled  Fall Risk: standard         Precautions: n/a          Medication Changes since last visit?: No   Date of last PN: 2022  Pain ratin/10  Subjective: Now only c/o pain on her non-operated knee which is due for surgery in about 6 months      Objective:  Now favoring (L)LE with gait on uneven surfaces/stairs with (R) knee strength now greater than the pain inhibited (L). Assessment:   Meeting goals towards discharge in 2 more sessions. Treatment:   Date:2022   Tx#:  per POC Date:2022   Tx#:  per POC Date:2022   Tx#:  per POC Date:2022   Tx#: 15/18 per POC Date:2022   Tx#:  per POC     THERAPEUTIC EX 60 mins 60 mins 60 mins 60 mins 60 mins     slantboard stretch L5 (R) only 2 x 1 min L5 (R) only 2 x 1 min L5 (R)LE only 2x1 min L5 (R)LE only 2x1 min L5 (R)LE only 2x1 min     TM retrowalk 12% grade 1. 2mph x 10 mins focus on (R) knee flexion/ext 12% grade 1. 2mph x 10 mins red tband resistance focus on (R) knee flexion/ext 12% grade 1. 2mph x 10 mins red tband resistance focus on (R) knee flexion/ext 12% grade 1. 2mph x 10 mins red tband resistance focus on (R) knee flexion/ext 12% grade 1. 2mph x 10 mins green tband resistance focus on (R) knee flexion/ext     (R) knee PROM          SB wall squat    3x10 with green tband around knees for valgus control 3x10 with green tband around knees for valgus control     SB hams curls          Retro cable pulls  20lbs 3x10 20lbs 3x10 25lbs 3x10 25lbs 3x10     Lift and carry          Stairs x 5  15 laps via reciprocal pattern 15 laps via reciprocal pattern 1lb ea ue 15 laps via reciprocal pattern 1lb ea ue 15 laps via reciprocal pattern 2lb ea ue 15 laps via reciprocal pattern 2lb ea ue     Reverse woodchops  10lbs 3x10 (B) 10lbs 3x10 (B) 10lbs 3x10 (B) 10lbs 3x10 (B)     TKE Blue tband with airex step overs 3x10         Reverse TKE Blue tband with airex step overs 3x10         NuStep LE only full ext   L8 x 10mins L8 x 10 mins      Step up          Lateral step down  Stair 3x10 Stair 3x10 with green tband around knees and contralateral knee flare outs at SLS Stair 3x10 with green tband around knees and contralateral knee flare outs at SLS Stair 3x10 with green tband around knees and contralateral knee flare outs at SLS     Stool scoots  10 laps 10 laps 10 laps 10 laps     3 way hip BOSU green tband BOSU green tband BOSU green tband       SL hip add          Lateral walking 5 x 20ft green tband 5 x 20 ft green tband 5 x 20 ft green tband  5 x 20 ft green tband     Fwd/retro monster walk 5 x 20 ft green tband 5 x 20 ft green tband 5 x 20 ft green tband  5 x 20 ft green tband     Fwd step down 3x10 stair 3x10 stair 3x10 stair       Shuttle (R) 5 band 3x10 (R) green tband around knees 5 band 3x10 (R) green tband around knees 5 band 3x10 (R) green tband around knees  6 band 3x10 (R) green tband around knees     Shuttle (B) 8 band 3x10 with (L) SLR in full ext green tband around knees 8 band 3x10 with (L) SLR in full ext green tband around knees 8 band 3x10 with (L) SLR in full ext green tband around knees  8 band 3x10 with (L) SLR in full ext green tband around knees     Standing UE WB on wall alt/opp UE/LE lifts          Lateral step up with dips BOSU with greentband around knees 3x10 BOSU with greentband around knees 3x10 BOSU with greentband around knees 3x10 BOSU with greentband around knees 3x10 BOSU with greentband around knees 3x10     woodchops    15lb 3x10 (B) 15lb 3x10 (B)                                   HEP: Continue established HEP    Charges: EX4       Total Timed Treatment: 60 min  Total Treatment Time: 60 min      Plan: Continue to progress functional strengthening as tolerated.  Finalize HEP.

## 2022-08-18 ENCOUNTER — APPOINTMENT (OUTPATIENT)
Dept: PHYSICAL THERAPY | Age: 67
End: 2022-08-18
Attending: INTERNAL MEDICINE
Payer: MEDICAID

## 2022-08-18 ENCOUNTER — HOSPITAL ENCOUNTER (OUTPATIENT)
Dept: MRI IMAGING | Age: 67
Discharge: HOME | End: 2022-08-18
Attending: ORTHOPAEDIC SURGERY
Payer: MEDICAID

## 2022-08-18 DIAGNOSIS — M06.9 RHEUMATOID ARTHRITIS INVOLVING BOTH KNEES, UNSPECIFIED WHETHER RHEUMATOID FACTOR PRESENT (HCC): ICD-10-CM

## 2022-08-18 PROCEDURE — 73721 MRI JNT OF LWR EXTRE W/O DYE: CPT | Performed by: ORTHOPAEDIC SURGERY

## 2022-08-23 ENCOUNTER — OFFICE VISIT (OUTPATIENT)
Dept: PHYSICAL THERAPY | Age: 67
End: 2022-08-23
Attending: INTERNAL MEDICINE
Payer: MEDICAID

## 2022-08-23 PROCEDURE — 97110 THERAPEUTIC EXERCISES: CPT | Performed by: PHYSICAL THERAPIST

## 2022-08-23 NOTE — PROGRESS NOTES
Diagnosis: Rheumatoid arthritis involving both knees, unspecified whether rheumatoid factor present (HCC) (M06.9)  Primary osteoarthritis of right knee [M17.11]  Right total knee arthroplasty        Next MD visit: none scheduled  Fall Risk: standard         Precautions: n/a          Medication Changes since last visit?: No   Date of last PN: 2022  Pain ratin/10  Subjective: No c/o with her (R) knee; (L) knee pain remains unchanged but no worse with exercises except with descending stairs. Objective:  Continues to favor (L)LE with descending stairs. Assessment:   Improving (R)LE function allowing return to normal community mobility within constraints of (L)knee problems with pending surgical intervention. Meeting goals. Treatment:   Date:2022   Tx#:  per POC Date:2022   Tx#:  per POC Date:2022   Tx#:  per POC Date:2022   Tx#: 15/18 per POC Date:2022   Tx#:  per POC Date:2022   Tx#:  per POC    THERAPEUTIC EX 60 mins 60 mins 60 mins 60 mins 60 mins 60 mins    slantboard stretch L5 (R) only 2 x 1 min L5 (R) only 2 x 1 min L5 (R)LE only 2x1 min L5 (R)LE only 2x1 min L5 (R)LE only 2x1 min L5 (R)LE only 2x1 min    TM retrowalk 12% grade 1. 2mph x 10 mins focus on (R) knee flexion/ext 12% grade 1. 2mph x 10 mins red tband resistance focus on (R) knee flexion/ext 12% grade 1. 2mph x 10 mins red tband resistance focus on (R) knee flexion/ext 12% grade 1. 2mph x 10 mins red tband resistance focus on (R) knee flexion/ext 12% grade 1. 2mph x 10 mins green tband resistance focus on (R) knee flexion/ext 12% grade 1. 2mph x 10 mins green tband resistance focus on (R) knee flexion/ext    (R) knee PROM          SB wall squat    3x10 with green tband around knees for valgus control 3x10 with green tband around knees for valgus control 3x10 with green tband around knees for valgus control with  hams curls          Retro cable pulls  20lbs 3x10 20lbs 3x10 25lbs 3x10 25lbs 3x10 25lbs 3x10    Lift and carry          Stairs x 5  15 laps via reciprocal pattern 15 laps via reciprocal pattern 1lb ea ue 15 laps via reciprocal pattern 1lb ea ue 15 laps via reciprocal pattern 2lb ea ue 15 laps via reciprocal pattern 2lb ea ue 15 laps via reciprocal pattern 3lb ea ue    Reverse woodchops  10lbs 3x10 (B) 10lbs 3x10 (B) 10lbs 3x10 (B) 10lbs 3x10 (B) 15lbs 3x10 (B)    TKE Blue tband with airex step overs 3x10         Reverse TKE Blue tband with airex step overs 3x10         NuStep LE only full ext   L8 x 10mins L8 x 10 mins  L8 x 10 mins    Step up          Lateral step down  Stair 3x10 Stair 3x10 with green tband around knees and contralateral knee flare outs at SLS Stair 3x10 with green tband around knees and contralateral knee flare outs at SLS Stair 3x10 with green tband around knees and contralateral knee flare outs at SLS Stair 3x10 with green tband around knees and contralateral knee flare outs at SLS    Stool scoots  10 laps 10 laps 10 laps 10 laps     3 way hip BOSU green tband BOSU green tband BOSU green tband       SL hip add          Lateral walking 5 x 20ft green tband 5 x 20 ft green tband 5 x 20 ft green tband  5 x 20 ft green tband 10 x 20 ft blue tband around knees    Fwd/retro monster walk 5 x 20 ft green tband 5 x 20 ft green tband 5 x 20 ft green tband  5 x 20 ft green tband 10 x 20 ft blue tband around knees    Fwd step down 3x10 stair 3x10 stair 3x10 stair       Shuttle (R) 5 band 3x10 (R) green tband around knees 5 band 3x10 (R) green tband around knees 5 band 3x10 (R) green tband around knees  6 band 3x10 (R) green tband around knees 7 band 3x10 (R) green tband around knees    Shuttle (B) 8 band 3x10 with (L) SLR in full ext green tband around knees 8 band 3x10 with (L) SLR in full ext green tband around knees 8 band 3x10 with (L) SLR in full ext green tband around knees  8 band 3x10 with (L) SLR in full ext green tband around knees 8 band 3x10 with (L) SLR in full ext green tband around knees    Standing UE WB on wall alt/opp UE/LE lifts          Lateral step up with dips BOSU with greentband around knees 3x10 BOSU with greentband around knees 3x10 BOSU with greentband around knees 3x10 BOSU with greentband around knees 3x10 BOSU with greentband around knees 3x10 BOSU with greentband around knees 3x10    woodchops    15lb 3x10 (B) 15lb 3x10 (B) 20lbs 3x10 (B)                                  HEP: Continue established HEP    Charges: EX4       Total Timed Treatment: 60 min  Total Treatment Time: 60 min      Plan: Finalize HEP.

## 2022-08-25 ENCOUNTER — OFFICE VISIT (OUTPATIENT)
Dept: PHYSICAL THERAPY | Age: 67
End: 2022-08-25
Attending: INTERNAL MEDICINE
Payer: MEDICAID

## 2022-08-25 PROCEDURE — 97110 THERAPEUTIC EXERCISES: CPT | Performed by: PHYSICAL THERAPIST

## 2022-08-25 NOTE — PROGRESS NOTES
Dottie  Pt has attended 18 visits in Physical Therapy. Diagnosis: Rheumatoid arthritis involving both knees, unspecified whether rheumatoid factor present ([facility]) (M06.9)  Primary osteoarthritis of right knee [M17.11]  Right total knee arthroplasty        Next MD visit: none scheduled  Fall Risk: standard         Precautions: n/a          Medication Changes since last visit?: No   Date of last PN: 2022  Pain ratin/10  Assessment:   Zeus Anne has now completed her course of physical therapy with return to normal functional use of her (R)LE but overall LE function continues to be limited by comorbidity in her (L) knee pending surgery. She has met her goals with increased AROM and strength in her (R) knee allowing her to favor the (L)LE for CKC activities and ADL's. She is now discharged from PT with her (I) HEP towards further self-recovery and maintaining her gains from PT. Subjective: Only c/o pain in her (L) knee. Objective:  WNL (R) knee AROM ans strength grossly 4+ to 5/5; ambulatory (I) without device at community level. Treatment:   Date:2022   Tx#:  per POC Date:2022   Tx#:  per POC Date:2022   Tx#:  per POC Date:2022   Tx#: 15 per POC Date:2022   Tx#: 16 per POC Date:2022   Tx#: 18 per POC Date:2022   Tx#:  per POC   THERAPEUTIC EX 60 mins 60 mins 60 mins 60 mins 60 mins 60 mins 60 mins   slantboard stretch L5 (R) only 2 x 1 min L5 (R) only 2 x 1 min L5 (R)LE only 2x1 min L5 (R)LE only 2x1 min L5 (R)LE only 2x1 min L5 (R)LE only 2x1 min L5 (R)LE only 2x1 min   TM retrowalk 12% grade 1. 2mph x 10 mins focus on (R) knee flexion/ext 12% grade 1. 2mph x 10 mins red tband resistance focus on (R) knee flexion/ext 12% grade 1. 2mph x 10 mins red tband resistance focus on (R) knee flexion/ext 12% grade 1. 2mph x 10 mins red tband resistance focus on (R) knee flexion/ext 12% grade 1. 2mph x 10 mins green tband resistance focus on (R) knee flexion/ext 12% grade 1. 2mph x 10 mins green tband resistance focus on (R) knee flexion/ext 12% grade 1. 2mph x 10 mins green tband resistance focus on (R) knee flexion/ext   (R) knee PROM          SB wall squat    3x10 with green tband around knees for valgus control 3x10 with green tband around knees for valgus control 3x10 with green tband around knees for valgus control with march 3x10 with green tband around knees for valgus control with march   SB hams curls          Retro cable pulls  20lbs 3x10 20lbs 3x10 25lbs 3x10 25lbs 3x10 25lbs 3x10 25lbs 3x10   Lift and carry          Stairs x 5  15 laps via reciprocal pattern 15 laps via reciprocal pattern 1lb ea ue 15 laps via reciprocal pattern 1lb ea ue 15 laps via reciprocal pattern 2lb ea ue 15 laps via reciprocal pattern 2lb ea ue 15 laps via reciprocal pattern 3lb ea ue 15 laps via reciprocal pattern 5lb ea ue   Reverse woodchops  10lbs 3x10 (B) 10lbs 3x10 (B) 10lbs 3x10 (B) 10lbs 3x10 (B) 15lbs 3x10 (B) 15lbs 3x10 (B)   TKE Blue tband with airex step overs 3x10         Reverse TKE Blue tband with airex step overs 3x10         NuStep LE only full ext   L8 x 10mins L8 x 10 mins  L8 x 10 mins L8 x 10 mins   Step up          Lateral step down  Stair 3x10 Stair 3x10 with green tband around knees and contralateral knee flare outs at SLS Stair 3x10 with green tband around knees and contralateral knee flare outs at SLS Stair 3x10 with green tband around knees and contralateral knee flare outs at SLS Stair 3x10 with green tband around knees and contralateral knee flare outs at SLS Stair 3x10 with green tband around knees and contralateral knee flare outs at SLS   Stool scoots  10 laps 10 laps 10 laps 10 laps  10 laps   3 way hip BOSU green tband BOSU green tband BOSU green tband       SL hip add          Lateral walking 5 x 20ft green tband 5 x 20 ft green tband 5 x 20 ft green tband  5 x 20 ft green tband 10 x 20 ft blue tband around knees 10 x 20 ft blue tband around knees   Fwd/retro monster walk 5 x 20 ft green tband 5 x 20 ft green tband 5 x 20 ft green tband  5 x 20 ft green tband 10 x 20 ft blue tband around knees 10 x 20 ft blue tband around knees   Fwd step down 3x10 stair 3x10 stair 3x10 stair       Shuttle (R) 5 band 3x10 (R) green tband around knees 5 band 3x10 (R) green tband around knees 5 band 3x10 (R) green tband around knees  6 band 3x10 (R) green tband around knees 7 band 3x10 (R) green tband around knees 8 band 3x10 (R) green tband around knees   Shuttle (B) 8 band 3x10 with (L) SLR in full ext green tband around knees 8 band 3x10 with (L) SLR in full ext green tband around knees 8 band 3x10 with (L) SLR in full ext green tband around knees  8 band 3x10 with (L) SLR in full ext green tband around knees 8 band 3x10 with (L) SLR in full ext green tband around knees 8 band 3x10 with (L) SLR in full ext green tband around knees   Standing UE WB on wall alt/opp UE/LE lifts          Lateral step up with dips BOSU with greentband around knees 3x10 BOSU with greentband around knees 3x10 BOSU with greentband around knees 3x10 BOSU with greentband around knees 3x10 BOSU with greentband around knees 3x10 BOSU with greentband around knees 3x10 BOSU with greentband around knees 3x10   woodchops    15lb 3x10 (B) 15lb 3x10 (B) 20lbs 3x10 (B) 20lbs 3x10 (B)                                 HEP: Refer to patient instructions. Charges: EX4       Total Timed Treatment: 60 min  Total Treatment Time: 60 min    FOTO: declined    Plan: Discharge with (I) HEP    Thank you for your referral. If you have any questions, please contact me at Dept: [phone].     Sincerely,  Electronically signed by therapist: Josefina Estrada, PT

## 2022-09-16 ENCOUNTER — OFFICE VISIT (OUTPATIENT)
Dept: ORTHOPEDICS CLINIC | Facility: CLINIC | Age: 67
End: 2022-09-16
Payer: MEDICAID

## 2022-09-16 DIAGNOSIS — M06.9 RHEUMATOID ARTHRITIS INVOLVING BOTH KNEES, UNSPECIFIED WHETHER RHEUMATOID FACTOR PRESENT (HCC): Primary | ICD-10-CM

## 2022-09-16 PROCEDURE — 99214 OFFICE O/P EST MOD 30 MIN: CPT | Performed by: ORTHOPAEDIC SURGERY

## 2022-09-19 ENCOUNTER — TELEPHONE (OUTPATIENT)
Dept: ORTHOPEDICS CLINIC | Facility: CLINIC | Age: 67
End: 2022-09-19

## 2022-09-19 NOTE — TELEPHONE ENCOUNTER
Type of surgery:  Left Total Knee Arthroplasty  Date: 11/28/22  Location:  [facility]  Medical Clearance:      *Medical: No      *Dental: No      *Other:  Prior Authorization Status: Pwending  Workers Comp:  Medacta/Sylvia: [id number]  Posey: Yes  POV: 12/14/22

## 2022-10-04 ENCOUNTER — OFFICE VISIT (OUTPATIENT)
Dept: RHEUMATOLOGY | Facility: CLINIC | Age: 67
End: 2022-10-04
Payer: MEDICAID

## 2022-10-04 ENCOUNTER — LAB ENCOUNTER (OUTPATIENT)
Dept: LAB | Facility: HOSPITAL | Age: 67
End: 2022-10-04
Attending: INTERNAL MEDICINE
Payer: MEDICAID

## 2022-10-04 VITALS
HEART RATE: 70 BPM | HEIGHT: 63 IN | BODY MASS INDEX: 26.93 KG/M2 | SYSTOLIC BLOOD PRESSURE: 134 MMHG | WEIGHT: 152 LBS | DIASTOLIC BLOOD PRESSURE: 82 MMHG

## 2022-10-04 DIAGNOSIS — Z51.81 THERAPEUTIC DRUG MONITORING: ICD-10-CM

## 2022-10-04 DIAGNOSIS — M25.562 CHRONIC PAIN OF BOTH KNEES: ICD-10-CM

## 2022-10-04 DIAGNOSIS — R76.12 POSITIVE QUANTIFERON-TB GOLD TEST: ICD-10-CM

## 2022-10-04 DIAGNOSIS — M25.561 CHRONIC PAIN OF BOTH KNEES: ICD-10-CM

## 2022-10-04 DIAGNOSIS — G89.29 CHRONIC PAIN OF BOTH KNEES: ICD-10-CM

## 2022-10-04 DIAGNOSIS — M05.9 SEROPOSITIVE RHEUMATOID ARTHRITIS (HCC): ICD-10-CM

## 2022-10-04 LAB
ALBUMIN SERPL-MCNC: 3.7 G/DL (ref 3.4–5)
ALT SERPL-CCNC: 20 U/L
AST SERPL-CCNC: 14 U/L (ref 15–37)
BASOPHILS # BLD AUTO: 0.02 X10(3) UL (ref 0–0.2)
BASOPHILS NFR BLD AUTO: 0.3 %
CREAT BLD-MCNC: 1.06 MG/DL
CRP SERPL-MCNC: <0.29 MG/DL (ref ?–0.3)
DEPRECATED RDW RBC AUTO: 54.4 FL (ref 35.1–46.3)
EOSINOPHIL # BLD AUTO: 0.12 X10(3) UL (ref 0–0.7)
EOSINOPHIL NFR BLD AUTO: 2.1 %
ERYTHROCYTE [DISTWIDTH] IN BLOOD BY AUTOMATED COUNT: 14 % (ref 11–15)
ERYTHROCYTE [SEDIMENTATION RATE] IN BLOOD: 8 MM/HR
GFR SERPLBLD BASED ON 1.73 SQ M-ARVRAT: 58 ML/MIN/1.73M2 (ref 60–?)
HCT VFR BLD AUTO: 34.1 %
HGB BLD-MCNC: 10.9 G/DL
IMM GRANULOCYTES # BLD AUTO: 0.01 X10(3) UL (ref 0–1)
IMM GRANULOCYTES NFR BLD: 0.2 %
LYMPHOCYTES # BLD AUTO: 2.73 X10(3) UL (ref 1–4)
LYMPHOCYTES NFR BLD AUTO: 46.9 %
MCH RBC QN AUTO: 33.9 PG (ref 26–34)
MCHC RBC AUTO-ENTMCNC: 32 G/DL (ref 31–37)
MCV RBC AUTO: 105.9 FL
MONOCYTES # BLD AUTO: 0.6 X10(3) UL (ref 0.1–1)
MONOCYTES NFR BLD AUTO: 10.3 %
NEUTROPHILS # BLD AUTO: 2.34 X10 (3) UL (ref 1.5–7.7)
NEUTROPHILS # BLD AUTO: 2.34 X10(3) UL (ref 1.5–7.7)
NEUTROPHILS NFR BLD AUTO: 40.2 %
PLATELET # BLD AUTO: 209 10(3)UL (ref 150–450)
RBC # BLD AUTO: 3.22 X10(6)UL
WBC # BLD AUTO: 5.8 X10(3) UL (ref 4–11)

## 2022-10-04 PROCEDURE — 84450 TRANSFERASE (AST) (SGOT): CPT

## 2022-10-04 PROCEDURE — 84460 ALANINE AMINO (ALT) (SGPT): CPT

## 2022-10-04 PROCEDURE — 3008F BODY MASS INDEX DOCD: CPT | Performed by: INTERNAL MEDICINE

## 2022-10-04 PROCEDURE — 82565 ASSAY OF CREATININE: CPT

## 2022-10-04 PROCEDURE — 86140 C-REACTIVE PROTEIN: CPT

## 2022-10-04 PROCEDURE — 82040 ASSAY OF SERUM ALBUMIN: CPT

## 2022-10-04 PROCEDURE — 36415 COLL VENOUS BLD VENIPUNCTURE: CPT

## 2022-10-04 PROCEDURE — 3079F DIAST BP 80-89 MM HG: CPT | Performed by: INTERNAL MEDICINE

## 2022-10-04 PROCEDURE — 3075F SYST BP GE 130 - 139MM HG: CPT | Performed by: INTERNAL MEDICINE

## 2022-10-04 PROCEDURE — 99214 OFFICE O/P EST MOD 30 MIN: CPT | Performed by: INTERNAL MEDICINE

## 2022-10-04 PROCEDURE — 85025 COMPLETE CBC W/AUTO DIFF WBC: CPT

## 2022-10-04 PROCEDURE — 85652 RBC SED RATE AUTOMATED: CPT

## 2022-10-04 RX ORDER — FOLIC ACID 1 MG/1
1 TABLET ORAL DAILY
Qty: 90 TABLET | Refills: 1 | Status: SHIPPED | OUTPATIENT
Start: 2022-10-04

## 2022-10-04 NOTE — PATIENT INSTRUCTIONS
You were seen today for rheumatoid arthritis  Joints are stable on methotrexate 6 pills weekly and Humira every 2 weeks  You will have your left knee replaced in November  Blood work today  See me in 3 to 4 months

## 2022-10-07 NOTE — PROGRESS NOTES
Spoke with patient. Informed of note below using language line Florencio Mendez [phone].  She had no further questions

## 2022-11-08 ENCOUNTER — TELEPHONE (OUTPATIENT)
Dept: RHEUMATOLOGY | Facility: CLINIC | Age: 67
End: 2022-11-08

## 2022-11-08 NOTE — TELEPHONE ENCOUNTER
Fax from [place]. Request for completed provider portion of patient assistance program. Completed and faxed back.

## 2022-11-09 ENCOUNTER — LAB ENCOUNTER (OUTPATIENT)
Dept: LAB | Age: 67
End: 2022-11-09
Attending: FAMILY MEDICINE
Payer: MEDICAID

## 2022-11-09 DIAGNOSIS — M25.561 CHRONIC PAIN OF BOTH KNEES: ICD-10-CM

## 2022-11-09 DIAGNOSIS — M25.562 CHRONIC PAIN OF BOTH KNEES: ICD-10-CM

## 2022-11-09 DIAGNOSIS — Z51.81 THERAPEUTIC DRUG MONITORING: ICD-10-CM

## 2022-11-09 DIAGNOSIS — M05.9 SEROPOSITIVE RHEUMATOID ARTHRITIS (HCC): ICD-10-CM

## 2022-11-09 DIAGNOSIS — G89.29 CHRONIC PAIN OF BOTH KNEES: ICD-10-CM

## 2022-11-09 DIAGNOSIS — Z01.818 PREOPERATIVE EXAMINATION, UNSPECIFIED: Primary | ICD-10-CM

## 2022-11-09 LAB
ALBUMIN SERPL-MCNC: 3.8 G/DL (ref 3.4–5)
ALBUMIN/GLOB SERPL: 1.2 {RATIO} (ref 1–2)
ALP LIVER SERPL-CCNC: 96 U/L
ALT SERPL-CCNC: 21 U/L
ANION GAP SERPL CALC-SCNC: 8 MMOL/L (ref 0–18)
AST SERPL-CCNC: 13 U/L (ref 15–37)
BASOPHILS # BLD AUTO: 0.01 X10(3) UL (ref 0–0.2)
BASOPHILS NFR BLD AUTO: 0.2 %
BILIRUB SERPL-MCNC: 0.4 MG/DL (ref 0.1–2)
BILIRUB UR QL: NEGATIVE
BUN BLD-MCNC: 22 MG/DL (ref 7–18)
BUN/CREAT SERPL: 20.8 (ref 10–20)
CALCIUM BLD-MCNC: 9.2 MG/DL (ref 8.5–10.1)
CHLORIDE SERPL-SCNC: 107 MMOL/L (ref 98–112)
CO2 SERPL-SCNC: 27 MMOL/L (ref 21–32)
COLOR UR: YELLOW
CREAT BLD-MCNC: 1.06 MG/DL
CRP SERPL-MCNC: <0.29 MG/DL (ref ?–0.3)
DEPRECATED RDW RBC AUTO: 55.1 FL (ref 35.1–46.3)
EOSINOPHIL # BLD AUTO: 0.14 X10(3) UL (ref 0–0.7)
EOSINOPHIL NFR BLD AUTO: 2.7 %
ERYTHROCYTE [DISTWIDTH] IN BLOOD BY AUTOMATED COUNT: 14.1 % (ref 11–15)
ERYTHROCYTE [SEDIMENTATION RATE] IN BLOOD: 9 MM/HR
FASTING STATUS PATIENT QL REPORTED: YES
GFR SERPLBLD BASED ON 1.73 SQ M-ARVRAT: 58 ML/MIN/1.73M2 (ref 60–?)
GLOBULIN PLAS-MCNC: 3.1 G/DL (ref 2.8–4.4)
GLUCOSE BLD-MCNC: 86 MG/DL (ref 70–99)
GLUCOSE UR-MCNC: NEGATIVE MG/DL
HCT VFR BLD AUTO: 34.7 %
HGB BLD-MCNC: 10.8 G/DL
HGB UR QL STRIP.AUTO: NEGATIVE
IMM GRANULOCYTES # BLD AUTO: 0.01 X10(3) UL (ref 0–1)
IMM GRANULOCYTES NFR BLD: 0.2 %
KETONES UR-MCNC: NEGATIVE MG/DL
LEUKOCYTE ESTERASE UR QL STRIP.AUTO: NEGATIVE
LYMPHOCYTES # BLD AUTO: 2.18 X10(3) UL (ref 1–4)
LYMPHOCYTES NFR BLD AUTO: 41.6 %
MCH RBC QN AUTO: 33.3 PG (ref 26–34)
MCHC RBC AUTO-ENTMCNC: 31.1 G/DL (ref 31–37)
MCV RBC AUTO: 107.1 FL
MONOCYTES # BLD AUTO: 0.58 X10(3) UL (ref 0.1–1)
MONOCYTES NFR BLD AUTO: 11.1 %
NEUTROPHILS # BLD AUTO: 2.32 X10 (3) UL (ref 1.5–7.7)
NEUTROPHILS # BLD AUTO: 2.32 X10(3) UL (ref 1.5–7.7)
NEUTROPHILS NFR BLD AUTO: 44.2 %
NITRITE UR QL STRIP.AUTO: NEGATIVE
OSMOLALITY SERPL CALC.SUM OF ELEC: 297 MOSM/KG (ref 275–295)
PH UR: 5 [PH] (ref 5–8)
PLATELET # BLD AUTO: 217 10(3)UL (ref 150–450)
POTASSIUM SERPL-SCNC: 4 MMOL/L (ref 3.5–5.1)
PROT SERPL-MCNC: 6.9 G/DL (ref 6.4–8.2)
PROT UR-MCNC: NEGATIVE MG/DL
RBC # BLD AUTO: 3.24 X10(6)UL
SODIUM SERPL-SCNC: 142 MMOL/L (ref 136–145)
SP GR UR STRIP: 1.02 (ref 1–1.03)
UROBILINOGEN UR STRIP-ACNC: <2
VIT C UR-MCNC: NEGATIVE MG/DL
WBC # BLD AUTO: 5.2 X10(3) UL (ref 4–11)

## 2022-11-09 PROCEDURE — 87641 MR-STAPH DNA AMP PROBE: CPT

## 2022-11-09 PROCEDURE — 36415 COLL VENOUS BLD VENIPUNCTURE: CPT

## 2022-11-09 PROCEDURE — 81001 URINALYSIS AUTO W/SCOPE: CPT

## 2022-11-09 PROCEDURE — 85652 RBC SED RATE AUTOMATED: CPT

## 2022-11-09 PROCEDURE — 85025 COMPLETE CBC W/AUTO DIFF WBC: CPT

## 2022-11-09 PROCEDURE — 86140 C-REACTIVE PROTEIN: CPT

## 2022-11-09 PROCEDURE — 80053 COMPREHEN METABOLIC PANEL: CPT

## 2022-11-10 LAB — MRSA DNA SPEC QL NAA+PROBE: NEGATIVE

## 2022-11-15 ENCOUNTER — HOSPITAL ENCOUNTER (OUTPATIENT)
Dept: MRI IMAGING | Age: 67
Discharge: HOME | End: 2022-11-15
Attending: ORTHOPAEDIC SURGERY
Payer: MEDICAID

## 2022-11-15 DIAGNOSIS — M06.9 RHEUMATOID ARTHRITIS INVOLVING BOTH KNEES, UNSPECIFIED WHETHER RHEUMATOID FACTOR PRESENT (HCC): ICD-10-CM

## 2022-11-15 PROCEDURE — 73721 MRI JNT OF LWR EXTRE W/O DYE: CPT | Performed by: ORTHOPAEDIC SURGERY

## 2022-11-23 ENCOUNTER — TELEPHONE (OUTPATIENT)
Dept: ORTHOPEDICS CLINIC | Facility: CLINIC | Age: 67
End: 2022-11-23

## 2022-11-23 NOTE — TELEPHONE ENCOUNTER
emily from dr singleton's office called regarding surgical clearance. Given email address. Is it ok to send clearance.   Please call

## 2022-11-25 ENCOUNTER — LAB ENCOUNTER (OUTPATIENT)
Dept: LAB | Age: 67
End: 2022-11-25
Attending: ORTHOPAEDIC SURGERY
Payer: MEDICAID

## 2022-11-25 DIAGNOSIS — Z01.818 PREOPERATIVE TESTING: ICD-10-CM

## 2022-11-25 LAB
ANTIBODY SCREEN: NEGATIVE
RH BLOOD TYPE: POSITIVE

## 2022-11-25 PROCEDURE — 36415 COLL VENOUS BLD VENIPUNCTURE: CPT

## 2022-11-25 PROCEDURE — 86900 BLOOD TYPING SEROLOGIC ABO: CPT

## 2022-11-25 PROCEDURE — 86850 RBC ANTIBODY SCREEN: CPT

## 2022-11-25 PROCEDURE — 86901 BLOOD TYPING SEROLOGIC RH(D): CPT

## 2022-11-26 LAB — SARS-COV-2 RNA RESP QL NAA+PROBE: NOT DETECTED

## 2022-11-28 ENCOUNTER — ANESTHESIA (OUTPATIENT)
Dept: SURGERY | Facility: HOSPITAL | Age: 67
End: 2022-11-28
Payer: MEDICAID

## 2022-11-28 ENCOUNTER — HOSPITAL ENCOUNTER (OUTPATIENT)
Facility: HOSPITAL | Age: 67
Discharge: HOME HEALTH | End: 2022-11-29
Attending: ORTHOPAEDIC SURGERY | Admitting: ORTHOPAEDIC SURGERY
Payer: MEDICAID

## 2022-11-28 ENCOUNTER — ANESTHESIA EVENT (OUTPATIENT)
Dept: SURGERY | Facility: HOSPITAL | Age: 67
End: 2022-11-28
Payer: MEDICAID

## 2022-11-28 ENCOUNTER — APPOINTMENT (OUTPATIENT)
Dept: GENERAL RADIOLOGY | Facility: HOSPITAL | Age: 67
End: 2022-11-28
Attending: ORTHOPAEDIC SURGERY
Payer: MEDICAID

## 2022-11-28 DIAGNOSIS — Z01.818 PREOPERATIVE TESTING: Primary | ICD-10-CM

## 2022-11-28 DIAGNOSIS — M06.9 RHEUMATOID ARTHRITIS INVOLVING LEFT KNEE, UNSPECIFIED WHETHER RHEUMATOID FACTOR PRESENT (HCC): ICD-10-CM

## 2022-11-28 PROBLEM — M17.12 PRIMARY OSTEOARTHRITIS OF LEFT KNEE: Status: ACTIVE | Noted: 2022-11-28

## 2022-11-28 LAB
GLUCOSE BLDC GLUCOMTR-MCNC: 105 MG/DL (ref 70–99)
GLUCOSE BLDC GLUCOMTR-MCNC: 139 MG/DL (ref 70–99)
GLUCOSE BLDC GLUCOMTR-MCNC: 168 MG/DL (ref 70–99)
GLUCOSE BLDC GLUCOMTR-MCNC: 95 MG/DL (ref 70–99)

## 2022-11-28 PROCEDURE — 3074F SYST BP LT 130 MM HG: CPT | Performed by: HOSPITALIST

## 2022-11-28 PROCEDURE — 0SRD0J9 REPLACEMENT OF LEFT KNEE JOINT WITH SYNTHETIC SUBSTITUTE, CEMENTED, OPEN APPROACH: ICD-10-PCS | Performed by: ORTHOPAEDIC SURGERY

## 2022-11-28 PROCEDURE — 99225 SUBSEQUENT OBSERVATION CARE: CPT | Performed by: HOSPITALIST

## 2022-11-28 PROCEDURE — 3008F BODY MASS INDEX DOCD: CPT | Performed by: HOSPITALIST

## 2022-11-28 PROCEDURE — 73560 X-RAY EXAM OF KNEE 1 OR 2: CPT | Performed by: ORTHOPAEDIC SURGERY

## 2022-11-28 PROCEDURE — 3078F DIAST BP <80 MM HG: CPT | Performed by: HOSPITALIST

## 2022-11-28 PROCEDURE — 8E0YXBZ COMPUTER ASSISTED PROCEDURE OF LOWER EXTREMITY: ICD-10-PCS | Performed by: ORTHOPAEDIC SURGERY

## 2022-11-28 DEVICE — IMPLANTABLE DEVICE: Type: IMPLANTABLE DEVICE | Site: KNEE | Status: FUNCTIONAL

## 2022-11-28 DEVICE — PSN ALL POLY PAT PLY 35MM: Type: IMPLANTABLE DEVICE | Site: KNEE | Status: FUNCTIONAL

## 2022-11-28 DEVICE — REFOBACIN BC R 1X40 US: Type: IMPLANTABLE DEVICE | Site: KNEE | Status: FUNCTIONAL

## 2022-11-28 DEVICE — PSN TIB STM 5 DEG SZ D L: Type: IMPLANTABLE DEVICE | Site: KNEE | Status: FUNCTIONAL

## 2022-11-28 RX ORDER — ONDANSETRON 2 MG/ML
4 INJECTION INTRAMUSCULAR; INTRAVENOUS ONCE AS NEEDED
Status: ACTIVE | OUTPATIENT
Start: 2022-11-28 | End: 2022-11-28

## 2022-11-28 RX ORDER — DOCUSATE SODIUM 100 MG/1
100 CAPSULE, LIQUID FILLED ORAL 2 TIMES DAILY
Status: DISCONTINUED | OUTPATIENT
Start: 2022-11-28 | End: 2022-11-29

## 2022-11-28 RX ORDER — DIPHENHYDRAMINE HYDROCHLORIDE 50 MG/ML
12.5 INJECTION INTRAMUSCULAR; INTRAVENOUS EVERY 4 HOURS PRN
Status: DISCONTINUED | OUTPATIENT
Start: 2022-11-28 | End: 2022-11-28

## 2022-11-28 RX ORDER — OXYCODONE HCL 10 MG/1
10 TABLET, FILM COATED, EXTENDED RELEASE ORAL
Status: COMPLETED | OUTPATIENT
Start: 2022-11-28 | End: 2022-11-28

## 2022-11-28 RX ORDER — SODIUM CHLORIDE, SODIUM LACTATE, POTASSIUM CHLORIDE, CALCIUM CHLORIDE 600; 310; 30; 20 MG/100ML; MG/100ML; MG/100ML; MG/100ML
INJECTION, SOLUTION INTRAVENOUS CONTINUOUS
Status: DISCONTINUED | OUTPATIENT
Start: 2022-11-28 | End: 2022-11-28 | Stop reason: HOSPADM

## 2022-11-28 RX ORDER — ACETAMINOPHEN 325 MG/1
650 TABLET ORAL EVERY 6 HOURS PRN
Status: DISCONTINUED | OUTPATIENT
Start: 2022-11-28 | End: 2022-11-29

## 2022-11-28 RX ORDER — LIDOCAINE HYDROCHLORIDE 10 MG/ML
INJECTION, SOLUTION INFILTRATION; PERINEURAL
Status: COMPLETED | OUTPATIENT
Start: 2022-11-28 | End: 2022-11-28

## 2022-11-28 RX ORDER — MORPHINE SULFATE 4 MG/ML
4 INJECTION, SOLUTION INTRAMUSCULAR; INTRAVENOUS EVERY 10 MIN PRN
Status: DISCONTINUED | OUTPATIENT
Start: 2022-11-28 | End: 2022-11-28 | Stop reason: HOSPADM

## 2022-11-28 RX ORDER — LOSARTAN POTASSIUM 50 MG/1
50 TABLET ORAL DAILY
Status: DISCONTINUED | OUTPATIENT
Start: 2022-11-29 | End: 2022-11-29

## 2022-11-28 RX ORDER — BISACODYL 10 MG
10 SUPPOSITORY, RECTAL RECTAL
Status: DISCONTINUED | OUTPATIENT
Start: 2022-11-28 | End: 2022-11-29

## 2022-11-28 RX ORDER — BUPIVACAINE HYDROCHLORIDE 7.5 MG/ML
INJECTION, SOLUTION INTRASPINAL
Status: COMPLETED | OUTPATIENT
Start: 2022-11-28 | End: 2022-11-28

## 2022-11-28 RX ORDER — NALBUPHINE HCL 10 MG/ML
2.5 AMPUL (ML) INJECTION EVERY 4 HOURS PRN
Status: DISCONTINUED | OUTPATIENT
Start: 2022-11-28 | End: 2022-11-29

## 2022-11-28 RX ORDER — CEFAZOLIN SODIUM/WATER 2 G/20 ML
2 SYRINGE (ML) INTRAVENOUS
Status: COMPLETED | OUTPATIENT
Start: 2022-11-28 | End: 2022-11-28

## 2022-11-28 RX ORDER — DEXTROSE MONOHYDRATE 25 G/50ML
50 INJECTION, SOLUTION INTRAVENOUS
Status: DISCONTINUED | OUTPATIENT
Start: 2022-11-28 | End: 2022-11-28 | Stop reason: HOSPADM

## 2022-11-28 RX ORDER — LIDOCAINE HYDROCHLORIDE 10 MG/ML
INJECTION, SOLUTION EPIDURAL; INFILTRATION; INTRACAUDAL; PERINEURAL AS NEEDED
Status: DISCONTINUED | OUTPATIENT
Start: 2022-11-28 | End: 2022-11-28 | Stop reason: SURG

## 2022-11-28 RX ORDER — ACETAMINOPHEN 500 MG
1000 TABLET ORAL ONCE
Status: COMPLETED | OUTPATIENT
Start: 2022-11-28 | End: 2022-11-28

## 2022-11-28 RX ORDER — HYDROMORPHONE HYDROCHLORIDE 1 MG/ML
0.2 INJECTION, SOLUTION INTRAMUSCULAR; INTRAVENOUS; SUBCUTANEOUS EVERY 5 MIN PRN
Status: DISCONTINUED | OUTPATIENT
Start: 2022-11-28 | End: 2022-11-28 | Stop reason: HOSPADM

## 2022-11-28 RX ORDER — DEXAMETHASONE SODIUM PHOSPHATE 4 MG/ML
VIAL (ML) INJECTION AS NEEDED
Status: DISCONTINUED | OUTPATIENT
Start: 2022-11-28 | End: 2022-11-28 | Stop reason: SURG

## 2022-11-28 RX ORDER — HALOPERIDOL 5 MG/ML
0.5 INJECTION INTRAMUSCULAR ONCE AS NEEDED
Status: ACTIVE | OUTPATIENT
Start: 2022-11-28 | End: 2022-11-28

## 2022-11-28 RX ORDER — POLYETHYLENE GLYCOL 3350 17 G/17G
17 POWDER, FOR SOLUTION ORAL DAILY PRN
Status: DISCONTINUED | OUTPATIENT
Start: 2022-11-28 | End: 2022-11-29

## 2022-11-28 RX ORDER — ASPIRIN 325 MG
325 TABLET ORAL 2 TIMES DAILY
Status: DISCONTINUED | OUTPATIENT
Start: 2022-11-28 | End: 2022-11-29

## 2022-11-28 RX ORDER — SODIUM CHLORIDE, SODIUM LACTATE, POTASSIUM CHLORIDE, CALCIUM CHLORIDE 600; 310; 30; 20 MG/100ML; MG/100ML; MG/100ML; MG/100ML
INJECTION, SOLUTION INTRAVENOUS CONTINUOUS
Status: DISCONTINUED | OUTPATIENT
Start: 2022-11-28 | End: 2022-11-29

## 2022-11-28 RX ORDER — SENNOSIDES 8.6 MG
17.2 TABLET ORAL NIGHTLY
Status: DISCONTINUED | OUTPATIENT
Start: 2022-11-28 | End: 2022-11-29

## 2022-11-28 RX ORDER — NICOTINE POLACRILEX 4 MG
15 LOZENGE BUCCAL
Status: DISCONTINUED | OUTPATIENT
Start: 2022-11-28 | End: 2022-11-28 | Stop reason: HOSPADM

## 2022-11-28 RX ORDER — TRANEXAMIC ACID 10 MG/ML
INJECTION, SOLUTION INTRAVENOUS AS NEEDED
Status: DISCONTINUED | OUTPATIENT
Start: 2022-11-28 | End: 2022-11-28 | Stop reason: SURG

## 2022-11-28 RX ORDER — NALOXONE HYDROCHLORIDE 0.4 MG/ML
80 INJECTION, SOLUTION INTRAMUSCULAR; INTRAVENOUS; SUBCUTANEOUS AS NEEDED
Status: DISCONTINUED | OUTPATIENT
Start: 2022-11-28 | End: 2022-11-28 | Stop reason: HOSPADM

## 2022-11-28 RX ORDER — HYDROCODONE BITARTRATE AND ACETAMINOPHEN 7.5; 325 MG/1; MG/1
1 TABLET ORAL EVERY 4 HOURS PRN
Status: DISCONTINUED | OUTPATIENT
Start: 2022-11-28 | End: 2022-11-29

## 2022-11-28 RX ORDER — SODIUM PHOSPHATE, DIBASIC AND SODIUM PHOSPHATE, MONOBASIC 7; 19 G/133ML; G/133ML
1 ENEMA RECTAL ONCE AS NEEDED
Status: DISCONTINUED | OUTPATIENT
Start: 2022-11-28 | End: 2022-11-29

## 2022-11-28 RX ORDER — NICOTINE POLACRILEX 4 MG
15 LOZENGE BUCCAL
Status: DISCONTINUED | OUTPATIENT
Start: 2022-11-28 | End: 2022-11-29

## 2022-11-28 RX ORDER — CELECOXIB 200 MG/1
200 CAPSULE ORAL
Status: COMPLETED | OUTPATIENT
Start: 2022-11-28 | End: 2022-11-28

## 2022-11-28 RX ORDER — HYDROMORPHONE HYDROCHLORIDE 1 MG/ML
0.4 INJECTION, SOLUTION INTRAMUSCULAR; INTRAVENOUS; SUBCUTANEOUS
Status: DISCONTINUED | OUTPATIENT
Start: 2022-11-28 | End: 2022-11-29

## 2022-11-28 RX ORDER — DEXTROSE MONOHYDRATE 25 G/50ML
50 INJECTION, SOLUTION INTRAVENOUS
Status: DISCONTINUED | OUTPATIENT
Start: 2022-11-28 | End: 2022-11-29

## 2022-11-28 RX ORDER — ONDANSETRON 2 MG/ML
4 INJECTION INTRAMUSCULAR; INTRAVENOUS EVERY 6 HOURS PRN
Status: DISCONTINUED | OUTPATIENT
Start: 2022-11-28 | End: 2022-11-29

## 2022-11-28 RX ORDER — METOCLOPRAMIDE HYDROCHLORIDE 5 MG/ML
10 INJECTION INTRAMUSCULAR; INTRAVENOUS EVERY 8 HOURS PRN
Status: DISCONTINUED | OUTPATIENT
Start: 2022-11-28 | End: 2022-11-29

## 2022-11-28 RX ORDER — HYDROMORPHONE HYDROCHLORIDE 1 MG/ML
0.4 INJECTION, SOLUTION INTRAMUSCULAR; INTRAVENOUS; SUBCUTANEOUS EVERY 5 MIN PRN
Status: DISCONTINUED | OUTPATIENT
Start: 2022-11-28 | End: 2022-11-28 | Stop reason: HOSPADM

## 2022-11-28 RX ORDER — MIDAZOLAM HYDROCHLORIDE 1 MG/ML
INJECTION INTRAMUSCULAR; INTRAVENOUS AS NEEDED
Status: DISCONTINUED | OUTPATIENT
Start: 2022-11-28 | End: 2022-11-28 | Stop reason: SURG

## 2022-11-28 RX ORDER — OXYCODONE HCL 10 MG/1
10 TABLET, FILM COATED, EXTENDED RELEASE ORAL EVERY 12 HOURS
Status: DISCONTINUED | OUTPATIENT
Start: 2022-11-28 | End: 2022-11-29

## 2022-11-28 RX ORDER — CELECOXIB 200 MG/1
200 CAPSULE ORAL 2 TIMES DAILY
Status: DISCONTINUED | OUTPATIENT
Start: 2022-11-28 | End: 2022-11-29

## 2022-11-28 RX ORDER — MORPHINE SULFATE 10 MG/ML
6 INJECTION, SOLUTION INTRAMUSCULAR; INTRAVENOUS EVERY 10 MIN PRN
Status: DISCONTINUED | OUTPATIENT
Start: 2022-11-28 | End: 2022-11-28 | Stop reason: HOSPADM

## 2022-11-28 RX ORDER — DIPHENHYDRAMINE HCL 25 MG
25 CAPSULE ORAL EVERY 4 HOURS PRN
Status: DISCONTINUED | OUTPATIENT
Start: 2022-11-28 | End: 2022-11-29

## 2022-11-28 RX ORDER — CEFAZOLIN SODIUM/WATER 2 G/20 ML
2 SYRINGE (ML) INTRAVENOUS EVERY 8 HOURS
Status: COMPLETED | OUTPATIENT
Start: 2022-11-28 | End: 2022-11-29

## 2022-11-28 RX ORDER — PROCHLORPERAZINE EDISYLATE 5 MG/ML
5 INJECTION INTRAMUSCULAR; INTRAVENOUS ONCE AS NEEDED
Status: ACTIVE | OUTPATIENT
Start: 2022-11-28 | End: 2022-11-28

## 2022-11-28 RX ORDER — HYDROMORPHONE HYDROCHLORIDE 1 MG/ML
0.6 INJECTION, SOLUTION INTRAMUSCULAR; INTRAVENOUS; SUBCUTANEOUS
Status: DISCONTINUED | OUTPATIENT
Start: 2022-11-28 | End: 2022-11-29

## 2022-11-28 RX ORDER — NICOTINE POLACRILEX 4 MG
30 LOZENGE BUCCAL
Status: DISCONTINUED | OUTPATIENT
Start: 2022-11-28 | End: 2022-11-28 | Stop reason: HOSPADM

## 2022-11-28 RX ORDER — ONDANSETRON 2 MG/ML
4 INJECTION INTRAMUSCULAR; INTRAVENOUS EVERY 6 HOURS PRN
Status: DISCONTINUED | OUTPATIENT
Start: 2022-11-28 | End: 2022-11-28 | Stop reason: HOSPADM

## 2022-11-28 RX ORDER — METOCLOPRAMIDE HYDROCHLORIDE 5 MG/ML
10 INJECTION INTRAMUSCULAR; INTRAVENOUS EVERY 8 HOURS PRN
Status: DISCONTINUED | OUTPATIENT
Start: 2022-11-28 | End: 2022-11-28 | Stop reason: HOSPADM

## 2022-11-28 RX ORDER — HYDROCODONE BITARTRATE AND ACETAMINOPHEN 7.5; 325 MG/1; MG/1
1 TABLET ORAL EVERY 6 HOURS PRN
Status: DISCONTINUED | OUTPATIENT
Start: 2022-11-28 | End: 2022-11-29

## 2022-11-28 RX ORDER — MORPHINE SULFATE 1 MG/ML
INJECTION, SOLUTION EPIDURAL; INTRATHECAL; INTRAVENOUS
Status: COMPLETED | OUTPATIENT
Start: 2022-11-28 | End: 2022-11-28

## 2022-11-28 RX ORDER — NICOTINE POLACRILEX 4 MG
30 LOZENGE BUCCAL
Status: DISCONTINUED | OUTPATIENT
Start: 2022-11-28 | End: 2022-11-29

## 2022-11-28 RX ORDER — ASPIRIN 325 MG
325 TABLET, DELAYED RELEASE (ENTERIC COATED) ORAL ONCE
Status: COMPLETED | OUTPATIENT
Start: 2022-11-28 | End: 2022-11-28

## 2022-11-28 RX ORDER — MORPHINE SULFATE 4 MG/ML
2 INJECTION, SOLUTION INTRAMUSCULAR; INTRAVENOUS EVERY 10 MIN PRN
Status: DISCONTINUED | OUTPATIENT
Start: 2022-11-28 | End: 2022-11-28 | Stop reason: HOSPADM

## 2022-11-28 RX ORDER — HYDROCODONE BITARTRATE AND ACETAMINOPHEN 7.5; 325 MG/1; MG/1
2 TABLET ORAL EVERY 6 HOURS PRN
Status: DISCONTINUED | OUTPATIENT
Start: 2022-11-28 | End: 2022-11-29

## 2022-11-28 RX ORDER — ONDANSETRON 2 MG/ML
INJECTION INTRAMUSCULAR; INTRAVENOUS AS NEEDED
Status: DISCONTINUED | OUTPATIENT
Start: 2022-11-28 | End: 2022-11-28 | Stop reason: SURG

## 2022-11-28 RX ORDER — DIPHENHYDRAMINE HYDROCHLORIDE 50 MG/ML
12.5 INJECTION INTRAMUSCULAR; INTRAVENOUS EVERY 4 HOURS PRN
Status: DISCONTINUED | OUTPATIENT
Start: 2022-11-28 | End: 2022-11-29

## 2022-11-28 RX ORDER — NALOXONE HYDROCHLORIDE 0.4 MG/ML
0.08 INJECTION, SOLUTION INTRAMUSCULAR; INTRAVENOUS; SUBCUTANEOUS
Status: DISCONTINUED | OUTPATIENT
Start: 2022-11-28 | End: 2022-11-29

## 2022-11-28 RX ORDER — HYDROMORPHONE HYDROCHLORIDE 1 MG/ML
0.6 INJECTION, SOLUTION INTRAMUSCULAR; INTRAVENOUS; SUBCUTANEOUS EVERY 5 MIN PRN
Status: DISCONTINUED | OUTPATIENT
Start: 2022-11-28 | End: 2022-11-28 | Stop reason: HOSPADM

## 2022-11-28 RX ORDER — FOLIC ACID 1 MG/1
1 TABLET ORAL DAILY
Status: DISCONTINUED | OUTPATIENT
Start: 2022-11-28 | End: 2022-11-29

## 2022-11-28 RX ADMIN — DEXAMETHASONE SODIUM PHOSPHATE 4 MG: 4 MG/ML VIAL (ML) INJECTION at 12:53:00

## 2022-11-28 RX ADMIN — ONDANSETRON 4 MG: 2 INJECTION INTRAMUSCULAR; INTRAVENOUS at 12:53:00

## 2022-11-28 RX ADMIN — BUPIVACAINE HYDROCHLORIDE 1.5 ML: 7.5 INJECTION, SOLUTION INTRASPINAL at 12:47:00

## 2022-11-28 RX ADMIN — MIDAZOLAM HYDROCHLORIDE 2 MG: 1 INJECTION INTRAMUSCULAR; INTRAVENOUS at 12:45:00

## 2022-11-28 RX ADMIN — SODIUM CHLORIDE, SODIUM LACTATE, POTASSIUM CHLORIDE, CALCIUM CHLORIDE: 600; 310; 30; 20 INJECTION, SOLUTION INTRAVENOUS at 14:34:00

## 2022-11-28 RX ADMIN — LIDOCAINE HYDROCHLORIDE 50 MG: 10 INJECTION, SOLUTION EPIDURAL; INFILTRATION; INTRACAUDAL; PERINEURAL at 12:52:00

## 2022-11-28 RX ADMIN — TRANEXAMIC ACID 1000 MG: 10 INJECTION, SOLUTION INTRAVENOUS at 13:01:00

## 2022-11-28 RX ADMIN — CEFAZOLIN SODIUM/WATER 2 G: 2 G/20 ML SYRINGE (ML) INTRAVENOUS at 12:54:00

## 2022-11-28 RX ADMIN — MORPHINE SULFATE 0.3 MG: 1 INJECTION, SOLUTION EPIDURAL; INTRATHECAL; INTRAVENOUS at 12:47:00

## 2022-11-28 RX ADMIN — LIDOCAINE HYDROCHLORIDE 2 ML: 10 INJECTION, SOLUTION INFILTRATION; PERINEURAL at 12:32:00

## 2022-11-28 NOTE — OPERATIVE REPORT
Operative Note    Patient Name: Gerard Jackson    Preoperative Diagnosis: Rheumatoid arthritis involving left knee, unspecified whether rheumatoid factor present ([facility] [M06.9]    Postoperative Diagnosis: Rheumatoid arthritis involving left knee, unspecified whether rheumatoid factor present ([address] [M06.9]    Primary Surgeon: Nikolay Fuentes MD     Assistant: Vesna Ryder    Procedures: L tka with PSI, preoperative computer navigation    Surgical Findings: above    Anesthesia: Spinal    Complications: none    Specimen: L knee bone and soft tissue to pathology    Drains: hemovac, miranda    Condition: stable to RR    Estimated Blood Loss: 100cc    Nikolay Fuentes MD

## 2022-11-28 NOTE — ANESTHESIA POSTPROCEDURE EVALUATION
Patient: Miles Andersen    Procedure Summary     Date: 11/28/22 Room / Location: [address] [place] [address] MAIN OR 10 / [address] [place] [address] MAIN OR    Anesthesia Start: 5358 Anesthesia Stop:     Procedure: Left total knee arthroplasty (Left: Knee) Diagnosis:       Rheumatoid arthritis involving left knee, unspecified whether rheumatoid factor present ([facility])      (Rheumatoid arthritis involving left knee, unspecified whether rheumatoid factor present ([facility] [M06.9])    Surgeons: Peggy Quinones MD Anesthesiologist: Kourtney Quijano MD    Anesthesia Type: spinal ASA Status: 3          Anesthesia Type: spinal    Vitals Value Taken Time   /88 11/28/22 1447   Temp 97.8 11/28/22 1447   Pulse 66 11/28/22 1447   Resp 13 11/28/22 1447   SpO2 95 % 11/28/22 1447   Vitals shown include unvalidated device data.     [address] [place] [address] AN Post Evaluation:   Patient Evaluated in PACU  Patient Participation: complete - patient participated  Level of Consciousness: awake  Pain Score: 0  Pain Management: adequate  Airway Patency:patent  Dental exam unchanged from preop  Yes    Cardiovascular Status: acceptable  Respiratory Status: acceptable  Postoperative Hydration acceptable      Radha Aguilar MD  11/28/2022 2:47 PM

## 2022-11-29 VITALS
HEART RATE: 75 BPM | RESPIRATION RATE: 16 BRPM | HEIGHT: 63 IN | SYSTOLIC BLOOD PRESSURE: 120 MMHG | TEMPERATURE: 98 F | BODY MASS INDEX: 26.93 KG/M2 | WEIGHT: 152 LBS | OXYGEN SATURATION: 94 % | DIASTOLIC BLOOD PRESSURE: 78 MMHG

## 2022-11-29 DIAGNOSIS — Z47.89 ORTHOPEDIC AFTERCARE: Primary | ICD-10-CM

## 2022-11-29 LAB
ANION GAP SERPL CALC-SCNC: 6 MMOL/L (ref 0–18)
BUN BLD-MCNC: 22 MG/DL (ref 7–18)
BUN/CREAT SERPL: 23.7 (ref 10–20)
CALCIUM BLD-MCNC: 8.8 MG/DL (ref 8.5–10.1)
CHLORIDE SERPL-SCNC: 108 MMOL/L (ref 98–112)
CO2 SERPL-SCNC: 29 MMOL/L (ref 21–32)
CREAT BLD-MCNC: 0.93 MG/DL
EST. AVERAGE GLUCOSE BLD GHB EST-MCNC: 126 MG/DL (ref 68–126)
GFR SERPLBLD BASED ON 1.73 SQ M-ARVRAT: 68 ML/MIN/1.73M2 (ref 60–?)
GLUCOSE BLD-MCNC: 108 MG/DL (ref 70–99)
GLUCOSE BLDC GLUCOMTR-MCNC: 127 MG/DL (ref 70–99)
HBA1C MFR BLD: 6 % (ref ?–5.7)
HCT VFR BLD AUTO: 30 %
HGB BLD-MCNC: 9.6 G/DL
OSMOLALITY SERPL CALC.SUM OF ELEC: 300 MOSM/KG (ref 275–295)
POTASSIUM SERPL-SCNC: 4.1 MMOL/L (ref 3.5–5.1)
SODIUM SERPL-SCNC: 143 MMOL/L (ref 136–145)

## 2022-11-29 PROCEDURE — 99213 OFFICE O/P EST LOW 20 MIN: CPT | Performed by: HOSPITALIST

## 2022-11-29 RX ORDER — ASPIRIN 325 MG
325 TABLET ORAL 2 TIMES DAILY
Qty: 28 TABLET | Refills: 0 | Status: SHIPPED | OUTPATIENT
Start: 2022-11-29

## 2022-11-29 RX ORDER — CELECOXIB 200 MG/1
200 CAPSULE ORAL DAILY
Qty: 30 CAPSULE | Refills: 0 | Status: SHIPPED | OUTPATIENT
Start: 2022-11-29

## 2022-11-29 RX ORDER — POLYETHYLENE GLYCOL 3350 17 G/17G
17 POWDER, FOR SOLUTION ORAL DAILY PRN
Qty: 24 EACH | Refills: 0 | Status: SHIPPED | OUTPATIENT
Start: 2022-11-29

## 2022-11-29 RX ORDER — HYDROCODONE BITARTRATE AND ACETAMINOPHEN 7.5; 325 MG/1; MG/1
1 TABLET ORAL EVERY 4 HOURS PRN
Qty: 30 TABLET | Refills: 0 | Status: SHIPPED | OUTPATIENT
Start: 2022-11-29

## 2022-11-29 NOTE — CM/SW NOTE
Per pt she lives w/dtr but will dc to son's house. Pt provided address:    Richard Torrez ( son)  [address] Otoniel Subramanian 42 agrees w/HH RN, PT.    aidin referrals w/face to face order uploaded in aidin. CM to f/u with [facility] list for pt choice. CM/SW to remain available for support and/or discharge planning.     Jonathan Marie RN, [facility]    Ext.  [phone]

## 2022-11-29 NOTE — CM/SW NOTE
CM reviewed ref, no accepting [place] [phone] agency at this time. CM extended deadline and added [facility] as they have a hx with pt 6 mos ago per aidin. Cm notified RN/PT/OT of above. Requested RN secure a script for outpt PT as a back up plan if no [place] [phone] agency will accept. 1150  Only one available [place] [phone] agency. CM spoke with son to provide choice. He is agreeable. CM notified RN of above and added info to pts AVS    Plan  Home w/ son and [address] worker/ to remain available for support and/or discharge planning.      Yadira Guzman RN    Ext [phone]

## 2022-11-29 NOTE — DISCHARGE INSTRUCTIONS
Norco for pain. Celebrex for pain/inflammation. Aspirin twice daily for 2 weeks for blood clot prevention. May remove dressing and place new mepilex over incision in 3 days. Keep incision clean and dry. Ice and elevate knee. Weight bear as tolerated. Use walker/crutches for comfort. Follow up with Dr. Jean-Pierre Marquez in 2 weeks [phone]     Pain: Take pain medication as prescribed by your surgeon. If your pain is not controlled contact your surgeon for changes in your pain medication. Note constipation is one of the main side effects of pain medication. Take stool softeners, drink plenty of fluids and walk to help alleviate constipation. Risk for Infection: Monitor your incisional site daily for signs and symptoms of infection. These include increased redness/tenderness at the incisional site, purulent odorous drainage, fever of 100.5 or greater. If you have a fever use your incentive spirometer 10 times and re-check your temperature. If you still have a fever report this to your surgeon. Continue to use your incentive spirometer to help prevent pneumonia. Risk for Bleeding-You are on a blood thinner to prevent post-operative blood clots. Use a soft bristled tooth brush and electric razor while on the blood thinner. Monitor for signs and symptoms of increased bleeding which include nose bleeds that do not cease, excessive bruising, blood in stool or urine. If these occur contact your surgeon. Change in Mobility-Increase activity as ordered by your surgeon. Use walker, rise from sitting and lying positions slowly, keep pathways clear and well lit.     Home Healthcare:  [facility].     [id number] [place] [address], [facility], [address]  Phone: [phone]  Fax: [phone]

## 2022-11-29 NOTE — PHYSICAL THERAPY NOTE
PT order received and chart review complete. Pt was seen sleeping in bed. family present in room. When awoken by family, pt requested follow up from PT tomorrow due to feeling nauseous and sleepy at this time. Will follow up tomorrow AM for PT evaluation.

## 2022-12-14 ENCOUNTER — OFFICE VISIT (OUTPATIENT)
Dept: ORTHOPEDICS CLINIC | Facility: CLINIC | Age: 67
End: 2022-12-14
Payer: MEDICAID

## 2022-12-14 ENCOUNTER — TELEPHONE (OUTPATIENT)
Dept: ORTHOPEDICS CLINIC | Facility: CLINIC | Age: 67
End: 2022-12-14

## 2022-12-14 ENCOUNTER — HOSPITAL ENCOUNTER (OUTPATIENT)
Dept: GENERAL RADIOLOGY | Facility: HOSPITAL | Age: 67
Discharge: HOME | End: 2022-12-14
Attending: ORTHOPAEDIC SURGERY
Payer: MEDICAID

## 2022-12-14 DIAGNOSIS — Z47.89 ORTHOPEDIC AFTERCARE: ICD-10-CM

## 2022-12-14 DIAGNOSIS — Z47.89 ORTHOPEDIC AFTERCARE: Primary | ICD-10-CM

## 2022-12-14 PROCEDURE — 99024 POSTOP FOLLOW-UP VISIT: CPT | Performed by: ORTHOPAEDIC SURGERY

## 2022-12-14 PROCEDURE — 1111F DSCHRG MED/CURRENT MED MERGE: CPT | Performed by: ORTHOPAEDIC SURGERY

## 2022-12-14 PROCEDURE — 73562 X-RAY EXAM OF KNEE 3: CPT | Performed by: ORTHOPAEDIC SURGERY

## 2022-12-14 RX ORDER — AMITRIPTYLINE HYDROCHLORIDE 10 MG/1
10 TABLET, FILM COATED ORAL NIGHTLY
COMMUNITY
Start: 2022-10-07

## 2022-12-14 RX ORDER — GABAPENTIN 300 MG/1
300 CAPSULE ORAL 3 TIMES DAILY
COMMUNITY
Start: 2022-08-30

## 2022-12-14 RX ORDER — TRAMADOL HYDROCHLORIDE 50 MG/1
50 TABLET ORAL EVERY 6 HOURS PRN
Qty: 20 TABLET | Refills: 0 | Status: SHIPPED | OUTPATIENT
Start: 2022-12-14

## 2022-12-14 NOTE — TELEPHONE ENCOUNTER
Maria R/pharm calling regarding rx tramadol that has interaction with rx amitriptyline. [phone]

## 2022-12-15 RX ORDER — OXYCODONE HYDROCHLORIDE AND ACETAMINOPHEN 5; 325 MG/1; MG/1
1 TABLET ORAL EVERY 6 HOURS PRN
Qty: 20 TABLET | Refills: 0 | Status: SHIPPED | OUTPATIENT
Start: 2022-12-15

## 2022-12-15 NOTE — TELEPHONE ENCOUNTER
This was ordered by [facility]. Please notify her as well. I will order percocet for now. Please advise pt to wean off opioids in next 5-7 days.

## 2022-12-16 NOTE — TELEPHONE ENCOUNTER
Through the  patient told not to take Tramadol and do not fill it. It reacts with another drug she is on. Doctor sent another prescription of percocet to pharmacy but she need to wean off in the next week. Possibly use ibuprofen in between doses to avoid use of percocet.  She had no questions and has an appointment in January

## 2022-12-22 ENCOUNTER — OFFICE VISIT (OUTPATIENT)
Dept: PHYSICAL THERAPY | Age: 67
End: 2022-12-22
Payer: MEDICAID

## 2022-12-22 DIAGNOSIS — Z47.89 ORTHOPEDIC AFTERCARE: ICD-10-CM

## 2022-12-22 PROCEDURE — 97140 MANUAL THERAPY 1/> REGIONS: CPT | Performed by: PHYSICAL THERAPIST

## 2022-12-22 PROCEDURE — 97161 PT EVAL LOW COMPLEX 20 MIN: CPT | Performed by: PHYSICAL THERAPIST

## 2022-12-22 PROCEDURE — 97110 THERAPEUTIC EXERCISES: CPT | Performed by: PHYSICAL THERAPIST

## 2022-12-27 ENCOUNTER — OFFICE VISIT (OUTPATIENT)
Dept: PHYSICAL THERAPY | Age: 67
End: 2022-12-27
Payer: MEDICAID

## 2022-12-27 PROCEDURE — 97140 MANUAL THERAPY 1/> REGIONS: CPT | Performed by: PHYSICAL THERAPIST

## 2022-12-27 PROCEDURE — 97110 THERAPEUTIC EXERCISES: CPT | Performed by: PHYSICAL THERAPIST

## 2022-12-29 ENCOUNTER — OFFICE VISIT (OUTPATIENT)
Dept: PHYSICAL THERAPY | Age: 67
End: 2022-12-29
Payer: MEDICAID

## 2022-12-29 PROCEDURE — 97110 THERAPEUTIC EXERCISES: CPT | Performed by: PHYSICAL THERAPIST

## 2022-12-29 PROCEDURE — 97140 MANUAL THERAPY 1/> REGIONS: CPT | Performed by: PHYSICAL THERAPIST

## 2023-01-04 ENCOUNTER — OFFICE VISIT (OUTPATIENT)
Dept: PHYSICAL THERAPY | Age: 68
End: 2023-01-04
Payer: MEDICAID

## 2023-01-04 PROCEDURE — 97140 MANUAL THERAPY 1/> REGIONS: CPT | Performed by: PHYSICAL THERAPIST

## 2023-01-04 PROCEDURE — 97110 THERAPEUTIC EXERCISES: CPT | Performed by: PHYSICAL THERAPIST

## 2023-01-04 RX ORDER — CELECOXIB 200 MG/1
CAPSULE ORAL
Qty: 30 CAPSULE | Refills: 0 | Status: SHIPPED | OUTPATIENT
Start: 2023-01-04

## 2023-01-04 NOTE — TELEPHONE ENCOUNTER
Approved. Spoke to mother re: normal/improved test results. ESR significantly improved at 37. Mother had no questions. Peds ID nurse to call to schedule follow-up in 2 months.    Kassandra Coleman MD

## 2023-01-04 NOTE — TELEPHONE ENCOUNTER
Celecoxib script pending for 30 day tablets 0 refills to sign if appropriate.      Last refilt: 11/29/22 fro 30 tablets 0 refills    Last office visit: 12/14/22

## 2023-01-09 ENCOUNTER — LAB ENCOUNTER (OUTPATIENT)
Dept: LAB | Facility: HOSPITAL | Age: 68
End: 2023-01-09
Attending: INTERNAL MEDICINE
Payer: MEDICAID

## 2023-01-09 ENCOUNTER — OFFICE VISIT (OUTPATIENT)
Dept: RHEUMATOLOGY | Facility: CLINIC | Age: 68
End: 2023-01-09
Payer: MEDICAID

## 2023-01-09 VITALS
HEART RATE: 80 BPM | WEIGHT: 145.5 LBS | BODY MASS INDEX: 25.78 KG/M2 | HEIGHT: 63 IN | SYSTOLIC BLOOD PRESSURE: 148 MMHG | DIASTOLIC BLOOD PRESSURE: 97 MMHG | RESPIRATION RATE: 18 BRPM

## 2023-01-09 DIAGNOSIS — Z51.81 THERAPEUTIC DRUG MONITORING: ICD-10-CM

## 2023-01-09 DIAGNOSIS — M05.9 SEROPOSITIVE RHEUMATOID ARTHRITIS (HCC): ICD-10-CM

## 2023-01-09 DIAGNOSIS — M25.562 CHRONIC PAIN OF BOTH KNEES: ICD-10-CM

## 2023-01-09 DIAGNOSIS — G89.29 CHRONIC PAIN OF BOTH KNEES: ICD-10-CM

## 2023-01-09 DIAGNOSIS — M25.561 CHRONIC PAIN OF BOTH KNEES: ICD-10-CM

## 2023-01-09 DIAGNOSIS — M05.9 SEROPOSITIVE RHEUMATOID ARTHRITIS (HCC): Primary | ICD-10-CM

## 2023-01-09 LAB
ALBUMIN SERPL-MCNC: 3.7 G/DL (ref 3.4–5)
ALT SERPL-CCNC: 19 U/L
AST SERPL-CCNC: 17 U/L (ref 15–37)
BASOPHILS # BLD AUTO: 0.02 X10(3) UL (ref 0–0.2)
BASOPHILS NFR BLD AUTO: 0.4 %
CREAT BLD-MCNC: 1.12 MG/DL
CRP SERPL-MCNC: <0.29 MG/DL (ref ?–0.3)
DEPRECATED RDW RBC AUTO: 54.3 FL (ref 35.1–46.3)
EOSINOPHIL # BLD AUTO: 0.12 X10(3) UL (ref 0–0.7)
EOSINOPHIL NFR BLD AUTO: 2.4 %
ERYTHROCYTE [DISTWIDTH] IN BLOOD BY AUTOMATED COUNT: 14.2 % (ref 11–15)
ERYTHROCYTE [SEDIMENTATION RATE] IN BLOOD: 19 MM/HR
GFR SERPLBLD BASED ON 1.73 SQ M-ARVRAT: 54 ML/MIN/1.73M2 (ref 60–?)
HCT VFR BLD AUTO: 33.5 %
HGB BLD-MCNC: 10.6 G/DL
IMM GRANULOCYTES # BLD AUTO: 0.01 X10(3) UL (ref 0–1)
IMM GRANULOCYTES NFR BLD: 0.2 %
LYMPHOCYTES # BLD AUTO: 1.49 X10(3) UL (ref 1–4)
LYMPHOCYTES NFR BLD AUTO: 29.8 %
MCH RBC QN AUTO: 33 PG (ref 26–34)
MCHC RBC AUTO-ENTMCNC: 31.6 G/DL (ref 31–37)
MCV RBC AUTO: 104.4 FL
MONOCYTES # BLD AUTO: 0.48 X10(3) UL (ref 0.1–1)
MONOCYTES NFR BLD AUTO: 9.6 %
NEUTROPHILS # BLD AUTO: 2.88 X10 (3) UL (ref 1.5–7.7)
NEUTROPHILS # BLD AUTO: 2.88 X10(3) UL (ref 1.5–7.7)
NEUTROPHILS NFR BLD AUTO: 57.6 %
PLATELET # BLD AUTO: 189 10(3)UL (ref 150–450)
RBC # BLD AUTO: 3.21 X10(6)UL
WBC # BLD AUTO: 5 X10(3) UL (ref 4–11)

## 2023-01-09 PROCEDURE — 3080F DIAST BP >= 90 MM HG: CPT | Performed by: INTERNAL MEDICINE

## 2023-01-09 PROCEDURE — 86140 C-REACTIVE PROTEIN: CPT

## 2023-01-09 PROCEDURE — 82565 ASSAY OF CREATININE: CPT

## 2023-01-09 PROCEDURE — 85652 RBC SED RATE AUTOMATED: CPT

## 2023-01-09 PROCEDURE — 82040 ASSAY OF SERUM ALBUMIN: CPT

## 2023-01-09 PROCEDURE — 3008F BODY MASS INDEX DOCD: CPT | Performed by: INTERNAL MEDICINE

## 2023-01-09 PROCEDURE — 85025 COMPLETE CBC W/AUTO DIFF WBC: CPT

## 2023-01-09 PROCEDURE — 84450 TRANSFERASE (AST) (SGOT): CPT

## 2023-01-09 PROCEDURE — 99214 OFFICE O/P EST MOD 30 MIN: CPT | Performed by: INTERNAL MEDICINE

## 2023-01-09 PROCEDURE — 36415 COLL VENOUS BLD VENIPUNCTURE: CPT

## 2023-01-09 PROCEDURE — 84460 ALANINE AMINO (ALT) (SGPT): CPT

## 2023-01-09 PROCEDURE — 3077F SYST BP >= 140 MM HG: CPT | Performed by: INTERNAL MEDICINE

## 2023-01-09 RX ORDER — METHYLPREDNISOLONE 4 MG/1
TABLET ORAL
Qty: 1 EACH | Refills: 0 | Status: SHIPPED | OUTPATIENT
Start: 2023-01-09

## 2023-01-09 NOTE — PATIENT INSTRUCTIONS
You were seen today for RA  Blood work today  Continue humira and methotrexate  Start the prednisone/medrol dose pack   See me in 3 mos April

## 2023-01-10 ENCOUNTER — OFFICE VISIT (OUTPATIENT)
Dept: PHYSICAL THERAPY | Age: 68
End: 2023-01-10
Payer: MEDICAID

## 2023-01-10 PROCEDURE — 97110 THERAPEUTIC EXERCISES: CPT | Performed by: PHYSICAL THERAPIST

## 2023-01-10 PROCEDURE — 97140 MANUAL THERAPY 1/> REGIONS: CPT | Performed by: PHYSICAL THERAPIST

## 2023-01-11 ENCOUNTER — TELEPHONE (OUTPATIENT)
Dept: RHEUMATOLOGY | Facility: CLINIC | Age: 68
End: 2023-01-11

## 2023-01-11 NOTE — TELEPHONE ENCOUNTER
If you can let patient know that I reviewed her blood work, kidney and liver tests are normal.  Inflammation markers are normal

## 2023-01-12 ENCOUNTER — OFFICE VISIT (OUTPATIENT)
Dept: PHYSICAL THERAPY | Age: 68
End: 2023-01-12
Payer: MEDICAID

## 2023-01-12 ENCOUNTER — OFFICE VISIT (OUTPATIENT)
Dept: ORTHOPEDICS CLINIC | Facility: CLINIC | Age: 68
End: 2023-01-12

## 2023-01-12 ENCOUNTER — HOSPITAL ENCOUNTER (OUTPATIENT)
Dept: GENERAL RADIOLOGY | Facility: HOSPITAL | Age: 68
Discharge: HOME | End: 2023-01-12
Attending: ORTHOPAEDIC SURGERY
Payer: MEDICAID

## 2023-01-12 DIAGNOSIS — Z47.89 ORTHOPEDIC AFTERCARE: ICD-10-CM

## 2023-01-12 DIAGNOSIS — M06.9 RHEUMATOID ARTHRITIS INVOLVING BOTH KNEES, UNSPECIFIED WHETHER RHEUMATOID FACTOR PRESENT (HCC): Primary | ICD-10-CM

## 2023-01-12 PROCEDURE — 73562 X-RAY EXAM OF KNEE 3: CPT | Performed by: ORTHOPAEDIC SURGERY

## 2023-01-12 PROCEDURE — 97110 THERAPEUTIC EXERCISES: CPT | Performed by: PHYSICAL THERAPIST

## 2023-01-12 PROCEDURE — 99024 POSTOP FOLLOW-UP VISIT: CPT | Performed by: ORTHOPAEDIC SURGERY

## 2023-01-13 NOTE — TELEPHONE ENCOUNTER
Called and spoke with patient in  [facility] Name and  was verified. Inform patient per Dr. Giovana Santoyo she reviewed her blood work, kidney and liver tests are normal.  Inflammation markers are normal. Patient verbalized understanding. Patient stated if  labs are normal can she explains why is she having swelling on B/L feet. In a scale from 1-10 the swelling of her feet is a 3 level she is have that swelling has come down a lot. Medication medrol pack has help her and only 1 tablet left to take for tomorrow. Please advise.

## 2023-01-17 ENCOUNTER — OFFICE VISIT (OUTPATIENT)
Dept: PHYSICAL THERAPY | Age: 68
End: 2023-01-17
Payer: MEDICAID

## 2023-01-17 PROCEDURE — 97110 THERAPEUTIC EXERCISES: CPT | Performed by: PHYSICAL THERAPIST

## 2023-01-18 ENCOUNTER — OFFICE VISIT (OUTPATIENT)
Dept: PHYSICAL THERAPY | Age: 68
End: 2023-01-18
Payer: MEDICAID

## 2023-01-18 PROCEDURE — 97110 THERAPEUTIC EXERCISES: CPT | Performed by: PHYSICAL THERAPIST

## 2023-01-24 ENCOUNTER — OFFICE VISIT (OUTPATIENT)
Dept: PHYSICAL THERAPY | Age: 68
End: 2023-01-24
Payer: MEDICAID

## 2023-01-24 PROCEDURE — 97110 THERAPEUTIC EXERCISES: CPT | Performed by: PHYSICAL THERAPIST

## 2023-01-26 ENCOUNTER — OFFICE VISIT (OUTPATIENT)
Dept: PHYSICAL THERAPY | Age: 68
End: 2023-01-26
Payer: MEDICAID

## 2023-01-26 PROCEDURE — 97110 THERAPEUTIC EXERCISES: CPT | Performed by: PHYSICAL THERAPIST

## 2023-01-31 ENCOUNTER — OFFICE VISIT (OUTPATIENT)
Dept: PHYSICAL THERAPY | Age: 68
End: 2023-01-31
Payer: MEDICAID

## 2023-01-31 PROCEDURE — 97110 THERAPEUTIC EXERCISES: CPT | Performed by: PHYSICAL THERAPIST

## 2023-02-02 ENCOUNTER — OFFICE VISIT (OUTPATIENT)
Dept: PHYSICAL THERAPY | Age: 68
End: 2023-02-02
Payer: MEDICAID

## 2023-02-02 PROCEDURE — 97110 THERAPEUTIC EXERCISES: CPT | Performed by: PHYSICAL THERAPIST

## 2023-02-02 PROCEDURE — 97530 THERAPEUTIC ACTIVITIES: CPT | Performed by: PHYSICAL THERAPIST

## 2023-02-07 ENCOUNTER — OFFICE VISIT (OUTPATIENT)
Dept: PHYSICAL THERAPY | Age: 68
End: 2023-02-07
Payer: MEDICAID

## 2023-02-07 PROCEDURE — 97110 THERAPEUTIC EXERCISES: CPT | Performed by: PHYSICAL THERAPIST

## 2023-02-07 PROCEDURE — 97530 THERAPEUTIC ACTIVITIES: CPT | Performed by: PHYSICAL THERAPIST

## 2023-02-09 ENCOUNTER — OFFICE VISIT (OUTPATIENT)
Dept: PHYSICAL THERAPY | Age: 68
End: 2023-02-09
Payer: MEDICAID

## 2023-02-09 PROCEDURE — 97110 THERAPEUTIC EXERCISES: CPT | Performed by: PHYSICAL THERAPIST

## 2023-02-09 PROCEDURE — 97530 THERAPEUTIC ACTIVITIES: CPT | Performed by: PHYSICAL THERAPIST

## 2023-02-14 ENCOUNTER — OFFICE VISIT (OUTPATIENT)
Dept: PHYSICAL THERAPY | Age: 68
End: 2023-02-14
Payer: MEDICAID

## 2023-02-14 PROCEDURE — 97110 THERAPEUTIC EXERCISES: CPT | Performed by: PHYSICAL THERAPIST

## 2023-02-16 ENCOUNTER — OFFICE VISIT (OUTPATIENT)
Dept: PHYSICAL THERAPY | Age: 68
End: 2023-02-16
Payer: MEDICAID

## 2023-02-16 PROCEDURE — 97530 THERAPEUTIC ACTIVITIES: CPT | Performed by: PHYSICAL THERAPIST

## 2023-02-16 PROCEDURE — 97110 THERAPEUTIC EXERCISES: CPT | Performed by: PHYSICAL THERAPIST

## 2023-02-21 ENCOUNTER — OFFICE VISIT (OUTPATIENT)
Dept: PHYSICAL THERAPY | Age: 68
End: 2023-02-21
Payer: MEDICAID

## 2023-02-21 PROCEDURE — 97530 THERAPEUTIC ACTIVITIES: CPT | Performed by: PHYSICAL THERAPIST

## 2023-02-21 PROCEDURE — 97110 THERAPEUTIC EXERCISES: CPT | Performed by: PHYSICAL THERAPIST

## 2023-02-23 ENCOUNTER — OFFICE VISIT (OUTPATIENT)
Dept: PHYSICAL THERAPY | Age: 68
End: 2023-02-23
Payer: MEDICAID

## 2023-02-23 ENCOUNTER — OFFICE VISIT (OUTPATIENT)
Dept: ORTHOPEDICS CLINIC | Facility: CLINIC | Age: 68
End: 2023-02-23

## 2023-02-23 ENCOUNTER — HOSPITAL ENCOUNTER (OUTPATIENT)
Dept: GENERAL RADIOLOGY | Facility: HOSPITAL | Age: 68
Discharge: HOME | End: 2023-02-23
Attending: ORTHOPAEDIC SURGERY
Payer: MEDICAID

## 2023-02-23 DIAGNOSIS — Z47.89 ORTHOPEDIC AFTERCARE: ICD-10-CM

## 2023-02-23 DIAGNOSIS — Z47.89 ORTHOPEDIC AFTERCARE: Primary | ICD-10-CM

## 2023-02-23 PROCEDURE — 73562 X-RAY EXAM OF KNEE 3: CPT | Performed by: ORTHOPAEDIC SURGERY

## 2023-02-23 PROCEDURE — 99024 POSTOP FOLLOW-UP VISIT: CPT | Performed by: ORTHOPAEDIC SURGERY

## 2023-02-23 PROCEDURE — 97110 THERAPEUTIC EXERCISES: CPT | Performed by: PHYSICAL THERAPIST

## 2023-02-27 ENCOUNTER — OFFICE VISIT (OUTPATIENT)
Dept: RHEUMATOLOGY | Facility: CLINIC | Age: 68
End: 2023-02-27

## 2023-02-27 ENCOUNTER — TELEPHONE (OUTPATIENT)
Dept: RHEUMATOLOGY | Facility: CLINIC | Age: 68
End: 2023-02-27

## 2023-02-27 VITALS
DIASTOLIC BLOOD PRESSURE: 91 MMHG | HEART RATE: 69 BPM | BODY MASS INDEX: 26.05 KG/M2 | WEIGHT: 147 LBS | HEIGHT: 63 IN | SYSTOLIC BLOOD PRESSURE: 148 MMHG

## 2023-02-27 DIAGNOSIS — Z51.81 THERAPEUTIC DRUG MONITORING: ICD-10-CM

## 2023-02-27 DIAGNOSIS — M05.9 SEROPOSITIVE RHEUMATOID ARTHRITIS (HCC): ICD-10-CM

## 2023-02-27 DIAGNOSIS — R76.12 POSITIVE QUANTIFERON-TB GOLD TEST: ICD-10-CM

## 2023-02-27 DIAGNOSIS — G89.29 CHRONIC PAIN OF BOTH KNEES: ICD-10-CM

## 2023-02-27 DIAGNOSIS — M25.562 CHRONIC PAIN OF BOTH KNEES: ICD-10-CM

## 2023-02-27 DIAGNOSIS — M25.561 CHRONIC PAIN OF BOTH KNEES: ICD-10-CM

## 2023-02-27 PROCEDURE — 3008F BODY MASS INDEX DOCD: CPT | Performed by: INTERNAL MEDICINE

## 2023-02-27 PROCEDURE — 99214 OFFICE O/P EST MOD 30 MIN: CPT | Performed by: INTERNAL MEDICINE

## 2023-02-27 PROCEDURE — 3077F SYST BP >= 140 MM HG: CPT | Performed by: INTERNAL MEDICINE

## 2023-02-27 PROCEDURE — 3080F DIAST BP >= 90 MM HG: CPT | Performed by: INTERNAL MEDICINE

## 2023-02-27 RX ORDER — METHYLPREDNISOLONE 4 MG/1
TABLET ORAL
Qty: 1 EACH | Refills: 0 | Status: SHIPPED | OUTPATIENT
Start: 2023-02-27

## 2023-02-27 RX ORDER — FOLIC ACID 1 MG/1
1 TABLET ORAL DAILY
Qty: 90 TABLET | Refills: 1 | Status: SHIPPED | OUTPATIENT
Start: 2023-02-27

## 2023-02-27 NOTE — TELEPHONE ENCOUNTER
Received call from pharmacy, looking to clarify instructions on Methotrexate.    Provided verbal.    Instructions    You were seen for RA  Increase methotrexate to 8 pills every 7 days   Take folic acid 1 pill a day  Continue humira every 2 weeks  Blood work in April/May  See me in May

## 2023-02-27 NOTE — PATIENT INSTRUCTIONS
You were seen for RA  Increase methotrexate to 8 pills every 7 days   Take folic acid 1 pill a day  Continue humira every 2 weeks  Blood work in April/May  See me in May

## 2023-03-29 ENCOUNTER — HOSPITAL ENCOUNTER (OUTPATIENT)
Dept: MAMMOGRAPHY | Age: 68
Discharge: HOME | End: 2023-03-29
Attending: FAMILY MEDICINE
Payer: MEDICAID

## 2023-03-29 DIAGNOSIS — Z12.31 VISIT FOR SCREENING MAMMOGRAM: ICD-10-CM

## 2023-03-29 PROCEDURE — 77063 BREAST TOMOSYNTHESIS BI: CPT | Performed by: FAMILY MEDICINE

## 2023-03-29 PROCEDURE — 77067 SCR MAMMO BI INCL CAD: CPT | Performed by: FAMILY MEDICINE

## 2023-05-19 DIAGNOSIS — M05.9 SEROPOSITIVE RHEUMATOID ARTHRITIS (HCC): ICD-10-CM

## 2023-05-19 DIAGNOSIS — R76.12 POSITIVE QUANTIFERON-TB GOLD TEST: ICD-10-CM

## 2023-05-19 DIAGNOSIS — G89.29 CHRONIC PAIN OF BOTH KNEES: ICD-10-CM

## 2023-05-19 DIAGNOSIS — M25.561 CHRONIC PAIN OF BOTH KNEES: ICD-10-CM

## 2023-05-19 DIAGNOSIS — Z51.81 THERAPEUTIC DRUG MONITORING: ICD-10-CM

## 2023-05-19 DIAGNOSIS — M25.562 CHRONIC PAIN OF BOTH KNEES: ICD-10-CM

## 2023-05-31 ENCOUNTER — TELEPHONE (OUTPATIENT)
Dept: RHEUMATOLOGY | Facility: CLINIC | Age: 68
End: 2023-05-31

## 2023-05-31 ENCOUNTER — OFFICE VISIT (OUTPATIENT)
Dept: RHEUMATOLOGY | Facility: CLINIC | Age: 68
End: 2023-05-31

## 2023-05-31 ENCOUNTER — LAB ENCOUNTER (OUTPATIENT)
Dept: LAB | Facility: HOSPITAL | Age: 68
End: 2023-05-31
Attending: INTERNAL MEDICINE
Payer: MEDICAID

## 2023-05-31 VITALS
DIASTOLIC BLOOD PRESSURE: 90 MMHG | BODY MASS INDEX: 26.22 KG/M2 | HEIGHT: 63 IN | SYSTOLIC BLOOD PRESSURE: 147 MMHG | HEART RATE: 71 BPM | WEIGHT: 148 LBS

## 2023-05-31 DIAGNOSIS — Z51.81 THERAPEUTIC DRUG MONITORING: ICD-10-CM

## 2023-05-31 DIAGNOSIS — M05.9 SEROPOSITIVE RHEUMATOID ARTHRITIS (HCC): Primary | ICD-10-CM

## 2023-05-31 DIAGNOSIS — M05.9 SEROPOSITIVE RHEUMATOID ARTHRITIS (HCC): ICD-10-CM

## 2023-05-31 LAB
ALBUMIN SERPL-MCNC: 3.4 G/DL (ref 3.4–5)
ALT SERPL-CCNC: 19 U/L
AST SERPL-CCNC: 16 U/L (ref 15–37)
BASOPHILS # BLD AUTO: 0.01 X10(3) UL (ref 0–0.2)
BASOPHILS NFR BLD AUTO: 0.2 %
CREAT BLD-MCNC: 1.07 MG/DL
CRP SERPL-MCNC: <0.29 MG/DL (ref ?–0.3)
DEPRECATED RDW RBC AUTO: 51.8 FL (ref 35.1–46.3)
EOSINOPHIL # BLD AUTO: 0.11 X10(3) UL (ref 0–0.7)
EOSINOPHIL NFR BLD AUTO: 2 %
ERYTHROCYTE [DISTWIDTH] IN BLOOD BY AUTOMATED COUNT: 13.8 % (ref 11–15)
ERYTHROCYTE [SEDIMENTATION RATE] IN BLOOD: 5 MM/HR
GFR SERPLBLD BASED ON 1.73 SQ M-ARVRAT: 57 ML/MIN/1.73M2 (ref 60–?)
HCT VFR BLD AUTO: 31.9 %
HGB BLD-MCNC: 10.2 G/DL
IMM GRANULOCYTES # BLD AUTO: 0.02 X10(3) UL (ref 0–1)
IMM GRANULOCYTES NFR BLD: 0.4 %
LYMPHOCYTES # BLD AUTO: 1.58 X10(3) UL (ref 1–4)
LYMPHOCYTES NFR BLD AUTO: 28.7 %
MCH RBC QN AUTO: 32.8 PG (ref 26–34)
MCHC RBC AUTO-ENTMCNC: 32 G/DL (ref 31–37)
MCV RBC AUTO: 102.6 FL
MONOCYTES # BLD AUTO: 0.44 X10(3) UL (ref 0.1–1)
MONOCYTES NFR BLD AUTO: 8 %
NEUTROPHILS # BLD AUTO: 3.35 X10 (3) UL (ref 1.5–7.7)
NEUTROPHILS # BLD AUTO: 3.35 X10(3) UL (ref 1.5–7.7)
NEUTROPHILS NFR BLD AUTO: 60.7 %
PLATELET # BLD AUTO: 199 10(3)UL (ref 150–450)
RBC # BLD AUTO: 3.11 X10(6)UL
WBC # BLD AUTO: 5.5 X10(3) UL (ref 4–11)

## 2023-05-31 PROCEDURE — 36415 COLL VENOUS BLD VENIPUNCTURE: CPT

## 2023-05-31 PROCEDURE — 3080F DIAST BP >= 90 MM HG: CPT | Performed by: INTERNAL MEDICINE

## 2023-05-31 PROCEDURE — 84460 ALANINE AMINO (ALT) (SGPT): CPT

## 2023-05-31 PROCEDURE — 85652 RBC SED RATE AUTOMATED: CPT

## 2023-05-31 PROCEDURE — 86140 C-REACTIVE PROTEIN: CPT

## 2023-05-31 PROCEDURE — 3077F SYST BP >= 140 MM HG: CPT | Performed by: INTERNAL MEDICINE

## 2023-05-31 PROCEDURE — 3008F BODY MASS INDEX DOCD: CPT | Performed by: INTERNAL MEDICINE

## 2023-05-31 PROCEDURE — 99214 OFFICE O/P EST MOD 30 MIN: CPT | Performed by: INTERNAL MEDICINE

## 2023-05-31 PROCEDURE — 82565 ASSAY OF CREATININE: CPT

## 2023-05-31 PROCEDURE — 84450 TRANSFERASE (AST) (SGOT): CPT

## 2023-05-31 PROCEDURE — 82040 ASSAY OF SERUM ALBUMIN: CPT

## 2023-05-31 PROCEDURE — 85025 COMPLETE CBC W/AUTO DIFF WBC: CPT

## 2023-05-31 RX ORDER — HYDROXYCHLOROQUINE SULFATE 200 MG/1
400 TABLET, FILM COATED ORAL DAILY
Qty: 120 TABLET | Refills: 0 | Status: SHIPPED | OUTPATIENT
Start: 2023-05-31

## 2023-05-31 NOTE — PATIENT INSTRUCTIONS
Were seen today for rheumatoid arthritis  Continue Humira and methotrexate  Plan to add hydroxychloroquine, take 1 pill daily for 2 weeks then increase to 2 pills daily  Watch out for any rashes, loose stools or nausea  You will have to get your eyes checked on this medication  Blood work today  See me in the next 2 months

## 2023-05-31 NOTE — TELEPHONE ENCOUNTER
If you can let patient know that I reviewed her blood work, normal kidney and liver test.  Normal inflammation markers.

## 2023-06-01 NOTE — TELEPHONE ENCOUNTER
Using the (Len) informed patient her labs are normal. Patients asking then why are her feet still hurting? Instructed her to continue medications the same and follow up in two months. Please advise.

## 2023-06-09 NOTE — TELEPHONE ENCOUNTER
Spoke to patient and relayed Dr. Preston Pedroza message as shown below. Patient verbalized understanding and had no further questions at this time.

## 2023-07-05 ENCOUNTER — LAB ENCOUNTER (OUTPATIENT)
Dept: LAB | Age: 68
End: 2023-07-05
Attending: FAMILY MEDICINE
Payer: MEDICAID

## 2023-07-05 DIAGNOSIS — Z00.00 ROUTINE GENERAL MEDICAL EXAMINATION AT A HEALTH CARE FACILITY: Primary | ICD-10-CM

## 2023-07-05 LAB
ALBUMIN SERPL-MCNC: 3.3 G/DL (ref 3.4–5)
ALBUMIN/GLOB SERPL: 0.9 {RATIO} (ref 1–2)
ALP LIVER SERPL-CCNC: 91 U/L
ALT SERPL-CCNC: 22 U/L
ANION GAP SERPL CALC-SCNC: 8 MMOL/L (ref 0–18)
AST SERPL-CCNC: 19 U/L (ref 15–37)
BASOPHILS # BLD AUTO: 0 X10(3) UL (ref 0–0.2)
BASOPHILS NFR BLD AUTO: 0 %
BILIRUB SERPL-MCNC: 0.4 MG/DL (ref 0.1–2)
BILIRUB UR QL STRIP.AUTO: NEGATIVE
BUN BLD-MCNC: 20 MG/DL (ref 7–18)
CALCIUM BLD-MCNC: 8.8 MG/DL (ref 8.5–10.1)
CHLORIDE SERPL-SCNC: 107 MMOL/L (ref 98–112)
CHOLEST SERPL-MCNC: 149 MG/DL (ref ?–200)
CLARITY UR REFRACT.AUTO: CLEAR
CO2 SERPL-SCNC: 27 MMOL/L (ref 21–32)
CREAT BLD-MCNC: 1.12 MG/DL
CREAT UR-SCNC: 85.1 MG/DL
EOSINOPHIL # BLD AUTO: 0.06 X10(3) UL (ref 0–0.7)
EOSINOPHIL NFR BLD AUTO: 1.5 %
ERYTHROCYTE [DISTWIDTH] IN BLOOD BY AUTOMATED COUNT: 13.1 %
EST. AVERAGE GLUCOSE BLD GHB EST-MCNC: 123 MG/DL (ref 68–126)
FASTING PATIENT LIPID ANSWER: YES
FASTING STATUS PATIENT QL REPORTED: YES
GFR SERPLBLD BASED ON 1.73 SQ M-ARVRAT: 54 ML/MIN/1.73M2 (ref 60–?)
GLOBULIN PLAS-MCNC: 3.5 G/DL (ref 2.8–4.4)
GLUCOSE BLD-MCNC: 96 MG/DL (ref 70–99)
GLUCOSE UR STRIP.AUTO-MCNC: NEGATIVE MG/DL
HBA1C MFR BLD: 5.9 % (ref ?–5.7)
HCT VFR BLD AUTO: 31.4 %
HDLC SERPL-MCNC: 76 MG/DL (ref 40–59)
HGB BLD-MCNC: 10.4 G/DL
IMM GRANULOCYTES # BLD AUTO: 0.01 X10(3) UL (ref 0–1)
IMM GRANULOCYTES NFR BLD: 0.2 %
KETONES UR STRIP.AUTO-MCNC: NEGATIVE MG/DL
LDLC SERPL CALC-MCNC: 54 MG/DL (ref ?–100)
LEUKOCYTE ESTERASE UR QL STRIP.AUTO: NEGATIVE
LYMPHOCYTES # BLD AUTO: 1.86 X10(3) UL (ref 1–4)
LYMPHOCYTES NFR BLD AUTO: 46 %
MCH RBC QN AUTO: 33.1 PG (ref 26–34)
MCHC RBC AUTO-ENTMCNC: 33.1 G/DL (ref 31–37)
MCV RBC AUTO: 100 FL
MICROALBUMIN UR-MCNC: <0.5 MG/DL
MONOCYTES # BLD AUTO: 0.27 X10(3) UL (ref 0.1–1)
MONOCYTES NFR BLD AUTO: 6.7 %
NEUTROPHILS # BLD AUTO: 1.84 X10 (3) UL (ref 1.5–7.7)
NEUTROPHILS # BLD AUTO: 1.84 X10(3) UL (ref 1.5–7.7)
NEUTROPHILS NFR BLD AUTO: 45.6 %
NITRITE UR QL STRIP.AUTO: NEGATIVE
NONHDLC SERPL-MCNC: 73 MG/DL (ref ?–130)
OSMOLALITY SERPL CALC.SUM OF ELEC: 296 MOSM/KG (ref 275–295)
PH UR STRIP.AUTO: 7 [PH] (ref 5–8)
PLATELET # BLD AUTO: 166 10(3)UL (ref 150–450)
POTASSIUM SERPL-SCNC: 4 MMOL/L (ref 3.5–5.1)
PROT SERPL-MCNC: 6.8 G/DL (ref 6.4–8.2)
PROT UR STRIP.AUTO-MCNC: NEGATIVE MG/DL
RBC # BLD AUTO: 3.14 X10(6)UL
RBC UR QL AUTO: NEGATIVE
SODIUM SERPL-SCNC: 142 MMOL/L (ref 136–145)
SP GR UR STRIP.AUTO: 1.01 (ref 1–1.03)
TRIGL SERPL-MCNC: 105 MG/DL (ref 30–149)
TSI SER-ACNC: 0.41 MIU/ML (ref 0.36–3.74)
UROBILINOGEN UR STRIP.AUTO-MCNC: <2 MG/DL
VLDLC SERPL CALC-MCNC: 15 MG/DL (ref 0–30)
WBC # BLD AUTO: 4 X10(3) UL (ref 4–11)

## 2023-07-05 PROCEDURE — 36415 COLL VENOUS BLD VENIPUNCTURE: CPT

## 2023-07-05 PROCEDURE — 84443 ASSAY THYROID STIM HORMONE: CPT

## 2023-07-05 PROCEDURE — 82570 ASSAY OF URINE CREATININE: CPT

## 2023-07-05 PROCEDURE — 80053 COMPREHEN METABOLIC PANEL: CPT

## 2023-07-05 PROCEDURE — 82043 UR ALBUMIN QUANTITATIVE: CPT

## 2023-07-05 PROCEDURE — 85025 COMPLETE CBC W/AUTO DIFF WBC: CPT

## 2023-07-05 PROCEDURE — 80061 LIPID PANEL: CPT

## 2023-07-05 PROCEDURE — 81003 URINALYSIS AUTO W/O SCOPE: CPT

## 2023-07-05 PROCEDURE — 83036 HEMOGLOBIN GLYCOSYLATED A1C: CPT

## 2023-07-31 ENCOUNTER — OFFICE VISIT (OUTPATIENT)
Dept: RHEUMATOLOGY | Facility: CLINIC | Age: 68
End: 2023-07-31

## 2023-07-31 ENCOUNTER — TELEPHONE (OUTPATIENT)
Dept: RHEUMATOLOGY | Facility: CLINIC | Age: 68
End: 2023-07-31

## 2023-07-31 VITALS
HEART RATE: 67 BPM | DIASTOLIC BLOOD PRESSURE: 90 MMHG | SYSTOLIC BLOOD PRESSURE: 150 MMHG | HEIGHT: 63 IN | WEIGHT: 153 LBS | BODY MASS INDEX: 27.11 KG/M2

## 2023-07-31 DIAGNOSIS — G89.29 CHRONIC PAIN OF BOTH KNEES: ICD-10-CM

## 2023-07-31 DIAGNOSIS — M25.562 CHRONIC PAIN OF BOTH KNEES: ICD-10-CM

## 2023-07-31 DIAGNOSIS — M25.561 CHRONIC PAIN OF BOTH KNEES: ICD-10-CM

## 2023-07-31 DIAGNOSIS — M05.9 SEROPOSITIVE RHEUMATOID ARTHRITIS (HCC): Primary | ICD-10-CM

## 2023-07-31 DIAGNOSIS — Z51.81 THERAPEUTIC DRUG MONITORING: ICD-10-CM

## 2023-07-31 PROCEDURE — 99214 OFFICE O/P EST MOD 30 MIN: CPT | Performed by: INTERNAL MEDICINE

## 2023-07-31 PROCEDURE — 3008F BODY MASS INDEX DOCD: CPT | Performed by: INTERNAL MEDICINE

## 2023-07-31 PROCEDURE — 3077F SYST BP >= 140 MM HG: CPT | Performed by: INTERNAL MEDICINE

## 2023-07-31 PROCEDURE — 3080F DIAST BP >= 90 MM HG: CPT | Performed by: INTERNAL MEDICINE

## 2023-07-31 NOTE — PATIENT INSTRUCTIONS
You were seen for joint and muscle pain  Continue Humira, methotrexate and Plaquenil for now  Plan to get you approved for Enbrel, once approved you will then stop the Humira  Enbrel is a once a week medication  Blood work in 3 to 4 months  See me in 3 to 4 months

## 2023-07-31 NOTE — TELEPHONE ENCOUNTER
Can we switch patient to Enbrel weekly. On Humira but is not working. PA will be needed.   It is for rheumatoid arthritis

## 2023-07-31 NOTE — PROGRESS NOTES
Morena Hastings is a 78year old female. HPI:   Patient presents with: Follow - Up  Rheumatoid Arthritis  Muscle Pain    I had the pleasure of seeing Morena Hastings on 7/31/2023 for follow up Seropositve RA (+RF and CCP). 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 She was then seen by rheumatologist Dr. Armando Raymond and diagnosed with RA. She is found to have elevated RF and inflammatory markers. Started on methotrexate 6 pills weekly and meloxicam 15 mg daily in the summer 2018  She states that the medications are not working. She also had her right shoulder and knee injected with steroids. She continues to complain of right shoulder, knee and ankle pain. She has to use a walker right now due to the pain. She also has intermittent swelling in her knee and ankle. 8/13/2021:  Presents for follow-up of seropositive RA  Currently on Humira every 2 weeks and methotrexate  Joints are doing very well. Reports some pain in her left elbow but no swelling  Continues to have bilateral knee pain. She has had cortisone injections and gel injections with minimal improvement. During her last visit she was given referral to orthopedic but has not seen them  Denies any other joint pain or swelling.   Recent blood work reviewed, inflammatory markers are normal    11/19/2021:  Presents for follow-up of seropositive RA  Currently on methotrexate 6 pills weekly  Has not been taking Humira for the past month  Since stopped taking Humira knee pain is better  XR L knee shows progressive severe OA of the medial compartment  XR R knee shows moderate narrowing of medial and lateral cmpts  Still has not seen orthopedic, have given her 2 referrals already  Has some pain and swelling in ankles otherwise no pain in hands, elbows or shoulders  Reports some lower back pain mostly on the R side no radiculopathy symptoms     2/18/2022:  Presents for follow-up of seropositive RA  Currently on methotrexate 6 pills weekly  She was seen by orthopedic recently and considering surgery for her knee. They ordered an MRI of her right knee. Now back on Humira but taking it weekly. Suppose to be on it every 2 weeks  Also on methotrexate 6 pills weekly  Continue to have a lot of pain in R knee  Also having some R sided back pain, no radiculopathy symptoms     6/4/2022:  Presents for follow-up of seropositive RA  Currently on Humira every 2 weeks and methotrexate 6 pills weekly  She had right knee replacement on 5/23  Has R knee pain but otherwise no other joint pain or swelling  Plans on having L knee replaced in the next 2-3 mos   Doing at home physical therapy    10/4/2022:  Presents for follow-up of seropositive RA  Currently on Humira every 2 weeks and methotrexate 6 pills weekly  R knee replaced in May 2022 and doing well  Going to have the Left replaced in November   Has been able to walk better after the knee replacement  No other joint pain but hands and fingers will get stuck at times, feels like they are cramping   Shoulders are good     1/9/2023  Presents for follow-up of seropositive RA  Currently on Humira every 2 weeks and methotrexate 6 pills weekly  Had L knee replacement 11/28 and doing well  Having more joint pain in hands, elbows. Some swelling in the ankles. Whole body aches.    Has been feeling really cold these days, no fevers or recent infection    2/27/2023:  Presents for follow-up of seropositive RA  Currently on Humira every 2 weeks and methotrexate 6 pills weekly  During last visit was having some pain and swelling in left ankle, medrol dose pack was given and helped but now pain and swelling in left ankle has returned. Has mild pain in wrists and elbows, no swelling. Some stiffness in hands, can make fist but not tight. Has some pain in right ankle too  Had b/l knee replacements and has some pain     5/31/2023:  Presents for follow-up of seropositive RA  Currently on Humira every 2 weeks and methotrexate 8 pills weekly  B/L knee's now replaced   Having some knee pain  Also hands feel inflamed and at times fingers get stuck. Able to close hand but not tight  Some pain in left elbow  Some pain in ankles    7/31/2023:  Presents for follow-up of seropositive RA  Currently on Humira every 2 weeks, methotrexate 8 pills weekly and  mg daily  During her last visit she was having some more pain in her hands, left elbow and ankles. 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 [address]     Visual impairment     glasses      Social Hx Reviewed   Family Hx Reviewed     Medications (Active prior to today's visit):  Current Outpatient Medications   Medication Sig Dispense Refill    hydroxychloroquine 200 MG Oral Tab Take 2 tablets (400 mg total) by mouth daily. 120 tablet 0    methotrexate 2.5 MG Oral Tab Take 8 tablets (20 mg total) by mouth once a week. 285 tablet 0    folic acid 1 MG Oral Tab Take 1 tablet (1 mg total) by mouth daily.  90 tablet 1    CELECOXIB 200 MG Oral Cap TAKE 1 CAPSULE(200 MG) BY MOUTH DAILY 30 capsule 0    amitriptyline 10 MG Oral Tab Take 1 tablet (10 mg total) by mouth nightly. aspirin 325 MG Oral Tab Take 1 tablet (325 mg total) by mouth 2 (two) times daily. 28 tablet 0    Adalimumab (HUMIRA PEN) 40 MG/0.4ML Subcutaneous Pen-injector Kit Inject 40 mg into the skin every 14 (fourteen) days. 12 each 3    losartan Potassium 50 MG Oral Tab Take by mouth daily. FEROSUL 325 (65 Fe) MG Oral Tab       metFORMIN HCl 500 MG Oral Tab Take 1 tablet (500 mg total) by mouth daily. 1    oxyCODONE-acetaminophen (PERCOCET) 5-325 MG Oral Tab Take 1 tablet by mouth every 6 (six) hours as needed for Pain. (Patient not taking: Reported on 5/31/2023) 20 tablet 0    traMADol 50 MG Oral Tab Take 1 tablet (50 mg total) by mouth every 6 (six) hours as needed for Pain. No alcohol or driving on this med. Stop if lethargic or hallucinating. (Patient not taking: Reported on 7/31/2023) 20 tablet 0    gabapentin 300 MG Oral Cap Take 1 capsule (300 mg total) by mouth 3 (three) times daily. (Patient not taking: Reported on 7/31/2023)      polyethylene glycol, PEG 3350, 17 g Oral Powd Pack Take 17 g by mouth daily as needed. (Patient not taking: Reported on 7/31/2023) 24 each 0     .cmed  Allergies:    Enalapril               Coughing    Comment:cough      ROS:   All other ROS are negative. PHYSICAL EXAM:   GEN: AAOx3, NAD  HEENT: EOMI, PERRLA, no injection or icterus, oral mucosa moist, no oral lesions. No lymphadenopathy. No facial rash  CVS: RRR, no murmurs rubs or gallops. Equal 2+ distal pulses. LUNGS: CTAB, no increased work of breathing  ABDOMEN:  soft NT/ND, +BS, no HSM  SKIN: No rashes or skin lesions. No nail findings  MSK:  Cervical spine: FROM  Hands: no synovitis in DIP, PIP and MCP, strong full fists  Wrist: FROM, no pain or swelling or warmth on palpation  Elbow: L elbow mild flexion contracture.  R elbow FROM  Shoulders: b/l shoulders FROM   Hip: normal log roll, no lateral hip pain, MAI test negative b/l  Knees: R knee stable  Ankles: no swelling, TTP in b/l ankles   Feet: no pain with MTP squeeze, no toe swelling or pain or warmth on palpation with FROM  Spine: no lumbar or sacral pain on palpation. NEURO: Cranial nerves II-XII intact grossly. 5/5 strength throughout in both upper and lower extremities, sensation intact. PSYCH: normal mood       LABS:     May 2019:  Vitamin D 48, Hgb 9.2  RF 17.9, ESR 26, CRP 6.9 mg/L    Previous rheumatologist  Synovial fluid removed showing cell count of 38,405, nucleated cells 83%. Negative for crystals     Imaging:     None    ASSESSMENT/PLAN:     Seropositive RA (+RF and CCP)- slightly worsened   - had b/l TKR but now having pain and stiffness in her hands, fingers and even her ankles.   She was given a Medrol Dosepak during her last visit which helped  - she had a +quant TB in 2017 but treated with rifampin for 4 mos in Jan 2018, will need annual CXR to monitor  - Continue methotrexate 8 pills weekly and folic acid daily and  mg daily  - on Humira every 2 weeks but wants to switch to another biologic  - will switch to Enbrel weekly  - reviewed blood work with patient, blood work every 3 mos     B/L TKR  - still having some knee pain, advised to see orthopedic     +Quant TB  - treated in Jan 2018     Pt will f/u in 3-4 mos     Alvina Nye MD  7/31/2023  10:00 AM

## 2023-08-01 NOTE — TELEPHONE ENCOUNTER
PA start    Prior authorization for: Enbrel    Medication form: 50 mg pen    Submission method: fax    Spoke with (if by phone):     Date submitted: 8/1    Tracking #:    QF-TB result:

## 2023-08-01 NOTE — TELEPHONE ENCOUNTER
Script pended. QFT order? Does pt need teaching?     PA Approved    Prior authorization for: Enbrel    Medication form: 50 mg pen    Date received: 8/1/23    Approval #: [id number]    Approved dates: 8/1/23 to 8/1/2024    Pharmacy for medication:    QF-TB results:

## 2023-08-02 RX ORDER — MEDROXYPROGESTERONE ACETATE 150 MG/ML
50 INJECTION, SUSPENSION INTRAMUSCULAR
Qty: 4 EACH | Refills: 5 | Status: SHIPPED | OUTPATIENT
Start: 2023-08-02 | End: 2023-09-01

## 2023-08-02 NOTE — TELEPHONE ENCOUNTER
She has a hx of +quant TB and treated in 2018 no blood work needed  Also should not need teaching, she was on [employer]

## 2023-08-02 NOTE — TELEPHONE ENCOUNTER
[address] [facility] #[id number] , bulmaro Kelly approved and sent to pharmacy.

## 2023-10-21 ENCOUNTER — LAB ENCOUNTER (OUTPATIENT)
Dept: LAB | Age: 68
End: 2023-10-21
Attending: INTERNAL MEDICINE

## 2023-10-21 DIAGNOSIS — Z51.81 THERAPEUTIC DRUG MONITORING: ICD-10-CM

## 2023-10-21 DIAGNOSIS — M05.9 SEROPOSITIVE RHEUMATOID ARTHRITIS (HCC): ICD-10-CM

## 2023-10-21 LAB
ALBUMIN SERPL-MCNC: 3.7 G/DL (ref 3.4–5)
ALT SERPL-CCNC: 16 U/L
AST SERPL-CCNC: 12 U/L (ref 15–37)
BASOPHILS # BLD AUTO: 0.02 X10(3) UL (ref 0–0.2)
BASOPHILS NFR BLD AUTO: 0.5 %
CREAT BLD-MCNC: 1.13 MG/DL
CRP SERPL-MCNC: <0.29 MG/DL (ref ?–0.3)
DEPRECATED RDW RBC AUTO: 49.2 FL (ref 35.1–46.3)
EGFRCR SERPLBLD CKD-EPI 2021: 54 ML/MIN/1.73M2 (ref 60–?)
EOSINOPHIL # BLD AUTO: 0.17 X10(3) UL (ref 0–0.7)
EOSINOPHIL NFR BLD AUTO: 4 %
ERYTHROCYTE [DISTWIDTH] IN BLOOD BY AUTOMATED COUNT: 13.2 % (ref 11–15)
ERYTHROCYTE [SEDIMENTATION RATE] IN BLOOD: 4 MM/HR
HCT VFR BLD AUTO: 35.7 %
HGB BLD-MCNC: 11.5 G/DL
IMM GRANULOCYTES # BLD AUTO: 0.01 X10(3) UL (ref 0–1)
IMM GRANULOCYTES NFR BLD: 0.2 %
LYMPHOCYTES # BLD AUTO: 1.95 X10(3) UL (ref 1–4)
LYMPHOCYTES NFR BLD AUTO: 45.6 %
MCH RBC QN AUTO: 32.8 PG (ref 26–34)
MCHC RBC AUTO-ENTMCNC: 32.2 G/DL (ref 31–37)
MCV RBC AUTO: 101.7 FL
MONOCYTES # BLD AUTO: 0.49 X10(3) UL (ref 0.1–1)
MONOCYTES NFR BLD AUTO: 11.4 %
NEUTROPHILS # BLD AUTO: 1.64 X10 (3) UL (ref 1.5–7.7)
NEUTROPHILS # BLD AUTO: 1.64 X10(3) UL (ref 1.5–7.7)
NEUTROPHILS NFR BLD AUTO: 38.3 %
PLATELET # BLD AUTO: 183 10(3)UL (ref 150–450)
RBC # BLD AUTO: 3.51 X10(6)UL
WBC # BLD AUTO: 4.3 X10(3) UL (ref 4–11)

## 2023-10-21 PROCEDURE — 84460 ALANINE AMINO (ALT) (SGPT): CPT

## 2023-10-21 PROCEDURE — 85652 RBC SED RATE AUTOMATED: CPT

## 2023-10-21 PROCEDURE — 86140 C-REACTIVE PROTEIN: CPT

## 2023-10-21 PROCEDURE — 82565 ASSAY OF CREATININE: CPT

## 2023-10-21 PROCEDURE — 82040 ASSAY OF SERUM ALBUMIN: CPT

## 2023-10-21 PROCEDURE — 85025 COMPLETE CBC W/AUTO DIFF WBC: CPT

## 2023-10-21 PROCEDURE — 36415 COLL VENOUS BLD VENIPUNCTURE: CPT

## 2023-10-21 PROCEDURE — 84450 TRANSFERASE (AST) (SGOT): CPT

## 2023-10-23 ENCOUNTER — OFFICE VISIT (OUTPATIENT)
Dept: RHEUMATOLOGY | Facility: CLINIC | Age: 68
End: 2023-10-23

## 2023-10-23 VITALS
HEIGHT: 63 IN | DIASTOLIC BLOOD PRESSURE: 77 MMHG | SYSTOLIC BLOOD PRESSURE: 117 MMHG | WEIGHT: 142 LBS | BODY MASS INDEX: 25.16 KG/M2 | HEART RATE: 66 BPM

## 2023-10-23 DIAGNOSIS — Z51.81 THERAPEUTIC DRUG MONITORING: ICD-10-CM

## 2023-10-23 DIAGNOSIS — M79.18 MYOFASCIAL PAIN SYNDROME: ICD-10-CM

## 2023-10-23 DIAGNOSIS — G89.29 CHRONIC PAIN OF BOTH KNEES: ICD-10-CM

## 2023-10-23 DIAGNOSIS — M05.9 SEROPOSITIVE RHEUMATOID ARTHRITIS (HCC): Primary | ICD-10-CM

## 2023-10-23 DIAGNOSIS — R76.12 POSITIVE QUANTIFERON-TB GOLD TEST: ICD-10-CM

## 2023-10-23 DIAGNOSIS — M25.561 CHRONIC PAIN OF BOTH KNEES: ICD-10-CM

## 2023-10-23 DIAGNOSIS — M25.562 CHRONIC PAIN OF BOTH KNEES: ICD-10-CM

## 2023-10-23 PROCEDURE — 3078F DIAST BP <80 MM HG: CPT | Performed by: INTERNAL MEDICINE

## 2023-10-23 PROCEDURE — 3074F SYST BP LT 130 MM HG: CPT | Performed by: INTERNAL MEDICINE

## 2023-10-23 PROCEDURE — 99214 OFFICE O/P EST MOD 30 MIN: CPT | Performed by: INTERNAL MEDICINE

## 2023-10-23 PROCEDURE — 3008F BODY MASS INDEX DOCD: CPT | Performed by: INTERNAL MEDICINE

## 2023-10-23 RX ORDER — FOLIC ACID 1 MG/1
1 TABLET ORAL DAILY
Qty: 90 TABLET | Refills: 1 | Status: SHIPPED | OUTPATIENT
Start: 2023-10-23

## 2023-10-23 RX ORDER — HYDROXYCHLOROQUINE SULFATE 200 MG/1
400 TABLET, FILM COATED ORAL DAILY
Qty: 180 TABLET | Refills: 1 | Status: SHIPPED | OUTPATIENT
Start: 2023-10-23

## 2023-10-23 RX ORDER — METHOTREXATE 2.5 MG/1
20 TABLET ORAL WEEKLY
Qty: 103 TABLET | Refills: 1 | Status: SHIPPED | OUTPATIENT
Start: 2023-10-23

## 2023-10-23 RX ORDER — GABAPENTIN 600 MG/1
600 TABLET ORAL 3 TIMES DAILY
Qty: 90 TABLET | Refills: 2 | Status: SHIPPED | OUTPATIENT
Start: 2023-10-23

## 2023-10-23 NOTE — PROGRESS NOTES
Rob Moran is a 78year old female. HPI:   Patient presents with:  Rheumatoid Arthritis  Back Pain  Ankle Pain  Knee Pain: Bilateral    I had the pleasure of seeing Rob Moran on 10/23/2023 for follow up Seropositve RA (+RF and CCP). 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 She was then seen by rheumatologist Dr. Malgorzata Botello and diagnosed with RA. She is found to have elevated RF and inflammatory markers. Started on methotrexate 6 pills weekly and meloxicam 15 mg daily in the summer 2018  She states that the medications are not working. She also had her right shoulder and knee injected with steroids. She continues to complain of right shoulder, knee and ankle pain. She has to use a walker right now due to the pain. She also has intermittent swelling in her knee and ankle. 8/13/2021:  Presents for follow-up of seropositive RA  Currently on Humira every 2 weeks and methotrexate  Joints are doing very well. Reports some pain in her left elbow but no swelling  Continues to have bilateral knee pain. She has had cortisone injections and gel injections with minimal improvement. During her last visit she was given referral to orthopedic but has not seen them  Denies any other joint pain or swelling.   Recent blood work reviewed, inflammatory markers are normal    11/19/2021:  Presents for follow-up of seropositive RA  Currently on methotrexate 6 pills weekly  Has not been taking Humira for the past month  Since stopped taking Humira knee pain is better  XR L knee shows progressive severe OA of the medial compartment  XR R knee shows moderate narrowing of medial and lateral cmpts  Still has not seen orthopedic, have given her 2 referrals already  Has some pain and swelling in ankles otherwise no pain in hands, elbows or shoulders  Reports some lower back pain mostly on the R side no radiculopathy symptoms     2/18/2022:  Presents for follow-up of seropositive RA  Currently on methotrexate 6 pills weekly  She was seen by orthopedic recently and considering surgery for her knee. They ordered an MRI of her right knee. Now back on Humira but taking it weekly. Suppose to be on it every 2 weeks  Also on methotrexate 6 pills weekly  Continue to have a lot of pain in R knee  Also having some R sided back pain, no radiculopathy symptoms     6/4/2022:  Presents for follow-up of seropositive RA  Currently on Humira every 2 weeks and methotrexate 6 pills weekly  She had right knee replacement on 5/23  Has R knee pain but otherwise no other joint pain or swelling  Plans on having L knee replaced in the next 2-3 mos   Doing at home physical therapy    10/4/2022:  Presents for follow-up of seropositive RA  Currently on Humira every 2 weeks and methotrexate 6 pills weekly  R knee replaced in May 2022 and doing well  Going to have the Left replaced in November   Has been able to walk better after the knee replacement  No other joint pain but hands and fingers will get stuck at times, feels like they are cramping   Shoulders are good     1/9/2023  Presents for follow-up of seropositive RA  Currently on Humira every 2 weeks and methotrexate 6 pills weekly  Had L knee replacement 11/28 and doing well  Having more joint pain in hands, elbows. Some swelling in the ankles. Whole body aches.    Has been feeling really cold these days, no fevers or recent infection    2/27/2023:  Presents for follow-up of seropositive RA  Currently on Humira every 2 weeks and methotrexate 6 pills weekly  During last visit was having some pain and swelling in left ankle, medrol dose pack was given and helped but now pain and swelling in left ankle has returned. Has mild pain in wrists and elbows, no swelling. Some stiffness in hands, can make fist but not tight. Has some pain in right ankle too  Had b/l knee replacements and has some pain     5/31/2023:  Presents for follow-up of seropositive RA  Currently on Humira every 2 weeks and methotrexate 8 pills weekly  B/L knee's now replaced   Having some knee pain  Also hands feel inflamed and at times fingers get stuck. Able to close hand but not tight  Some pain in left elbow  Some pain in ankles    7/31/2023:  Presents for follow-up of seropositive RA  Currently on Humira every 2 weeks, methotrexate 8 pills weekly and  mg daily  During her last visit she was having some more pain in her hands, left elbow and ankles. Hydroxychloroquine was added at that time  Continues to have pain in all the joints  Has swelling in the left knee, left knee was replaced   Also muscles hurt too  Pain is no significant, rates it a 6/10  When she goes on prednisone does not help the joints     10/23/2023:  Presents for follow-up of seropositive RA  Currently on methotrexate 8 pills weekly and  mg daily  Now on Enbrel weekly for past 2 mos, was initially on Humira but continued to have joint pain   Having pain everywhere, in the shoulders, elbows, hands and mid back. Also having neck pain.  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 120 tablet 0    methotrexate 2.5 MG Oral Tab Take 8 tablets (20 mg total) by mouth once a week. 423 tablet 0    folic acid 1 MG Oral Tab Take 1 tablet (1 mg total) by mouth daily. 90 tablet 1    losartan Potassium 50 MG Oral Tab Take by mouth daily. FEROSUL 325 (65 Fe) MG Oral Tab       metFORMIN HCl 500 MG Oral Tab Take 1 tablet (500 mg total) by mouth daily. 1    CELECOXIB 200 MG Oral Cap TAKE 1 CAPSULE(200 MG) BY MOUTH DAILY (Patient not taking: Reported on 10/23/2023) 30 capsule 0    oxyCODONE-acetaminophen (PERCOCET) 5-325 MG Oral Tab Take 1 tablet by mouth every 6 (six) hours as needed for Pain. (Patient not taking: Reported on 5/31/2023) 20 tablet 0    traMADol 50 MG Oral Tab Take 1 tablet (50 mg total) by mouth every 6 (six) hours as needed for Pain. No alcohol or driving on this med. Stop if lethargic or hallucinating. (Patient not taking: Reported on 7/31/2023) 20 tablet 0    gabapentin 300 MG Oral Cap Take 1 capsule (300 mg total) by mouth 3 (three) times daily. (Patient not taking: Reported on 7/31/2023)      amitriptyline 10 MG Oral Tab Take 1 tablet (10 mg total) by mouth nightly. (Patient not taking: Reported on 10/23/2023)      aspirin 325 MG Oral Tab Take 1 tablet (325 mg total) by mouth 2 (two) times daily. (Patient not taking: Reported on 10/23/2023) 28 tablet 0    polyethylene glycol, PEG 3350, 17 g Oral Powd Pack Take 17 g by mouth daily as needed. (Patient not taking: Reported on 7/31/2023) 24 each 0     .cmed  Allergies:    Enalapril               Coughing    Comment:cough      ROS:   All other ROS are negative. PHYSICAL EXAM:   GEN: AAOx3, NAD  HEENT: EOMI, PERRLA, no injection or icterus, oral mucosa moist, no oral lesions. No lymphadenopathy.  No facial rash  CVS: RRR, no murmurs rubs or gallops. Equal 2+ distal pulses. LUNGS: CTAB, no increased work of breathing  ABDOMEN:  soft NT/ND, +BS, no HSM  SKIN: No rashes or skin lesions. No nail findings  MSK:  TTP in paraspinal region, trapezius, shoulders, thigh and calves   Cervical spine: FROM  Hands: no synovitis in DIP, PIP and MCP, strong full fists  Wrist: FROM, no pain or swelling or warmth on palpation  Elbow: L elbow mild flexion contracture. R elbow FROM  Shoulders: b/l shoulders FROM   Hip: normal log roll, no lateral hip pain, MAI test negative b/l  Knees: R knee stable  Ankles: no swelling, TTP in b/l ankles   Feet: no pain with MTP squeeze, no toe swelling or pain or warmth on palpation with FROM  Spine: no lumbar or sacral pain on palpation. NEURO: Cranial nerves II-XII intact grossly. 5/5 strength throughout in both upper and lower extremities, sensation intact. PSYCH: normal mood       LABS:     May 2019:  Vitamin D 48, Hgb 9.2  RF 17.9, ESR 26, CRP 6.9 mg/L    Previous rheumatologist  Synovial fluid removed showing cell count of 38,405, nucleated cells 83%. Negative for crystals     Imaging:     None    ASSESSMENT/PLAN:     Seropositive RA (+RF and CCP)  - had b/l TKR but now having pain and stiffness in her hands, fingers and even her ankles.   She was given a Medrol Dosepak during her last visit which helped  - she had a +quant TB in 2017 but treated with rifampin for 4 mos in Jan 2018, will need annual CXR to monitor  - Continue methotrexate 8 pills weekly and folic acid daily and  mg daily  - off Humira and now Enbrel weekly for past 2 mos  - continues to have a lot of pain but mostly in the muscles, no swelling or synovitis noted on exam   - Patient given referral for ophthalmology to evaluate for Plaquenil toxicity  - reviewed blood work with patient, blood work every 3 mos     Myofascial pain syndrome  - having worsening muscle pain  - plan to increase gabapentin 600 mg TID    B/L TKR  - still having some knee pain, advised to see orthopedic     +Quant TB  - treated in Jan 2018     Pt will f/u in 3-4 mos     Talita Pillai MD  10/23/2023  10:20 AM

## 2023-11-16 ENCOUNTER — HOSPITAL ENCOUNTER (OUTPATIENT)
Dept: GENERAL RADIOLOGY | Facility: HOSPITAL | Age: 68
Discharge: HOME | End: 2023-11-16
Attending: ORTHOPAEDIC SURGERY
Payer: MEDICAID

## 2023-11-16 ENCOUNTER — OFFICE VISIT (OUTPATIENT)
Dept: ORTHOPEDICS CLINIC | Facility: CLINIC | Age: 68
End: 2023-11-16

## 2023-11-16 DIAGNOSIS — Z47.89 ORTHOPEDIC AFTERCARE: ICD-10-CM

## 2023-11-16 DIAGNOSIS — M06.9 RHEUMATOID ARTHRITIS INVOLVING BOTH KNEES, UNSPECIFIED WHETHER RHEUMATOID FACTOR PRESENT (HCC): Primary | ICD-10-CM

## 2023-11-16 PROCEDURE — 73564 X-RAY EXAM KNEE 4 OR MORE: CPT | Performed by: ORTHOPAEDIC SURGERY

## 2023-11-16 PROCEDURE — 99213 OFFICE O/P EST LOW 20 MIN: CPT | Performed by: ORTHOPAEDIC SURGERY

## 2023-11-16 NOTE — PROGRESS NOTES
NURSING INTAKE COMMENTS:   Chief Complaint   Patient presents with    Follow - Up     Bilateral knee - Left  TKA 11/28/22 - Pt states her knees feel weak. Rates pain 2/10. States knee is swollen . R TKA - 5/23/22 - Language line on the phone- YE#[id number]       HPI: This 80year old female presents today with complaints of bilateral knee pain follow-up. She is now 1 year postoperative from her most recent total knee arthroplasty surgery. She has a history of rheumatoid arthritis. She continues to have some swelling in the left knee. Her symptoms are better than preoperative. No recent injury to the knees. No fevers or chills. No calf pain. 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    Diabetes ([address]     Esophageal reflux     Essential hypertension     High blood pressure     OA (osteoarthritis)     RA (rheumatoid arthritis) (HCC)     Visual impairment     glasses     Past Surgical History:   Procedure Laterality Date    CHOLECYSTECTOMY  03/25/2021    COLONOSCOPY N/A 10/7/2020    Procedure: COLONOSCOPY;  Surgeon: Petra Solorio MD;  [facility] ENDOSCOPY    HYSTERECTOMY      KNEE REPLACEMENT SURGERY Right 05/23/2022    TOTAL KNEE REPLACEMENT Right 05/23/2022    TUBAL LIGATION       Current Outpatient Medications   Medication Sig Dispense Refill    gabapentin 600 MG Oral Tab Take 1 tablet (600 mg total) by mouth 3 (three) times daily. 90 tablet 2    folic acid 1 MG Oral Tab Take 1 tablet (1 mg total) by mouth daily. 90 tablet 1    hydroxychloroquine 200 MG Oral Tab Take 2 tablets (400 mg total) by mouth daily. 180 tablet 1    methotrexate 2.5 MG Oral Tab Take 8 tablets (20 mg total) by mouth once a week. 103 tablet 1    CELECOXIB 200 MG Oral Cap TAKE 1 CAPSULE(200 MG) BY MOUTH DAILY (Patient not taking: Reported on 10/23/2023) 30 capsule 0    oxyCODONE-acetaminophen (PERCOCET) 5-325 MG Oral Tab Take 1 tablet by mouth every 6 (six) hours as needed for Pain.  (Patient not taking: Reported on 5/31/2023) 20 tablet 0    traMADol 50 MG Oral Tab Take 1 tablet (50 mg total) by mouth every 6 (six) hours as needed for Pain. No alcohol or driving on this med. Stop if lethargic or hallucinating. (Patient not taking: Reported on 7/31/2023) 20 tablet 0    amitriptyline 10 MG Oral Tab Take 1 tablet (10 mg total) by mouth nightly. (Patient not taking: Reported on 10/23/2023)      aspirin 325 MG Oral Tab Take 1 tablet (325 mg total) by mouth 2 (two) times daily. (Patient not taking: Reported on 10/23/2023) 28 tablet 0    polyethylene glycol, PEG 3350, 17 g Oral Powd Pack Take 17 g by mouth daily as needed. (Patient not taking: Reported on 7/31/2023) 24 each 0    losartan Potassium 50 MG Oral Tab Take by mouth daily. FEROSUL 325 (65 Fe) MG Oral Tab       metFORMIN HCl 500 MG Oral Tab Take 1 tablet (500 mg total) by mouth daily.   1     Allergies   Allergen Reactions    Enalapril Coughing     cough     Family History   Problem Relation Age of Onset    Diabetes Mother     Hypertension Mother     Diabetes Sister     Diabetes Brother     No Known Problems Father        Social History     Occupational History    Not on file   Tobacco Use    Smoking status: Never    Smokeless tobacco: Never   Vaping Use    Vaping Use: Never used   Substance and Sexual Activity    Alcohol use: Not Currently    Drug use: Never    Sexual activity: Not on file        Review of Systems:  GENERAL: denies fevers, chills, night sweats, fatigue, unintentional weight loss/gain  SKIN: denies skin lesions, open sores, rash  HEENT:denies recent vision change, new nasal congestion,hearing loss, tinnitus, sore throat, headaches  RESPIRATORY: denies new shortness of breath, cough, asthma, wheezing  CARDIOVASCULAR: denies chest pain, leg cramps with exertion, palpitations, leg swelling  GI: denies abdominal pain, nausea, vomiting, diarrhea, constipation, hematochezia, worsening heartburn or stomach ulcers  : denies dysuria, hematuria, incontinence, increased frequency, urgency, difficulty urinating  MUSCULOSKELETAL: denies musculoskeletal complaints other than in HPI  NEURO: denies numbness, tingling, weakness, balance issues, dizziness, memory loss  PSYCHIATRIC: denies Hx of depression, anxiety, other psychiatric disorders  HEMATOLOGIC: denies blood clots, anemia, blood clotting disorders, blood transfusion  ENDOCRINE: denies autoimmune disease, thyroid issues, or diabetes  ALLERGY: denies asthma, seasonal allergies    Physical Examination:    There were no vitals taken for this visit. Constitutional: appears well hydrated, alert and responsive, no acute distress noted  Extremities: She walks without a limp. Further exam left knee reveals a trace effusion. Range of motion 0 to 120 degrees bilaterally. Good varus valgus stability in mid flexion bilaterally. No calf tenderness or swelling. Incisions are well-healed. Minimal induration of the fat pad bilaterally. No pain with passive range of motion of the hip bilaterally. Neurological: Light touch and pinprick sensation intact throughout the lower extremities. Ankle dorsiflexion plantarflexion EHL knee extension and hip flexion strength are 5 out of 5 bilaterally. No clonus. Imaging:   XR KNEE COMPLETE BILAT EM(CPT=73564-50)    Result Date: 11/16/2023  PROCEDURE: XR KNEE COMPLETE BILAT EM  COMPARISON: [facility]. for [address], XR KNEE (3 VIEWS), LEFT (CPT=73562), 2/23/2023, 8:56 AM.  [facility]. for [address], XR KNEE (3 VIEWS), LEFT (BNU=[id number]), 1/12/2023, 3:36 PM.  [facility]. for [address], XR KNEE (3 VIEWS), LEFT (CPT=73562), 12/14/2022, 1:08 PM.  INDICATIONS: Follow up bilateral knee surgery. TECHNIQUE: Four views were obtained. FINDINGS:  BONES: Intact total bilateral knee arthroplasties. No acute fracture or malalignment. SOFT TISSUES: Negative. No visible soft tissue swelling.  EFFUSION: Small bilateral suprapatellar effusions. OTHER: Negative. CONCLUSION:   Intact total bilateral knee arthroplasties. Small bilateral suprapatellar effusions. Dictated by (CST): Rey Garvey MD on 11/16/2023 at 10:46 AM     Finalized by (CST): Rey Garvey MD on 11/16/2023 at 10:48 AM             Labs:  Lab Results   Component Value Date    WBC 4.3 10/21/2023    HGB 11.5 (L) 10/21/2023    .0 10/21/2023      Lab Results   Component Value Date    GLU 96 07/05/2023    BUN 20 (H) 07/05/2023    CREATSERUM 1.13 (H) 10/21/2023    GFRNAA 60 05/24/2022    GFRAA 69 05/24/2022        Assessment and Plan:  Diagnoses and all orders for this visit:    Rheumatoid arthritis involving both knees, unspecified whether rheumatoid factor present ([facility] [address]    Orthopedic aftercare  -     XR KNEE COMPLETE BILAT EM(CPT=73564-50); Future        Assessment: Well-functioning bilateral total knee arthroplasties, mild left knee swelling    Plan: I advised continued outpatient follow-up with rheumatology for evaluation and treatment of her knee swelling and rheumatoid arthritis. Advised icing and oral anti-inflammatory use as medically tolerated. Continue strengthening exercises for the knee. Discussed dental prophylaxis. Follow-up again in 2 years with x-rays of both knees. Follow Up: Return in about 2 years (around 11/16/2025).     Gerald Piper MD

## 2023-12-06 ENCOUNTER — TELEPHONE (OUTPATIENT)
Dept: RHEUMATOLOGY | Facility: CLINIC | Age: 68
End: 2023-12-06

## 2023-12-06 NOTE — TELEPHONE ENCOUNTER
Patient stopped by  to address Enbrel medication is causing her to have a rash, itch and warm to touch on her left knee where she administered the injection. She states she gave herself the injection on 12/3. Please advise.

## 2023-12-06 NOTE — TELEPHONE ENCOUNTER
Name and  verified via . Pt stated the reaction in at injection site; it is \"thick like cake\", itchy, and warm to touch. She stated it happened on right leg and then happened on left leg. Pt stated she was advised by her primary care provider to contact office; she is having an allergic reaction to the medication. She has not taking anything OTC for the reaction. Please advise.

## 2024-01-07 NOTE — PATIENT INSTRUCTIONS
You were seen for rheumatoid arthritis  You are having a lot of muscle pain likely from fibromyalgia  Continue Enbrel weekly, methotrexate and hydroxychloroquine  You do have to get your eyes checked on the hydroxychloroquine, I gave you a referral  For your muscle pain increase gabapentin, take 600 mg 3 times a day  Blood work in 3 months  See me in 3 to 4 months
no

## 2024-02-23 ENCOUNTER — TELEPHONE (OUTPATIENT)
Dept: RHEUMATOLOGY | Facility: CLINIC | Age: 69
End: 2024-02-23

## 2024-02-23 ENCOUNTER — OFFICE VISIT (OUTPATIENT)
Dept: RHEUMATOLOGY | Facility: CLINIC | Age: 69
End: 2024-02-23

## 2024-02-23 VITALS
DIASTOLIC BLOOD PRESSURE: 85 MMHG | HEART RATE: 62 BPM | SYSTOLIC BLOOD PRESSURE: 147 MMHG | HEIGHT: 63 IN | BODY MASS INDEX: 26.75 KG/M2 | WEIGHT: 151 LBS

## 2024-02-23 DIAGNOSIS — Z51.81 THERAPEUTIC DRUG MONITORING: ICD-10-CM

## 2024-02-23 DIAGNOSIS — Z13.820 OSTEOPOROSIS SCREENING: Primary | ICD-10-CM

## 2024-02-23 DIAGNOSIS — M05.9 SEROPOSITIVE RHEUMATOID ARTHRITIS (HCC): ICD-10-CM

## 2024-02-23 NOTE — TELEPHONE ENCOUNTER
Attempted PA in [employer]; unable to complete.    Phoned Pharmacy # [phone] on [employer]; took me to [facility]. Spoke to Aria. Then transferred to Pharmacy Services Department at [phone]; spoke to Saba. She will fax PA forms now.

## 2024-02-23 NOTE — TELEPHONE ENCOUNTER
If we can get patient approved for Cimzia loading and maintenance dose for rheumatoid arthritis.  She had injection site reactions with Enbrel.  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

## 2024-02-23 NOTE — PROGRESS NOTES
Kat Lara is a 68 year old female.    HPI:     Chief Complaint   Patient presents with    Follow - Up     Seropositive rheumatoid arthritis    Discuss alternative for Enbrel since she had a reaction to it.     I had the pleasure of seeing Kat Lara on 2/23/2024 for follow up Seropositve RA (+RF and CCP).    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   She was then seen by rheumatologist Dr. Falguni Owusu and diagnosed with RA.  She is found to have elevated RF and inflammatory markers.  Started on methotrexate 6 pills weekly and meloxicam 15 mg daily in the summer 2018  She states that the medications are not working.  She also had her right shoulder and knee injected with steroids.  She continues to complain of right shoulder, knee and ankle pain.  She has to use a walker right now due to the pain.  She also has intermittent swelling in her knee and ankle.    8/13/2021:  Presents for follow-up of seropositive RA  Currently on Humira every 2 weeks and methotrexate  Joints are doing very well.  Reports some pain in her left elbow but no swelling  Continues to have bilateral knee pain.  She has had cortisone injections and gel injections with minimal improvement.  During her last visit she was given referral to orthopedic but has not seen them  Denies any other joint pain or swelling.  Recent blood work  reviewed, inflammatory markers are normal    11/19/2021:  Presents for follow-up of seropositive RA  Currently on methotrexate 6 pills weekly  Has not been taking Humira for the past month  Since stopped taking Humira knee pain is better  XR L knee shows progressive severe OA of the medial compartment  XR R knee shows moderate narrowing of medial and lateral cmpts  Still has not seen orthopedic, have given her 2 referrals already  Has some pain and swelling in ankles otherwise no pain in hands, elbows or shoulders  Reports some lower back pain mostly on the R side no radiculopathy symptoms     2/18/2022:  Presents for follow-up of seropositive RA  Currently on methotrexate 6 pills weekly  She was seen by orthopedic recently and considering surgery for her knee.  They ordered an MRI of her right knee.  Now back on Humira but taking it weekly. Suppose to be on it every 2 weeks  Also on methotrexate 6 pills weekly  Continue to have a lot of pain in R knee  Also having some R sided back pain, no radiculopathy symptoms     6/4/2022:  Presents for follow-up of seropositive RA  Currently on Humira every 2 weeks and methotrexate 6 pills weekly  She had right knee replacement on 5/23  Has R knee pain but otherwise no other joint pain or swelling  Plans on having L knee replaced in the next 2-3 mos   Doing at home physical therapy    10/4/2022:  Presents for follow-up of seropositive RA  Currently on Humira every 2 weeks and methotrexate 6 pills weekly  R knee replaced in May 2022 and doing well  Going to have the Left replaced in November   Has been able to walk better after the knee replacement  No other joint pain but hands and fingers will get stuck at times, feels like they are cramping   Shoulders are good     1/9/2023  Presents for follow-up of seropositive RA  Currently on Humira every 2 weeks and methotrexate 6 pills weekly  Had L knee replacement 11/28 and doing well  Having more joint pain in hands, elbows. Some  swelling in the ankles. Whole body aches.   Has been feeling really cold these days, no fevers or recent infection    2/27/2023:  Presents for follow-up of seropositive RA  Currently on Humira every 2 weeks and methotrexate 6 pills weekly  During last visit was having some pain and swelling in left ankle, medrol dose pack was given and helped but now pain and swelling in left ankle has returned.  Has mild pain in wrists and elbows, no swelling. Some stiffness in hands, can make fist but not tight.  Has some pain in right ankle too  Had b/l knee replacements and has some pain     5/31/2023:  Presents for follow-up of seropositive RA  Currently on Humira every 2 weeks and methotrexate 8 pills weekly  B/L knee's now replaced   Having some knee pain  Also hands feel inflamed and at times fingers get stuck. Able to close hand but not tight  Some pain in left elbow  Some pain in ankles    7/31/2023:  Presents for follow-up of seropositive RA  Currently on Humira every 2 weeks, methotrexate 8 pills weekly and  mg daily  During her last visit she was having some more pain in her hands, left elbow and ankles.  Hydroxychloroquine was added at that time  Continues to have pain in all the joints  Has swelling in the left knee, left knee was replaced   Also muscles hurt too  Pain is no significant, rates it a 6/10  When she goes on prednisone does not help the joints     10/23/2023:  Presents for follow-up of seropositive RA  Currently on methotrexate 8 pills weekly and  mg daily  Now on Enbrel weekly for past 2 mos, was initially on Humira but continued to have joint pain   Having pain everywhere, in the shoulders, elbows, hands and mid back. Also having neck pain. Muscles also hurt  Recent blood work shows normal ESR and CRP    2/23/2024:  Presents for follow-up of seropositive RA  Currently on methotrexate 8 pills weekly and  mg daily  Stopped Enbrel as she had a injection site reaction about 2 mos  ago  Has joint pain in the shoulders, elbows and hands. Hard to make a fist with left hand.  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 90 tablet 2    folic acid 1 MG Oral Tab Take 1 tablet (1 mg total) by mouth daily. 90 tablet 1    hydroxychloroquine 200 MG Oral Tab Take 2 tablets (400 mg total) by mouth daily. 180 tablet 1    methotrexate 2.5 MG Oral Tab Take 8 tablets (20 mg total) by mouth once a week. 103 tablet 1    losartan Potassium 50 MG Oral Tab Take by mouth daily.      FEROSUL 325 (65 Fe) MG Oral Tab       metFORMIN HCl 500 MG Oral Tab Take 1 tablet (500 mg total) by mouth daily.  1    CELECOXIB 200 MG Oral Cap TAKE 1 CAPSULE(200 MG) BY MOUTH DAILY (Patient not taking: Reported on 10/23/2023) 30 capsule 0    oxyCODONE-acetaminophen (PERCOCET) 5-325 MG Oral Tab Take 1 tablet by mouth every 6 (six) hours as needed for Pain. (Patient not taking: Reported on 2/23/2024) 20 tablet 0    traMADol 50 MG Oral Tab Take 1 tablet (50 mg total) by mouth every 6 (six) hours as needed for Pain. No alcohol or driving on this med. Stop if lethargic or hallucinating. (Patient not taking: Reported on 7/31/2023) 20 tablet 0    amitriptyline 10 MG Oral Tab Take 1 tablet (10 mg total) by mouth nightly. (Patient not taking: Reported on 10/23/2023)      aspirin 325 MG Oral Tab Take 1 tablet (325 mg total) by mouth 2 (two) times daily. (Patient not taking: Reported on 10/23/2023) 28  tablet 0    polyethylene glycol, PEG 3350, 17 g Oral Powd Pack Take 17 g by mouth daily as needed. (Patient not taking: Reported on 7/31/2023) 24 each 0     .cmed  Allergies:  Allergies   Allergen Reactions    Enalapril Coughing     cough         ROS:   All other ROS are negative.     PHYSICAL EXAM:   GEN: AAOx3, NAD  HEENT: EOMI, PERRLA, no injection or icterus, oral mucosa moist, no oral lesions. No lymphadenopathy. No facial rash  CVS: RRR, no murmurs rubs or gallops. Equal 2+ distal pulses.   LUNGS: CTAB, no increased work of breathing  ABDOMEN:  soft NT/ND, +BS, no HSM  SKIN: No rashes or skin lesions. No nail findings  MSK:  TTP in paraspinal region, trapezius, shoulders, thigh and calves   Cervical spine: FROM  Hands: no synovitis in DIP, PIP and MCP, strong full fists  Wrist: FROM, no pain or swelling or warmth on palpation  Elbow: L elbow mild flexion contracture. R elbow FROM  Shoulders: b/l shoulders FROM   Hip: normal log roll, no lateral hip pain, MAI test negative b/l  Knees: R knee stable  Ankles: no swelling, TTP in b/l ankles   Feet: no pain with MTP squeeze, no toe swelling or pain or warmth on palpation with FROM  Spine: no lumbar or sacral pain on palpation.  NEURO: Cranial nerves II-XII intact grossly. 5/5 strength throughout in both upper and lower extremities, sensation intact.  PSYCH: normal mood       LABS:     May 2019:  Vitamin D 48, Hgb 9.2  RF 17.9, ESR 26, CRP 6.9 mg/L    Previous rheumatologist  Synovial fluid removed showing cell count of 38,405, nucleated cells 83%. Negative for crystals     Imaging:     None    ASSESSMENT/PLAN:     Seropositive RA (+RF and CCP)  - had b/l TKR but now having pain and stiffness in her hands, fingers and even her ankles.  She was given a Medrol Dosepak during her last visit which helped  - she had a +quant TB in 2017 but treated with rifampin for 4 mos in Jan 2018, will need annual CXR to monitor  - she was on Humira initially was working but stopped  working  - had injection site with Enbrel  - Continue methotrexate 8 pills weekly and folic acid daily and  mg daily  - continues to have a lot of pain but mostly in the muscles, no swelling or synovitis noted on exam   - Patient will see ophthalmology Apri l23, 2024  - reviewed blood work with patient, blood work every 3 mos     Osteoporosis screening  - plan to get bone density   - she will take calcium 600 mg daily and vitam d 2000 units daily    Myofascial pain syndrome  - having worsening muscle pain  - plan to increase gabapentin 600 mg TID    B/L TKR  - still having some knee pain, advised to see orthopedic     +Quant TB  - treated in Jan 2018    Spent 40 minutes obtaining history, evaluating patient, reviewing labs, discussing treatment options and completing documentation    Pt will f/u in 3-4 mos     Blanca Castaneda MD  2/23/2024  9:50 AM

## 2024-02-23 NOTE — PATIENT INSTRUCTIONS
You were seen today for Rheumatoid arthritis  Plan to get yo approved for Cimzia injection  Continue methotrexate and plaquinel  See eye dr in April   Also get blood work  Get bone density  Take calcium 600 mg daily and vitam D 2000 units daily  See me in 3-4 mos

## 2024-02-26 ENCOUNTER — TELEPHONE (OUTPATIENT)
Dept: RHEUMATOLOGY | Facility: CLINIC | Age: 69
End: 2024-02-26

## 2024-02-26 NOTE — TELEPHONE ENCOUNTER
PA Denied    Prior authorization for: Cimzia    Medication form: 400mg syringe    Date received: 2/24/24    Tracking #: [id number]    Denial reason:   Missing CDAI or RAPID 3 score  Trial and failure to Xeljanz/XeljanzXR which is covered by the plan with a PA     Next steps: Provider to review and advise.

## 2024-02-26 NOTE — TELEPHONE ENCOUNTER
Lyndsey from [employer] [facility] lab wanted to know if we received lab results faxed over this morning. Please advise

## 2024-02-27 ENCOUNTER — TELEPHONE (OUTPATIENT)
Dept: RHEUMATOLOGY | Facility: CLINIC | Age: 69
End: 2024-02-27

## 2024-02-27 NOTE — TELEPHONE ENCOUNTER
If we can get patient approved for Xeljanz.  If patient can be known that Cimzia was not approved and will be getting her approved for Xeljanz

## 2024-02-27 NOTE — TELEPHONE ENCOUNTER
Received PA forms; completed for Xeljanz XR. Forms signed by provider and faxed to [facility].

## 2024-02-27 NOTE — TELEPHONE ENCOUNTER
PA Approved - Script pended; routed for MD review and signature.     Prior authorization for: Xeljanz XR    Medication form: 11mg tablet    Date received: 2/27/24    Approval #: [id number]    Approved dates: 02/27/2024 until 08/25/2024    Pharmacy for medication: Not identified. Called [place] [facility] at [phone] and spoke to Neva. She directed that script be sent to [facility].    QF-TB results: NONE ON FILE

## 2024-02-28 RX ORDER — TOFACITINIB 11 MG/1
11 TABLET, FILM COATED, EXTENDED RELEASE ORAL DAILY
Qty: 30 TABLET | Refills: 3 | Status: SHIPPED | OUTPATIENT
Start: 2024-02-28

## 2024-04-10 ENCOUNTER — HOSPITAL ENCOUNTER (OUTPATIENT)
Dept: BONE DENSITY | Age: 69
Discharge: HOME | End: 2024-04-10
Attending: INTERNAL MEDICINE
Payer: MEDICAID

## 2024-04-10 DIAGNOSIS — Z13.820 OSTEOPOROSIS SCREENING: ICD-10-CM

## 2024-04-10 PROCEDURE — 77080 DXA BONE DENSITY AXIAL: CPT | Performed by: INTERNAL MEDICINE

## 2024-04-23 ENCOUNTER — OFFICE VISIT (OUTPATIENT)
Dept: OPHTHALMOLOGY | Facility: CLINIC | Age: 69
End: 2024-04-23

## 2024-04-23 ENCOUNTER — TELEPHONE (OUTPATIENT)
Dept: RHEUMATOLOGY | Facility: CLINIC | Age: 69
End: 2024-04-23

## 2024-04-23 DIAGNOSIS — H25.13 AGE-RELATED NUCLEAR CATARACT OF BOTH EYES: ICD-10-CM

## 2024-04-23 DIAGNOSIS — H43.393 VITREOUS FLOATERS OF BOTH EYES: ICD-10-CM

## 2024-04-23 DIAGNOSIS — Z79.899 LONG-TERM USE OF PLAQUENIL: Primary | ICD-10-CM

## 2024-04-23 DIAGNOSIS — E11.9 TYPE 2 DIABETES MELLITUS WITHOUT RETINOPATHY (HCC): ICD-10-CM

## 2024-04-23 PROCEDURE — 92015 DETERMINE REFRACTIVE STATE: CPT | Performed by: OPHTHALMOLOGY

## 2024-04-23 PROCEDURE — 92004 COMPRE OPH EXAM NEW PT 1/>: CPT | Performed by: OPHTHALMOLOGY

## 2024-04-23 NOTE — PROGRESS NOTES
Kat Lara is a 68 year old female.    HPI:     HPI    Pt. Here for a DM and Plaquenil eye exam.  States is on Plaquenil for 3-4 years.   C/O blurry vision both for distance and near even with the glasses on since 4-5 months.  Wears progressive glasses that are a year old.   C/O FBS, itching and crusty eyelids on waking up in the morning.   No H/O prior eye Sx/trauma/CL wear.   Last DFE a year ago.   Using iVizia eye drops 2 times a day.     Pt has been a diabetic for 5 years       Pt's diabetes is currently controlled by pills.  Pt does not monitor BS regularly at home.   Pt's last blood sugar was  96 on 07/05/23  Last HA1C was 5.9 on 07/05/23  Endocrinologist: None        Last edited by Gay Wolf OT on 4/23/2024  3:42 PM.        Patient History:  Past Medical History:    Adenomatosis, biliary    Diabetes (HCC)    Esophageal reflux    Essential hypertension    High blood pressure    OA (osteoarthritis)    RA (rheumatoid arthritis) (HCC)    Visual impairment    glasses       Surgical History: Kat Lara has a past surgical history that includes hysterectomy; tubal ligation; colonoscopy (N/A, 10/7/2020) (Procedure: COLONOSCOPY;  Surgeon: Keshav Saucedo MD;  Location: [facility] ENDOSCOPY); cholecystectomy (03/25/2021); total knee replacement (Right, 05/23/2022); and knee replacement surgery (Right, 05/23/2022).    Family History   Problem Relation Age of Onset    Diabetes Mother     Hypertension Mother     No Known Problems Father     Diabetes Sister     Diabetes Brother     Glaucoma Neg     Macular degeneration Neg        Social History:   Social History     Socioeconomic History    Marital status:     Number of children: 3   Tobacco Use    Smoking status: Never    Smokeless tobacco: Never   Vaping Use    Vaping status: Never Used   Substance and Sexual Activity    Alcohol use: Not Currently    Drug use: Never     Social Determinants of Health     Financial Resource  Strain: Low Risk  (9/28/2021)    [facility]    Overall Financial Resource Strain (CARDIA)     Difficulty of Paying Living Expenses: Not very hard   Food Insecurity: No Food Insecurity (11/1/2021)    [facility]    Food Insecurity     Within the past 30 days, I worried whether my food would run out before I got money to buy more. / En los últimos 30 días, me preocupó que la comida se podía acabar antes de tener dinero para compr...: Never true / Nunca     Within the past 30 days, the food that I bought just didn't last, and I didn't have money to get more. / En los últimos 30 días, La comida que compré no rindió lo suficiente, y no tenía dinero para...: Never true / Nunca   Housing Stability: Low Risk  (9/28/2021)    [facility]    Housing Stability Vital Sign     Unable to Pay for Housing in the Last Year: No     Number of Places Lived in the Last Year: 1     Unstable Housing in the Last Year: No       Medications:  Current Outpatient Medications   Medication Sig Dispense Refill    Tofacitinib Citrate ER (XELJANZ XR) 11 MG Oral Tablet 24 Hr Take 11 mg by mouth daily. 30 tablet 3    gabapentin 600 MG Oral Tab Take 1 tablet (600 mg total) by mouth 3 (three) times daily. 90 tablet 2    folic acid 1 MG Oral Tab Take 1 tablet (1 mg total) by mouth daily. 90 tablet 1    hydroxychloroquine 200 MG Oral Tab Take 2 tablets (400 mg total) by mouth daily. 180 tablet 1    methotrexate 2.5 MG Oral Tab Take 8 tablets (20 mg total) by mouth once a week. 103 tablet 1    CELECOXIB 200 MG Oral Cap TAKE 1 CAPSULE(200 MG) BY MOUTH DAILY (Patient not taking: Reported on 10/23/2023) 30 capsule 0    oxyCODONE-acetaminophen (PERCOCET) 5-325 MG Oral Tab Take 1 tablet by mouth every 6 (six) hours as needed for Pain. (Patient not taking:  Reported on 2/23/2024) 20 tablet 0    traMADol 50 MG Oral Tab Take 1 tablet (50 mg total) by mouth every 6 (six) hours as needed for Pain. No alcohol or driving on this med. Stop if lethargic or hallucinating. (Patient not taking: Reported on 7/31/2023) 20 tablet 0    amitriptyline 10 MG Oral Tab Take 1 tablet (10 mg total) by mouth nightly. (Patient not taking: Reported on 10/23/2023)      aspirin 325 MG Oral Tab Take 1 tablet (325 mg total) by mouth 2 (two) times daily. (Patient not taking: Reported on 10/23/2023) 28 tablet 0    polyethylene glycol, PEG 3350, 17 g Oral Powd Pack Take 17 g by mouth daily as needed. (Patient not taking: Reported on 7/31/2023) 24 each 0    losartan Potassium 50 MG Oral Tab Take by mouth daily.      FEROSUL 325 (65 Fe) MG Oral Tab       metFORMIN HCl 500 MG Oral Tab Take 1 tablet (500 mg total) by mouth daily.  1       Allergies:  Allergies   Allergen Reactions    Enalapril Coughing     cough       ROS:       PHYSICAL EXAM:     Base Eye Exam       Visual Acuity (Snellen - Linear)         Right Left    Dist cc 20/60 20/150    Dist ph cc 20/40 20/100    Near cc 20/100 20/70      Correction: Glasses              Tonometry (Icare, 3:32 PM)         Right Left    Pressure 13 15              Pupils         Pupils    Right PERRL    Left PERRL              Visual Fields         Left Right     Full Full              Extraocular Movement         Right Left     Full Full              Dilation       Both eyes: 1.0% Mydriacyl and 2.5% Vaibhav Synephrine @ 3:32 PM              Dilation #2       Both eyes: 1.0% Mydriacyl and 2.5% Vaibhav Synephrine @ 3:32 PM                  Additional Tests       Amsler         Right Left     Normal Normal   States its all blurry.              Color         Right Left    Ishihara 10/10 10/10                  Slit Lamp and Fundus Exam       Slit Lamp Exam         Right Left    Lids/Lashes Dermatochalasis, Meibomian gland dysfunction, Permanent eyeliner upper and lower  Dermatochalasis, Meibomian gland dysfunction, Permanent eyeliner upper and lower    Conjunctiva/Sclera Temp pinguecula Nasal/temp pinguecula    Cornea 1+ Arcus 1+ Arcus    Anterior Chamber Deep and quiet Deep and quiet    Iris Normal Normal    Lens 1+ Nuclear sclerosis 1+ Nuclear sclerosis    Vitreous Vitreous floaters Vitreous floaters              Fundus Exam         Right Left    Disc Good rim, Temporal crescent Good rim, Temporal crescent    C/D Ratio 0.3 0.3    Macula Normal-no BDR, no Plaquenil toxicity Normal-no BDR, no Plaquenil toxicity    Vessels Normal Normal    Periphery Normal Normal                  Lacrimal Exam       Schirmers         Right Left     11 mm 11 mm      Anesthesia: Yes                  Refraction       Wearing Rx         Sphere Cylinder Axis Add    Right +0.25 +1.25 174 +2.25    Left +0.25 +1.25 029 +2.25      Age: 1yr    Type: Progressive bifocal              Manifest Refraction (Auto)         Sphere Cylinder Irwin Dist VA Add Near VA    Right +1.00 +1.50 160       Left +0.50 +0.75 023                 Manifest Refraction #2         Sphere Cylinder Irwin Dist VA Add Near VA    Right +1.00 +1.25 160 20/50+ +2.75 20/40    Left +0.50 +1.00 030 20/100 +2.75 20/50              Manifest Refraction Comments    Refraction done for BCVA             Final Rx         Sphere Cylinder Irwin Dist VA Add Near VA    Right +1.00 +1.25 160 20/50+ +2.75 20/40    Left +0.50 +1.00 030 20/100 +2.75 20/50      Type: Progressive bifocal                     ASSESSMENT/PLAN:     Diagnoses and Plan:     Long-term use of Plaquenil   No evidence of plaquenil toxicity in either eye.  Will follow in 1 year for a plaquenil eye exam based on current guidelines.   Patient is approved to continue on plaquenil as directed by their PCP or rheumatologist.      Type 2 diabetes mellitus without retinopathy (HCC)  Diabetes type II: no background of retinopathy, no signs of neovascularization noted.  Discussed ocular and systemic  benefits of blood sugar control.  Diagnosis and treatment discussed in detail with patient.      Age-related nuclear cataract of both eyes  Discussed early cataracts with patient.  No treatment recommended at this time.     New glasses Rx given today, recommend update    Vitreous floaters of both eyes   There is no evidence of retinal pathology.  All signs and symptoms of retinal detachment/tears explained in detail.    Patient instructed to call the office if they experience increase in floaters, increase in flashes of light, loss of vision or curtain or veil effect.       No orders of the defined types were placed in this encounter.      Meds This Visit:  Requested Prescriptions      No prescriptions requested or ordered in this encounter        Follow up instructions:  Return in about 1 year (around 4/23/2025) for Plaquenil eye exam, Diabetic eye exam.    4/23/2024  Scribed by: Simeon Philippe MD

## 2024-04-23 NOTE — PATIENT INSTRUCTIONS
Long-term use of Plaquenil   No evidence of plaquenil toxicity in either eye.  Will follow in 1 year for a plaquenil eye exam based on current guidelines.   Patient is approved to continue on plaquenil as directed by their PCP or rheumatologist.      Type 2 diabetes mellitus without retinopathy (HCC)  Diabetes type II: no background of retinopathy, no signs of neovascularization noted.  Discussed ocular and systemic benefits of blood sugar control.  Diagnosis and treatment discussed in detail with patient.      Age-related nuclear cataract of both eyes  Discussed early cataracts with patient.  No treatment recommended at this time.     New glasses Rx given today, recommend update    Vitreous floaters of both eyes   There is no evidence of retinal pathology.  All signs and symptoms of retinal detachment/tears explained in detail.    Patient instructed to call the office if they experience increase in floaters, increase in flashes of light, loss of vision or curtain or veil effect.

## 2024-04-23 NOTE — TELEPHONE ENCOUNTER
If you can let patient know that her blood work from February showed normal CBC, white count, platelets.  Hemoglobin slightly low at 10.6 but this is chronic.  Her kidney and liver test were normal.    Bone density did show osteoporosis in her lumbar spine.  This does not cause any pain but can increase her risk of fracturing.  She has an appointment with me in May, we will discuss medications options at that time

## 2024-04-23 NOTE — TELEPHONE ENCOUNTER
Patient came in asking about her lab results from 2/2024 that she got done at [facility] in [place].(Scanned in Media) and also her Bone density scan done on 4/10/24.Please advice

## 2024-04-23 NOTE — ASSESSMENT & PLAN NOTE
No evidence of plaquenil toxicity in either eye.  Will follow in 1 year for a plaquenil eye exam based on current guidelines.   Patient is approved to continue on plaquenil as directed by their PCP or rheumatologist.

## 2024-04-23 NOTE — ASSESSMENT & PLAN NOTE
Discussed early cataracts with patient.  No treatment recommended at this time.     New glasses Rx given today, recommend update

## 2024-04-24 NOTE — TELEPHONE ENCOUNTER
Phoned pt; verified name and . Reviewed lab and bone density results with pt and Dr Castaneda's plan to discuss further during next office visit.     Pt then asked why she never heard anything since last office visit about shots Dr Castaneda was recommending. Informed pt we did a PA for Cimzia and it was denied then got Xeljanz approved. Informed pt office reached out to her via phone and mail regarding this issue and got no response.    Pt then went on to say while in [facility] building yesterday, she presented new insurance with [facility]. Pt states  told her this new insurance plan would cover RA meds. Briefly explained PA process to pt and informed her I would consult with Dr Castaneda now to identify what drug we should request PA on from new insurance. Informed pt we would keep her informed of the outcome.

## 2024-04-25 ENCOUNTER — TELEPHONE (OUTPATIENT)
Dept: RHEUMATOLOGY | Facility: CLINIC | Age: 69
End: 2024-04-25

## 2024-04-25 DIAGNOSIS — M05.9 SEROPOSITIVE RHEUMATOID ARTHRITIS (HCC): Primary | ICD-10-CM

## 2024-04-25 NOTE — TELEPHONE ENCOUNTER
PA start    Prior authorization for: Cimzia loading and maintenance    Medication form: 400mg    Submission method: Forms completed and faxed to [facility] with clinical notes.    Spoke with (if by phone):     Date submitted: 4/25/2024    Tracking #:    QF-TB result: Hx TB + and treated with Rifampin. Per Dr Castaneda, will do chest xray next office visit.

## 2024-04-26 RX ORDER — CERTOLIZUMAB PEGOL 200 MG/ML
400 INJECTION, SOLUTION SUBCUTANEOUS
Qty: 1 EACH | Refills: 0 | Status: SHIPPED | OUTPATIENT
Start: 2024-04-26

## 2024-04-26 NOTE — TELEPHONE ENCOUNTER
PA Approved - Script pended; routed for MD review and signature. Will pt need teaching?    Prior authorization for: Cimzia    Medication form: 400mg    Date received: 4/25/24    Approval #: [id number]    Approved dates: 04/01/2024 - 04/25/2025    Pharmacy for medication: [facility]    QF-TB results:   Hx TB + and treated with Rifampin. Per Dr Castaneda, will do chest xray next office visit.

## 2024-04-30 DIAGNOSIS — M25.562 CHRONIC PAIN OF BOTH KNEES: ICD-10-CM

## 2024-04-30 DIAGNOSIS — M05.9 SEROPOSITIVE RHEUMATOID ARTHRITIS (HCC): ICD-10-CM

## 2024-04-30 DIAGNOSIS — M25.561 CHRONIC PAIN OF BOTH KNEES: ICD-10-CM

## 2024-04-30 DIAGNOSIS — R76.12 POSITIVE QUANTIFERON-TB GOLD TEST: ICD-10-CM

## 2024-04-30 DIAGNOSIS — Z51.81 THERAPEUTIC DRUG MONITORING: ICD-10-CM

## 2024-04-30 DIAGNOSIS — G89.29 CHRONIC PAIN OF BOTH KNEES: ICD-10-CM

## 2024-04-30 RX ORDER — FOLIC ACID 1 MG/1
1 TABLET ORAL DAILY
Qty: 90 TABLET | Refills: 1 | Status: SHIPPED | OUTPATIENT
Start: 2024-04-30

## 2024-04-30 NOTE — TELEPHONE ENCOUNTER
LOV: 2/23/24  Last Refilled:#90, 1rf 10/23/23    ASSESSMENT/PLAN:      Seropositive RA (+RF and CCP)  - had b/l TKR but now having pain and stiffness in her hands, fingers and even her ankles.  She was given a Medrol Dosepak during her last visit which helped  - she had a +quant TB in 2017 but treated with rifampin for 4 mos in Jan 2018, will need annual CXR to monitor  - she was on Humira initially was working but stopped working  - had injection site with Enbrel  - Continue methotrexate 8 pills weekly and folic acid daily and  mg daily  - continues to have a lot of pain but mostly in the muscles, no swelling or synovitis noted on exam   - Patient will see ophthalmology Apri l23, 2024  - reviewed blood work with patient, blood work every 3 mos      Osteoporosis screening  - plan to get bone density   - she will take calcium 600 mg daily and vitam d 2000 units daily     Myofascial pain syndrome  - having worsening muscle pain  - plan to increase gabapentin 600 mg TID     B/L TKR  - still having some knee pain, advised to see orthopedic      +Quant TB  - treated in Jan 2018     Spent 40 minutes obtaining history, evaluating patient, reviewing labs, discussing treatment options and completing documentation     Pt will f/u in 3-4 mos      Blanca Castaneda MD  2/23/2024  9:50 AM     Please advise.

## 2024-05-16 NOTE — TELEPHONE ENCOUNTER
Phoned pt; verified name and . Inquired if pt got message to call us regarding Cimzia and asked if she had heard from [facility] yet. She said no to both.    Informed pt insurance approved Cimzia through 25 and script has been sent to [facility]. Provided pt with phone number for [facility] and directed her to call today regarding possible co-pay and to set-up delivery. Reviewed administration schedule for loading and maintenance doses; pt repeated back the schedule. Explained each dose is 2 injections and Cimzia will be in a syringe, not a pen like Enbrel. Offered in-office teaching; pt has done injections before so just wanted review over the phone. Pt states she will call if she has any questions.

## 2024-05-24 ENCOUNTER — HOSPITAL ENCOUNTER (OUTPATIENT)
Dept: MAMMOGRAPHY | Age: 69
Discharge: HOME | End: 2024-05-24
Attending: FAMILY MEDICINE

## 2024-05-24 DIAGNOSIS — Z12.31 ENCOUNTER FOR SCREENING MAMMOGRAM FOR MALIGNANT NEOPLASM OF BREAST: ICD-10-CM

## 2024-05-24 PROCEDURE — 77063 BREAST TOMOSYNTHESIS BI: CPT | Performed by: FAMILY MEDICINE

## 2024-05-24 PROCEDURE — 77067 SCR MAMMO BI INCL CAD: CPT | Performed by: FAMILY MEDICINE

## 2024-06-18 ENCOUNTER — LAB ENCOUNTER (OUTPATIENT)
Dept: LAB | Facility: HOSPITAL | Age: 69
End: 2024-06-18
Attending: INTERNAL MEDICINE

## 2024-06-18 ENCOUNTER — TELEPHONE (OUTPATIENT)
Dept: RHEUMATOLOGY | Facility: CLINIC | Age: 69
End: 2024-06-18

## 2024-06-18 ENCOUNTER — OFFICE VISIT (OUTPATIENT)
Dept: RHEUMATOLOGY | Facility: CLINIC | Age: 69
End: 2024-06-18

## 2024-06-18 ENCOUNTER — HOSPITAL ENCOUNTER (OUTPATIENT)
Dept: GENERAL RADIOLOGY | Facility: HOSPITAL | Age: 69
Discharge: HOME | End: 2024-06-18
Attending: INTERNAL MEDICINE

## 2024-06-18 VITALS
HEIGHT: 63 IN | WEIGHT: 152 LBS | SYSTOLIC BLOOD PRESSURE: 120 MMHG | BODY MASS INDEX: 26.93 KG/M2 | DIASTOLIC BLOOD PRESSURE: 70 MMHG | HEART RATE: 62 BPM

## 2024-06-18 DIAGNOSIS — M25.562 CHRONIC PAIN OF BOTH KNEES: ICD-10-CM

## 2024-06-18 DIAGNOSIS — M25.561 CHRONIC PAIN OF BOTH KNEES: ICD-10-CM

## 2024-06-18 DIAGNOSIS — G89.29 CHRONIC BILATERAL LOW BACK PAIN WITHOUT SCIATICA: ICD-10-CM

## 2024-06-18 DIAGNOSIS — Z51.81 THERAPEUTIC DRUG MONITORING: ICD-10-CM

## 2024-06-18 DIAGNOSIS — R76.12 POSITIVE QUANTIFERON-TB GOLD TEST: ICD-10-CM

## 2024-06-18 DIAGNOSIS — M54.50 CHRONIC BILATERAL LOW BACK PAIN WITHOUT SCIATICA: ICD-10-CM

## 2024-06-18 DIAGNOSIS — M05.9 SEROPOSITIVE RHEUMATOID ARTHRITIS (HCC): Primary | ICD-10-CM

## 2024-06-18 DIAGNOSIS — G89.29 CHRONIC PAIN OF BOTH KNEES: ICD-10-CM

## 2024-06-18 DIAGNOSIS — Z13.820 OSTEOPOROSIS SCREENING: ICD-10-CM

## 2024-06-18 DIAGNOSIS — M05.9 SEROPOSITIVE RHEUMATOID ARTHRITIS (HCC): ICD-10-CM

## 2024-06-18 LAB
ALBUMIN SERPL-MCNC: 4.1 G/DL (ref 3.2–4.8)
ALT SERPL-CCNC: 14 U/L
AST SERPL-CCNC: 16 U/L (ref ?–34)
BASOPHILS # BLD AUTO: 0.02 X10(3) UL (ref 0–0.2)
BASOPHILS NFR BLD AUTO: 0.4 %
CREAT BLD-MCNC: 1.2 MG/DL
CRP SERPL-MCNC: <0.4 MG/DL (ref ?–1)
DEPRECATED RDW RBC AUTO: 53 FL (ref 35.1–46.3)
EGFRCR SERPLBLD CKD-EPI 2021: 50 ML/MIN/1.73M2 (ref 60–?)
EOSINOPHIL # BLD AUTO: 0.11 X10(3) UL (ref 0–0.7)
EOSINOPHIL NFR BLD AUTO: 2 %
ERYTHROCYTE [DISTWIDTH] IN BLOOD BY AUTOMATED COUNT: 14.1 % (ref 11–15)
ERYTHROCYTE [SEDIMENTATION RATE] IN BLOOD: 9 MM/HR
HCT VFR BLD AUTO: 35.1 %
HGB BLD-MCNC: 11.4 G/DL
IMM GRANULOCYTES # BLD AUTO: 0.02 X10(3) UL (ref 0–1)
IMM GRANULOCYTES NFR BLD: 0.4 %
LYMPHOCYTES # BLD AUTO: 1.61 X10(3) UL (ref 1–4)
LYMPHOCYTES NFR BLD AUTO: 28.8 %
MCH RBC QN AUTO: 33.7 PG (ref 26–34)
MCHC RBC AUTO-ENTMCNC: 32.5 G/DL (ref 31–37)
MCV RBC AUTO: 103.8 FL
MONOCYTES # BLD AUTO: 0.54 X10(3) UL (ref 0.1–1)
MONOCYTES NFR BLD AUTO: 9.6 %
NEUTROPHILS # BLD AUTO: 3.3 X10 (3) UL (ref 1.5–7.7)
NEUTROPHILS # BLD AUTO: 3.3 X10(3) UL (ref 1.5–7.7)
NEUTROPHILS NFR BLD AUTO: 58.8 %
PLATELET # BLD AUTO: 153 10(3)UL (ref 150–450)
RBC # BLD AUTO: 3.38 X10(6)UL
VIT D+METAB SERPL-MCNC: 57.8 NG/ML (ref 30–100)
WBC # BLD AUTO: 5.6 X10(3) UL (ref 4–11)

## 2024-06-18 PROCEDURE — 99215 OFFICE O/P EST HI 40 MIN: CPT | Performed by: INTERNAL MEDICINE

## 2024-06-18 PROCEDURE — 84450 TRANSFERASE (AST) (SGOT): CPT

## 2024-06-18 PROCEDURE — 84460 ALANINE AMINO (ALT) (SGPT): CPT

## 2024-06-18 PROCEDURE — 85652 RBC SED RATE AUTOMATED: CPT | Performed by: INTERNAL MEDICINE

## 2024-06-18 PROCEDURE — 85025 COMPLETE CBC W/AUTO DIFF WBC: CPT | Performed by: INTERNAL MEDICINE

## 2024-06-18 PROCEDURE — 36415 COLL VENOUS BLD VENIPUNCTURE: CPT | Performed by: INTERNAL MEDICINE

## 2024-06-18 PROCEDURE — 82040 ASSAY OF SERUM ALBUMIN: CPT

## 2024-06-18 PROCEDURE — 82565 ASSAY OF CREATININE: CPT

## 2024-06-18 PROCEDURE — 86140 C-REACTIVE PROTEIN: CPT | Performed by: INTERNAL MEDICINE

## 2024-06-18 PROCEDURE — 82306 VITAMIN D 25 HYDROXY: CPT

## 2024-06-18 PROCEDURE — 72110 X-RAY EXAM L-2 SPINE 4/>VWS: CPT | Performed by: INTERNAL MEDICINE

## 2024-06-18 RX ORDER — ALENDRONATE SODIUM 70 MG/1
70 TABLET ORAL WEEKLY
Qty: 12 TABLET | Refills: 3 | Status: SHIPPED | OUTPATIENT
Start: 2024-06-18

## 2024-06-18 RX ORDER — METHOTREXATE 2.5 MG/1
20 TABLET ORAL WEEKLY
Qty: 103 TABLET | Refills: 1 | Status: SHIPPED | OUTPATIENT
Start: 2024-06-18

## 2024-06-18 RX ORDER — VALSARTAN AND HYDROCHLOROTHIAZIDE 320; 25 MG/1; MG/1
1 TABLET, FILM COATED ORAL EVERY MORNING
COMMUNITY
Start: 2024-06-13

## 2024-06-18 RX ORDER — HYDROXYCHLOROQUINE SULFATE 200 MG/1
400 TABLET, FILM COATED ORAL DAILY
Qty: 180 TABLET | Refills: 1 | Status: SHIPPED | OUTPATIENT
Start: 2024-06-18

## 2024-06-18 RX ORDER — GABAPENTIN 600 MG/1
600 TABLET ORAL 3 TIMES DAILY
Qty: 90 TABLET | Refills: 2 | Status: SHIPPED | OUTPATIENT
Start: 2024-06-18

## 2024-06-18 NOTE — PATIENT INSTRUCTIONS
Seen today for rheumatoid arthritis  Continue methotrexate and hydroxychloroquine  Your bone density also showed osteoporosis which causes weak bones and leads to fractures  Plan to start you on Fosamax every week.  This medication is taken only once a week  For the lower back get x-ray  I referred you to physical therapy  Blood work today  Follow-up in the next 4 months

## 2024-06-18 NOTE — TELEPHONE ENCOUNTER
LM on identified voicemail informing pt that [facility] reports they have Cimzia orders, they have an authorization and they have tried to reach her for delivery. Provided pt with phone number to call [facility] to schedule delivery.

## 2024-06-18 NOTE — TELEPHONE ENCOUNTER
Noted    Occupational Therapy Visit    Referred by: Maureen Boykin, *; Medical Diagnosis (from order):    Diagnosis Information      Diagnosis    315.8 (ICD-9-CM) - F88 (ICD-10-CM) - Global developmental delay              Visit: Visit count could not be calculated. Make sure you are using a visit which is associated with an episode.    Visit Type: Daily Treatment Note    SUBJECTIVE                                                                                                             Present and reporting subjective information: mother  Participation in [employer] with OT, BT, and SW present    Location:  Onsite  Time in: 9am Time out: 11am  Functional Change: Sitting independently for structured tasks  I at schedule with transition card   Improved parallel play   improved ability to wait  Using PECS for communication    OBJECTIVE                                                                                                                    Observation:   Behavior: follows commands inconsistently     Hand Dominance: right-handed;     Movement:  • Comments: Strengths noted in opening and closing twist of caps and puzzle completion       Sensory:    : frequent closing eyes.   - Tactile: withdraws from touch   - Oral/Gustatory: mouth fingers or thumb and mouths or chews on non-food objects   - Proprioception: demonstrates poor body awareness, craves activities that involve proprioceptive input  (i.e. crashing, jumping, rough housing) and chews on toys, clothes, or other objects more than other children (flapping)  Continued engagement in visual perspective taking                     TREATMENT                                                                                                                  Therapeutic Activity:  Sensory Play: slide, swing, water play  [place] time: book/song  Motor play: tricycle and standing scooter  Art Project: finger painting  Toy time: car ramp, reciprocal game  Snack:  PECS    Skilled input: verbal instruction/cues, inhibition and facilitation    Home Exercise Program: PECS  Encouraging bilateral work          ASSESSMENT                                                                                                               Tank showed improved functional play with water and car ramp and improved ability to participate in a reciprocal game. He continues to get frustrated with PECS, most notable at craft and snack on this day. Increase of hitting and scratching, also endorsed by parents.   Education:   - Results of above outlined education: Verbalizes understanding and Demonstrates understanding      PLAN                                                                                                                           Suggestions for next session as indicated: Progress per plan of care        GOALS                                                                                                                           Long Term Goals: to be met by end of plan of care  1. NOT GAS goal appropriate. Child is participating in Early Intervention, see IFSP      Therapy procedure time and total treatment time can be found documented on the Time Entry flowsheet

## 2024-06-18 NOTE — TELEPHONE ENCOUNTER
Looks like patient was approved for Cimzia back in April but when I saw her today she states that she was not approved.  Can we find out if she was approved for Cimzia

## 2024-06-18 NOTE — PROGRESS NOTES
Adult Annual Physical  Name: Wing Stein      : 1995      MRN: [id number]  Encounter Provider: DANILO Meneses  Encounter Date: 2024   Encounter department: [facility]    Assessment & Plan   1. Annual physical exam  Assessment & Plan:  Routine health labs ordered   Recommend vision and dental exam  Orders:  -     Lipid Panel with Direct LDL reflex; Future  -     Comprehensive metabolic panel  -     CBC and differential; Future  -     TSH, 3rd generation with Free T4 reflex; Future  2. Screening for HIV (human immunodeficiency virus)  -     HIV 1/2 AG/AB w Reflex [facility] for 2 yr old and above; Future  3. Encounter for hepatitis C screening test for low risk patient  -     Hepatitis C antibody; Future  4. Bunion of left foot  Assessment & Plan:  Will get xray   Refer to podiatry  Has already been doing medication. Change of shoes and inserts all without relief  Was told needed surgery before- many years ago   Orders:  -     XR foot 3+ vw left; Future; Expected date: 2024  -     Ambulatory Referral to Podiatry; Future  5. Chronic tension-type headache, not intractable  Assessment & Plan:  Recommend patient have vision exam as this can be reason for headaches and dizziness   Would like patient to start magnesium and vitamin b2 and start journaling headaches  Can continue to take tylenol since provides relief   Orders:  -     magnesium Oxide (MAG-OX) 400 mg TABS; Take 1 tablet (400 mg total) by mouth 2 (two) times a day  -     Riboflavin 100 MG TABS; Take 2 tablets (200 mg total) by mouth 2 (two) times a day  6. Smoking  Assessment & Plan:  Patient advised to completely quit smoking   7. Dizziness  Assessment & Plan:  Unspecified. Not associated with other symptoms. Can be related to headaches  Advised to monitor and make sure eating and drinking well  Has elevated sugar in hospital last year. New labs ordered to check secondary causes.  Also advised to have eye  Called patient using lang. Alejandra Gage ID: [id number]. Name and  verified. Patient informed of Dr. Denise Cristobal note. She verbalized understanding. exam completed.   8. Skin mass  Assessment & Plan:  Patient has multiple skin masses.   Left back, right back lumbar   Left abdominal wall, bilateral upper abdominal wall  Behind right knee   Some are painful   Will refer to general surgery   Orders:  -     Ambulatory Referral to General Surgery; Future  9. Finger joint swelling, left  Assessment & Plan:  Injury one year ago cut himself with knife. He was sonly sutrued up but since his left pinky finger gets swollen and at times doesn't work to flex the right way.   Will refer to hand surgery but given time since injury not sure what else they can provide.   Possible early trigger finger as well  Xray ordered since acute swelling   Orders:  -     XR finger left fifth digit-pinkie; Future; Expected date: 06/18/2024  -     Ambulatory Referral to Orthopedic Surgery; Future  Immunizations and preventive care screenings were discussed with patient today. Appropriate education was printed on patient's after visit summary.    Counseling:  Alcohol/drug use: discussed moderation in alcohol intake, the recommendations for healthy alcohol use, and avoidance of illicit drug use.  Dental Health: discussed importance of regular tooth brushing, flossing, and dental visits.  Injury prevention: discussed safety/seat belts, safety helmets, smoke detectors, carbon dioxide detectors, and smoking near bedding or upholstery.  Sexual health: discussed sexually transmitted diseases, partner selection, use of condoms, avoidance of unintended pregnancy, and contraceptive alternatives.  Exercise: the importance of regular exercise/physical activity was discussed. Recommend exercise 3-5 times per week for at least 30 minutes.       Depression Screening and Follow-up Plan: Patient was screened for depression during today's encounter. They screened negative with a PHQ-2 score of 0.    Tobacco Cessation Counseling: Tobacco cessation counseling was provided. The patient is sincerely urged to quit  "consumption of tobacco. He is not ready to quit tobacco.         History of Present Illness   {Disappearing Hyperlinks I Encounters * My Last Note * Since Last Visit * History :89871}  Adult Annual Physical:  Patient presents for annual physical. Patient reports he having issues with dizziness, headaches. This has been occurring for the last 4 years at least. He is having headaches 2-3 times per week. Sometimes he has dizziness with these headache. He will take tylenol to relieve his headache which does provide relief. When he has dizzy episodes they are not associated with symptoms but feels like he is the one moving.   He plays softball.   He eats and drinks normally.   He also has cyst that are bothering him at times   Left foot pain for bunion was suppose to have surgery but had to return back to [place] never had this done- this was many years. 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     Injury one year ago cut himself with knife. He was only sutrued up but since his left pinky finger gets swollen and at times doesn't work to flex the right way. .     Diet and Physical Activity:  - Diet/Nutrition: well balanced diet.  - Exercise: moderate cardiovascular exercise and 3-4 times a week on average. plays softball    Depression Screening:  - PHQ-2 Score: 0    General Health:  - Sleep: sleeps well. works nightshift  - Hearing: normal hearing bilateral ears.  - Vision: vision problems and most recent eye exam > 1 year ago.  - Dental: no dental visits for > 1 year and brushes teeth once daily.     Health:    - Urinary symptoms: none.     Review of Systems      Objective   {Disappearing Hyperlinks   Review Vitals * Enter New Vitals * Results Review * Labs * Imaging * Cardiology * Procedures * Lung Cancer Screening :28674}  /80 (BP Location: Right arm, Patient Position: Sitting, Cuff Size: Standard)   Pulse 85   Temp (!) 96.7 °F (35.9 °C) (Tympanic)   Ht 5' 8\" (1.727 m)   Wt 92.6 kg " (204 lb 3.2 oz)   SpO2 97%   BMI 31.05 kg/m²     Physical Exam  Vitals and nursing note reviewed.   Constitutional:       General: He is not in acute distress.     Appearance: He is well-developed. He is not ill-appearing, toxic-appearing or diaphoretic.   HENT:      Head: Normocephalic and atraumatic.      Right Ear: Tympanic membrane and external ear normal.      Left Ear: Tympanic membrane and external ear normal.      Nose: Nose normal.      Mouth/Throat:      Mouth: Mucous membranes are moist.      Pharynx: Uvula midline. No oropharyngeal exudate or posterior oropharyngeal erythema.   Eyes:      General: No scleral icterus.     Extraocular Movements: Extraocular movements intact.      Right eye: No nystagmus.      Left eye: No nystagmus.      Conjunctiva/sclera: Conjunctivae normal.      Pupils: Pupils are equal, round, and reactive to light.   Neck:      Thyroid: No thyromegaly.      Vascular: No carotid bruit or JVD.   Cardiovascular:      Rate and Rhythm: Normal rate and regular rhythm.      Pulses:           Carotid pulses are 2+ on the right side and 2+ on the left side.     Heart sounds: Normal heart sounds.   Pulmonary:      Effort: Pulmonary effort is normal. No respiratory distress.      Breath sounds: Normal breath sounds.   Abdominal:      General: Bowel sounds are normal. There is no distension.      Palpations: Abdomen is soft.      Tenderness: There is no abdominal tenderness.   Musculoskeletal:      Left hand: Swelling (5th finger) and tenderness present. Decreased range of motion.      Cervical back: Normal range of motion.      Right lower leg: No edema.      Left lower leg: No edema.   Lymphadenopathy:      Cervical: No cervical adenopathy.   Skin:     General: Skin is warm and dry.      Capillary Refill: Capillary refill takes less than 2 seconds.      Comments: Multiple small tender skin mass bilateral back, abdomen and behind right knee    Neurological:      Mental Status: He is alert and  oriented to person, place, and time.      Motor: Motor function is intact.      Gait: Gait is intact. Gait normal.   Psychiatric:         Attention and Perception: Attention normal.         Mood and Affect: Mood normal.         Speech: Speech normal.         Behavior: Behavior normal. Behavior is cooperative.         Thought Content: Thought content normal.         Administrative Statements {Disappearing Hyperlinks I  [facility]}

## 2024-06-18 NOTE — TELEPHONE ENCOUNTER
Called [facility]; spoke with Maynor. She states they have active orders for Cimzia and no issues with PA for this pt. [facility] reached out to pt on 5/13, 5/14/ and 5/15 regarding delivery with no response from pt.

## 2024-06-18 NOTE — PROGRESS NOTES
Kat Lara is a 68 year old female.    HPI:     Chief Complaint   Patient presents with    Rheumatoid Arthritis    Back Pain     Lower back pain      I had the pleasure of seeing Kat Lara on 6/18/2024 for follow up Seropositve RA (+RF and CCP).    Current Medications:  MTX 8 pills weekly and FA daily   mg daily- started 5/2023  Celebrex 200 mg daily  Gabapentin 600 mg TID  Elavil 10 mg daily   Previous medications:  Humira evrey 2 weeks- started Aug 2020- 8/2023  Enbrel weekly- started 8/2023- 12/2023 stopped due to injection site reaction  Blood work:  Vitamin D 48, Hgb 9.2  ABIMBOLA 1:1280  RF 17.9,  ESR 26, CRP 6.9 mg/L  +Quant TB- treated with Rifampin in Jan 2018  Synovial fluid cell count of 38,405, no crystals  Bone density 4/2024  LFN T-score    -2.2  LTH                 -1.7  LS                    -3.3    Interval History:  This is a 63 yo [place] 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   She was then seen by rheumatologist Dr. Falguni Owusu and diagnosed with RA.  She is found to have elevated RF and inflammatory markers.  Started on methotrexate 6 pills weekly and meloxicam 15 mg daily in the summer 2018  She states that the medications are not working.  She also had her right shoulder and knee injected with steroids.  She continues to complain of right shoulder, knee and ankle pain.  She has to use a walker right now due to the pain.  She also has intermittent swelling in her knee and ankle.    8/13/2021:  Presents for follow-up of seropositive RA  Currently on Humira every 2 weeks and methotrexate  Joints are doing very well.  Reports some pain in her left elbow but no swelling  Continues to have bilateral knee pain.  She has had cortisone injections and gel injections with minimal improvement.  During her last visit she was given referral to orthopedic but has not seen them  Denies any other joint pain or  swelling.  Recent blood work reviewed, inflammatory markers are normal    11/19/2021:  Presents for follow-up of seropositive RA  Currently on methotrexate 6 pills weekly  Has not been taking Humira for the past month  Since stopped taking Humira knee pain is better  XR L knee shows progressive severe OA of the medial compartment  XR R knee shows moderate narrowing of medial and lateral cmpts  Still has not seen orthopedic, have given her 2 referrals already  Has some pain and swelling in ankles otherwise no pain in hands, elbows or shoulders  Reports some lower back pain mostly on the R side no radiculopathy symptoms     2/18/2022:  Presents for follow-up of seropositive RA  Currently on methotrexate 6 pills weekly  She was seen by orthopedic recently and considering surgery for her knee.  They ordered an MRI of her right knee.  Now back on Humira but taking it weekly. Suppose to be on it every 2 weeks  Also on methotrexate 6 pills weekly  Continue to have a lot of pain in R knee  Also having some R sided back pain, no radiculopathy symptoms     6/4/2022:  Presents for follow-up of seropositive RA  Currently on Humira every 2 weeks and methotrexate 6 pills weekly  She had right knee replacement on 5/23  Has R knee pain but otherwise no other joint pain or swelling  Plans on having L knee replaced in the next 2-3 mos   Doing at home physical therapy    10/4/2022:  Presents for follow-up of seropositive RA  Currently on Humira every 2 weeks and methotrexate 6 pills weekly  R knee replaced in May 2022 and doing well  Going to have the Left replaced in November   Has been able to walk better after the knee replacement  No other joint pain but hands and fingers will get stuck at times, feels like they are cramping   Shoulders are good     1/9/2023  Presents for follow-up of seropositive RA  Currently on Humira every 2 weeks and methotrexate 6 pills weekly  Had L knee replacement 11/28 and doing well  Having more joint  pain in hands, elbows. Some swelling in the ankles. Whole body aches.   Has been feeling really cold these days, no fevers or recent infection    2/27/2023:  Presents for follow-up of seropositive RA  Currently on Humira every 2 weeks and methotrexate 6 pills weekly  During last visit was having some pain and swelling in left ankle, medrol dose pack was given and helped but now pain and swelling in left ankle has returned.  Has mild pain in wrists and elbows, no swelling. Some stiffness in hands, can make fist but not tight.  Has some pain in right ankle too  Had b/l knee replacements and has some pain     5/31/2023:  Presents for follow-up of seropositive RA  Currently on Humira every 2 weeks and methotrexate 8 pills weekly  B/L knee's now replaced   Having some knee pain  Also hands feel inflamed and at times fingers get stuck. Able to close hand but not tight  Some pain in left elbow  Some pain in ankles    7/31/2023:  Presents for follow-up of seropositive RA  Currently on Humira every 2 weeks, methotrexate 8 pills weekly and  mg daily  During her last visit she was having some more pain in her hands, left elbow and ankles.  Hydroxychloroquine was added at that time  Continues to have pain in all the joints  Has swelling in the left knee, left knee was replaced   Also muscles hurt too  Pain is no significant, rates it a 6/10  When she goes on prednisone does not help the joints     10/23/2023:  Presents for follow-up of seropositive RA  Currently on methotrexate 8 pills weekly and  mg daily  Now on Enbrel weekly for past 2 mos, was initially on Humira but continued to have joint pain   Having pain everywhere, in the shoulders, elbows, hands and mid back. Also having neck pain. Muscles also hurt  Recent blood work shows normal ESR and CRP    2/23/2024:  Presents for follow-up of seropositive RA  Currently on methotrexate 8 pills weekly and  mg daily  Stopped Enbrel as she had a injection site  reaction about 2 mos ago  Has joint pain in the shoulders, elbows and hands. Hard to make a fist with left hand.  Also has pain in both knees but both of them were replaced  No rashes     6/18/2024:  Presents for follow-up of seropositive RA  Currently on methotrexate 8 pills weekly and  mg daily  She was approved for Cimzia but patient said she called pharmacy and said it was not approved  Reports having pain in lower back region  Has mild pain in the knees, both knees have been replaced  States that the fingers will get stuck in a position and will be hard to move  Mild swelling in the hands  Mild pain in the elbows  Bone density was done showing osteopenia in the hip and osteoporosis in the lumbar spine  No fractures      HISTORY:  Past Medical History:    Adenomatosis, biliary    Diabetes (HCC)    Esophageal reflux    Essential hypertension    High blood pressure    OA (osteoarthritis)    RA (rheumatoid arthritis) (HCC)    Visual impairment    glasses      Social Hx Reviewed   Family Hx Reviewed     Medications (Active prior to today's visit):  Current Outpatient Medications   Medication Sig Dispense Refill    Valsartan-hydroCHLOROthiazide 320-25 MG Oral Tab Take 1 tablet by mouth every morning.      folic acid 1 MG Oral Tab Take 1 tablet (1 mg total) by mouth daily. 90 tablet 1    gabapentin 600 MG Oral Tab Take 1 tablet (600 mg total) by mouth 3 (three) times daily. 90 tablet 2    hydroxychloroquine 200 MG Oral Tab Take 2 tablets (400 mg total) by mouth daily. 180 tablet 1    methotrexate 2.5 MG Oral Tab Take 8 tablets (20 mg total) by mouth once a week. 103 tablet 1    CELECOXIB 200 MG Oral Cap TAKE 1 CAPSULE(200 MG) BY MOUTH DAILY 30 capsule 0    oxyCODONE-acetaminophen (PERCOCET) 5-325 MG Oral Tab Take 1 tablet by mouth every 6 (six) hours as needed for Pain. 20 tablet 0    traMADol 50 MG Oral Tab Take 1 tablet (50 mg total) by mouth every 6 (six) hours as needed for Pain. No alcohol or driving on  this med. Stop if lethargic or hallucinating. 20 tablet 0    amitriptyline 10 MG Oral Tab Take 1 tablet (10 mg total) by mouth nightly.      aspirin 325 MG Oral Tab Take 1 tablet (325 mg total) by mouth 2 (two) times daily. 28 tablet 0    polyethylene glycol, PEG 3350, 17 g Oral Powd Pack Take 17 g by mouth daily as needed. 24 each 0    FEROSUL 325 (65 Fe) MG Oral Tab       metFORMIN HCl 500 MG Oral Tab Take 1 tablet (500 mg total) by mouth daily.  1    Certolizumab Pegol (CIMZIA STARTER KIT) 6 X 200 MG/ML Subcutaneous Prefilled Syringe Kit Inject 400 mg into the skin every 14 (fourteen) days. (Patient not taking: Reported on 6/18/2024) 1 each 0    Certolizumab Pegol (CIMZIA) 2 X 200 MG/ML Subcutaneous Prefilled Syringe Kit Inject 400 mg into the skin every 28 days. (Patient not taking: Reported on 6/18/2024) 1 each 5    losartan Potassium 50 MG Oral Tab Take by mouth daily. (Patient not taking: Reported on 6/18/2024)       .cmed  Allergies:  Allergies   Allergen Reactions    Enalapril Coughing     cough         ROS:   All other ROS are negative.     PHYSICAL EXAM:   GEN: AAOx3, NAD  HEENT: EOMI, PERRLA, no injection or icterus, oral mucosa moist, no oral lesions. No lymphadenopathy. No facial rash  CVS: RRR, no murmurs rubs or gallops. Equal 2+ distal pulses.   LUNGS: CTAB, no increased work of breathing  ABDOMEN:  soft NT/ND, +BS, no HSM  SKIN: No rashes or skin lesions. No nail findings  MSK:  TTP in paraspinal region, trapezius, shoulders, thigh and calves   Cervical spine: FROM  Hands: no synovitis in DIP, PIP and MCP, strong full fists  Wrist: FROM, no pain or swelling or warmth on palpation  Elbow: L elbow mild flexion contracture. R elbow FROM  Shoulders: b/l shoulders FROM   Hip: normal log roll, no lateral hip pain, MAI test negative b/l  Knees: R knee stable  Ankles: no swelling, TTP in b/l ankles   Feet: no pain with MTP squeeze, no toe swelling or pain or warmth on palpation with FROM  Spine: no  lumbar or sacral pain on palpation.  NEURO: Cranial nerves II-XII intact grossly. 5/5 strength throughout in both upper and lower extremities, sensation intact.  PSYCH: normal mood       LABS:     May 2019:  Vitamin D 48, Hgb 9.2  RF 17.9, ESR 26, CRP 6.9 mg/L    Previous rheumatologist  Synovial fluid removed showing cell count of 38,405, nucleated cells 83%. Negative for crystals     Imaging:     None    ASSESSMENT/PLAN:     Seropositive RA (+RF and CCP)  - had b/l TKR but now having pain and stiffness in her hands, fingers and even her ankles.  She was given a Medrol Dosepak during her last visit which helped  - she had a +quant TB in 2017 but treated with rifampin for 4 mos in Jan 2018, will need annual CXR to monitor  - she was on Humira initially was working but stopped working  - had injection site with Enbrel  - Continue methotrexate 8 pills weekly and folic acid daily and  mg daily  - She was approved for Cimzia but patient said she called pharmacy and said it was not approved, will have nurse contact   - Patient will see ophthalmology April 23, 2024  - Blood work today,  blood work every 3 mos     Osteoporosis   - Bone density done 4/2024 showing osteopenia in hip and osteoporosis in the lumbar spine  - Plan to start fosomax weekly.  Risks/benefits of Bisphosphonate d/w patient which include: muscle cramps/pain, pain with swallowing, heartburn, abdominal pain, nausea, headache, and/or rash. Rare manifestations include osteonecrosis of the jaw and abnormal fractures of the femur  - With oral bisphosphonates pts should take the medication first thing in the morning on an empty stomach with a full glass of water. Pt should remain upright for at least 30-60 minutes after medication ingestion  - Will need to obtain vitamin D prior to starting and if <20 will need to treat    Lower back pain   - XR lumbar spine  - referred to PT    Myofascial pain syndrome  - Continue gabapentin 600 mg TID    B/L TKR  -  still having some knee pain, advised to see orthopedic   +Quant TB  - treated in Jan 2018    Spent 40 minutes obtaining history, evaluating patient, reviewing labs, discussing treatment options and completing documentation    Pt will f/u in 3-4 mos     There is a longitudinal care relationship with me, the care plan reflects the ongoing nature of the continuous relationship of care, and the medical record indicates that there is ongoing treatment of a serious/complex medical condition which I am currently managing.  is Applicable.     Blanca Castaneda MD  6/18/2024  8:45 AM

## 2024-06-24 ENCOUNTER — TELEPHONE (OUTPATIENT)
Dept: RHEUMATOLOGY | Facility: CLINIC | Age: 69
End: 2024-06-24

## 2024-06-25 NOTE — TELEPHONE ENCOUNTER
Phoned pt; verified name and . Informed pt of results of blood tests and xray. Directed pt to increase fluids and start PT. Initially pt very alarmed hearing disc disease - concerned about cancer. Provided additional education regarding degenerative disc disease. All questions answered; pt appeared reassured by end of call.

## 2024-06-25 NOTE — TELEPHONE ENCOUNTER
Patient can be notified that I reviewed her blood work.  Her kidney function creatinine is slightly elevated 1.2.  If she can make sure to stay hydrated.  Normal liver test.  Normal inflammation markers  X-ray of the lower spine did show some moderate disc disease  I already referred her to physical therapy, she should start that and see if it helps her back pain

## 2024-06-27 ENCOUNTER — TELEPHONE (OUTPATIENT)
Dept: PHYSICAL THERAPY | Facility: HOSPITAL | Age: 69
End: 2024-06-27

## 2024-08-07 ENCOUNTER — TELEPHONE (OUTPATIENT)
Dept: PHYSICAL THERAPY | Facility: HOSPITAL | Age: 69
End: 2024-08-07

## 2024-08-07 ENCOUNTER — APPOINTMENT (OUTPATIENT)
Dept: PHYSICAL THERAPY | Age: 69
End: 2024-08-07
Attending: INTERNAL MEDICINE
Payer: MEDICAID

## 2024-08-12 ENCOUNTER — OFFICE VISIT (OUTPATIENT)
Dept: PHYSICAL THERAPY | Age: 69
End: 2024-08-12
Attending: INTERNAL MEDICINE
Payer: MEDICAID

## 2024-08-12 PROCEDURE — 97161 PT EVAL LOW COMPLEX 20 MIN: CPT

## 2024-08-12 PROCEDURE — 97110 THERAPEUTIC EXERCISES: CPT

## 2024-08-12 NOTE — PROGRESS NOTES
P.T. EVALUATION:   Referring Physician: Dr. Castaneda  Diagnosis: Chronic bilateral low back pain without sciatica (M54.50,G89.29)   Date of Onset: June 2024 Date of Service: 8/12/2024     PATIENT SUMMARY   Patient verbalized consent for Physical Therapy evaluation and treatment.  Kat Lara is a 68 year old y/o female who presents to therapy today with complaints of low back pain   Pt describes pain level 8/10 at worst   History of current condition:  Patient reports her pain is located in her low back and gluteal region.  Patient reports the pain began approximately two months ago.   She reports she feels like she cannot stand up straight.    Current functional limitations include lifting, stair negotiation, mowing the lawn, bending, sitting, driving, transfers, sleeping, ADLs  Kat describes prior level of function independent; active around her house per patient  Pt goals include pain relief  Past medical history was reviewed with Kat. 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), Esophageal reflux, Essential hypertension, High blood pressure, OA (osteoarthritis), RA (rheumatoid arthritis) ([facility]), and Visual impairment. Other co-morbidities include R/L total knee replacement  Social hx:  Patient is not working, but does work at home.    Language: Burmese; language line utilized;  Name: Aldo; ID# [id number]     ASSESSMENT:   Patient presents to PT clinic due low back pain beginning approximately two months ago.  Patient demos decreased lumbar ROM, slight decreased B knee ROM, altered posture, decreased BLE flexibility, decreased BLE strength, and pain.  These impairments are functionally limiting pt's ability to perform ADLs, lift, negotiate stairs, bend, sit, drive, transfer, sleep, and mow the lawn.  Kat would benefit from skilled Physical Therapy to address the above impairments to relieve pain.      Precautions:  DM   OBJECTIVE:   Observation:   pt ambulates into clinic independently; slight L quad atrophy compared to R noted    Palpation:   (+) TTP lumbar paraspinals near L5 region    Sensation: normal BLE sensation    AROM:   Lumbar ROM:  Flx: Min loss (pain)  Ext: Min-mod loss (pain)  Rot: R min loss (pain), L mod loss (pain)  Lat flx: B min loss (pain)    Knee ROM:   Ext: R -2 deg, L -2 deg  Flx: R 115 deg, L  110 deg    Accessory motion:   PA assessment:  Lumbar spine: hypomobility and pain noted throughout    Flexibility:   HS: unable due to pain with hip flexion   Quads: R mod loss, L mod/max loss    Strength/MMT:   Hip flx: R 4/5 (pain), L 4+/5 (pain)  Hip ext: R 3+/5, L 3+/5  Knee ext: R 5/5, L 5/5  Knee flx: R 4+/5, L 4/5  Ankle df: B 5/5    Posture: slight forward flexed trunk and guarded posture    Gait: pt ambulates into clinic independently    Functional Outcome Measure: none completed at intake    Today’s Treatment and Response:  Patient education provided on PT eval findings, treatment plan, goals, HEP  Patient received there ex, HEP, heat pad to low back in prone x 10 minutes  Charges: PT Eval, TE 1      Total Timed Treatment: 40 min     Total Treatment Time: 50 min      PT Eval Low complexity       PLAN OF CARE:    Goals:    1- Pt will be I with maintenance and progression of HEP  2- Pt will demo increase in lumbar ROM to WNL to ease ability for pt to perform ADLs  3- Pt will demo increase in BLE strength to 4+/5 or better to ease ability for pt to lift  4- Pt will report abolishment of pain with transfers  5- Pt will demo Ind with proper body mechanics for lifting 10# box to ensure safe ability to lift    Frequency / Duration: Patient will be seen for 2x/week or a total of 8 visits over a 90 day period. Treatment will include: Modalities prn, manual therapy, therapeutic exercises, therapeutic activity, and instructions on a home program.     Education or treatment limitation: Communication  Rehab Potential:good    Patient was advised of  these findings, precautions, and treatment options and has agreed to actively participate in planning and for this course of care.    Thank you for your referral. Please co-sign or sign and return this letter via fax as soon as possible to [phone]. If you have any questions, please contact me at Dept: [phone]    Sincerely,  Electronically signed by therapist: Cyndi Bolden PT, DPT    Physician's certification required: Yes  I certify the need for these services furnished under this plan of treatment and while under my care.    X___________________________________________________ Date____________________    Certification From: 8/12/2024  To:11/10/2024

## 2024-08-14 ENCOUNTER — APPOINTMENT (OUTPATIENT)
Dept: PHYSICAL THERAPY | Age: 69
End: 2024-08-14
Attending: INTERNAL MEDICINE
Payer: MEDICAID

## 2024-08-19 ENCOUNTER — APPOINTMENT (OUTPATIENT)
Dept: PHYSICAL THERAPY | Age: 69
End: 2024-08-19
Attending: INTERNAL MEDICINE
Payer: MEDICAID

## 2024-08-28 ENCOUNTER — OFFICE VISIT (OUTPATIENT)
Dept: PHYSICAL THERAPY | Age: 69
End: 2024-08-28
Attending: INTERNAL MEDICINE
Payer: MEDICAID

## 2024-08-28 PROCEDURE — 97110 THERAPEUTIC EXERCISES: CPT

## 2024-08-28 PROCEDURE — 97014 ELECTRIC STIMULATION THERAPY: CPT

## 2024-08-28 NOTE — PROGRESS NOTES
Diagnosis: Chronic bilateral low back pain without sciatica (M54.50,G89.29)         Next MD visit: none scheduled  Fall Risk: standard         Precautions: n/a          Medication Changes since last visit?: No    Subjective:  Patient reports 8/10 low back and right leg/thigh pain today.  She reports she is feeling a little better.        Objective:     Date: 8/28/2024  Visit #: 2/8 (exp. 10/11/2024)   HEP   - updated HEP - see pt instructions section   Therapeutic Exercise   X 23 minutes  - supine R/L LTR 10x ea  - supine R/L sciatic nerve glide 10x ea  - supine R/L SKTC 10x ea  - prone R/L knee flexion 10x ea  - prone R/L hip extension 10x ea  - prone lying > CRISTHIAN 10x  - seated slouch overcorrect 10x   Manual Therapy   X 5 minutes  - lumbar spine mobilizations grade 2 - 3 in prone    Therapeutic Activity   -   Modalities   X 15 minutes  - estim/IFC with heat to low back in prone              Assessment:  Session focused on gentle lumbar stretching.  Initiated manual techniques and estim/IFC for pain relief.      Plan:  continue PT    Charges:  Ex 2 Estim 1     Total Timed Treatment: 28 min  Total Treatment Time: 43 min

## 2024-09-04 ENCOUNTER — APPOINTMENT (OUTPATIENT)
Dept: PHYSICAL THERAPY | Age: 69
End: 2024-09-04
Attending: INTERNAL MEDICINE
Payer: MEDICAID

## 2024-09-11 ENCOUNTER — OFFICE VISIT (OUTPATIENT)
Dept: PHYSICAL THERAPY | Age: 69
End: 2024-09-11
Attending: INTERNAL MEDICINE
Payer: MEDICAID

## 2024-09-11 PROCEDURE — 97110 THERAPEUTIC EXERCISES: CPT

## 2024-09-11 PROCEDURE — 97140 MANUAL THERAPY 1/> REGIONS: CPT

## 2024-09-11 PROCEDURE — 97014 ELECTRIC STIMULATION THERAPY: CPT

## 2024-09-11 NOTE — PROGRESS NOTES
Diagnosis: Chronic bilateral low back pain without sciatica (M54.50,G89.29)         Next MD visit: none scheduled  Fall Risk: standard         Precautions: n/a          Medication Changes since last visit?: No    Subjective:  Patient reports 7/10 low back pain and B hip pain today.  She reports no knee pain.       Objective:     Date: 9/11/2024  Visit #: 3/8 (exp. 10/11/2024) Date: 8/28/2024  Visit #: 2/8 (exp. 10/11/2024)   HEP    - updated HEP - see pt instructions section   Therapeutic Exercise   X 20 minutes  - supine R/L LTR 10x ea  - supine R/L sciatic nerve glide 10x ea  - supine R/L SKTC 10x ea  - supine R/L piriformis stretch with clinician assist with 3x 15   - prone R/L knee flexion 15x ea  - prone R/L hip extension 15x ea  - PPU 10x  - supine R/L sciatic nerve glide 10x ea X 23 minutes  - supine R/L LTR 10x ea  - supine R/L sciatic nerve glide 10x ea  - supine R/L SKTC 10x ea  - prone R/L knee flexion 10x ea  - prone R/L hip extension 10x ea  - prone lying > CRISTHIAN 10x  - seated slouch overcorrect 10x   Manual Therapy   X 8 minutes  - lumbar spine mobilizations grade 2 - 3 in prone  X 5 minutes  - lumbar spine mobilizations grade 2 - 3 in prone    Therapeutic Activity    -   Modalities   X 15 minutes  - estim/IFC with heat to low back in prone  X 15 minutes  - estim/IFC with heat to low back in prone               Assessment:  Patient reporting relief with spinal mobilizations.  Continued with current interventions and initiated piriformis stretching.       Plan:  continue PT    Charges:  Ex 1 Man 1 Estim 1     Total Timed Treatment: 28 min  Total Treatment Time: 43 min

## 2024-09-18 ENCOUNTER — OFFICE VISIT (OUTPATIENT)
Dept: PHYSICAL THERAPY | Age: 69
End: 2024-09-18
Attending: INTERNAL MEDICINE
Payer: MEDICAID

## 2024-09-18 PROCEDURE — 97110 THERAPEUTIC EXERCISES: CPT

## 2024-09-18 PROCEDURE — 97140 MANUAL THERAPY 1/> REGIONS: CPT

## 2024-09-18 NOTE — PROGRESS NOTES
Diagnosis: Chronic bilateral low back pain without sciatica (M54.50,G89.29)         Next MD visit: none scheduled  Fall Risk: standard         Precautions: n/a          Medication Changes since last visit?: No    Subjective:  Patient reports 5/10 low back pain today.        Objective:     Date: 9/18/2024  Visit #: 4/8 (exp. 10/11/2024) Date: 9/11/2024  Visit #: 3/8 (exp. 10/11/2024) Date: 8/28/2024  Visit #: 2/8 (exp. 10/11/2024)   HEP     - updated HEP - see pt instructions section   Therapeutic Exercise   X 30 minutes  - supine R/L LTR 10x ea  - supine R/L SKTC 10x ea  - supine R/L piriformis stretch with clinician assist with 3x 15 sec  - prone R/L hip extension 20x ea  - PPU 1 x 10  - prone R/L knee flexion 20x ea  - prone alternating UE/LE lifts 10x ea  - seated R/L sciatic nerve glide 10x ea  - standing lumbar extension 10x   X 20 minutes  - supine R/L LTR 10x ea  - supine R/L sciatic nerve glide 10x ea  - supine R/L SKTC 10x ea  - supine R/L piriformis stretch with clinician assist with 3x 15 sec  - prone R/L knee flexion 15x ea  - prone R/L hip extension 15x ea  - PPU 10x   X 23 minutes  - supine R/L LTR 10x ea  - supine R/L sciatic nerve glide 10x ea  - supine R/L SKTC 10x ea  - prone R/L knee flexion 10x ea  - prone R/L hip extension 10x ea  - prone lying > CRISTHIAN 10x  - seated slouch overcorrect 10x   Manual Therapy   X 8 minutes  - lumbar spine mobilizations grade 2 - 3 in prone  X 8 minutes  - lumbar spine mobilizations grade 2 - 3 in prone  X 5 minutes  - lumbar spine mobilizations grade 2 - 3 in prone    Therapeutic Activity     -   Modalities    X 15 minutes  - estim/IFC with heat to low back in prone  X 15 minutes  - estim/IFC with heat to low back in prone                Assessment:  Initiated prone core exercise this session and continued with lumbar extension based exercises.      Plan:  continue PT    Charges:  Ex 2 Man 1   Total Timed Treatment: 38 min  Total Treatment Time: 38 min

## 2024-09-30 ENCOUNTER — OFFICE VISIT (OUTPATIENT)
Dept: PHYSICAL THERAPY | Age: 69
End: 2024-09-30
Attending: INTERNAL MEDICINE
Payer: MEDICAID

## 2024-09-30 PROCEDURE — 97110 THERAPEUTIC EXERCISES: CPT

## 2024-09-30 PROCEDURE — 97140 MANUAL THERAPY 1/> REGIONS: CPT

## 2024-10-07 ENCOUNTER — OFFICE VISIT (OUTPATIENT)
Dept: PHYSICAL THERAPY | Age: 69
End: 2024-10-07
Attending: INTERNAL MEDICINE
Payer: MEDICAID

## 2024-10-07 PROCEDURE — 97014 ELECTRIC STIMULATION THERAPY: CPT

## 2024-10-07 PROCEDURE — 97110 THERAPEUTIC EXERCISES: CPT

## 2024-10-07 PROCEDURE — 97140 MANUAL THERAPY 1/> REGIONS: CPT

## 2024-10-07 NOTE — PROGRESS NOTES
Diagnosis: Chronic bilateral low back pain without sciatica (M54.50,G89.29)         Next MD visit: none scheduled  Fall Risk: standard         Precautions: n/a          Medication Changes since last visit?: No    Subjective:  Patient reports 4/10 gluteal region pain this morning.  She shows clinician she is wearing a corset that seems to help.       Objective:     Date: 10/7/2024  Visit #: 6/8 (exp. 10/11/2024) Date: 9/30/2024  Visit #: 5/8 (exp. 10/11/2024) Date: 9/18/2024  Visit #: 4/8 (exp. 10/11/2024)   HEP   - updated HEP - see pt instructions section; PPU 4x/day - updated HEP - see pt instructions section    Therapeutic Exercise   X 23 minutes  - supine R/L piriformis stretch with clinician assist with 3x 15 sec  - PPU 1 x 10, 1 x 10  - PPU with belt 1 x 10, 1 x 5  - PPU with clinician OP 1 x 5 (centralizes pain)  - prone alternating UE/LE lifts 10x ea  - DKTC 5x 10 sec holds (increases pain)  - hooklying hip adduction 10x 5 sec holds X 26 minutes  - supine R/L LTR 10x ea  - supine R/L SKTC 10x ea  - supine R/L piriformis stretch with clinician assist with 3x 15 sec  - PPU 2 x 10  - prone alternating UE/LE lifts 10x ea  - supine R/L sciatic nerve glide 5x ea  - ramo pose 5x X 30 minutes  - supine R/L LTR 10x ea  - supine R/L SKTC 10x ea  - supine R/L piriformis stretch with clinician assist with 3x 15 sec  - prone R/L hip extension 20x ea  - PPU 1 x 10  - prone R/L knee flexion 20x ea  - prone alternating UE/LE lifts 10x ea  - seated R/L sciatic nerve glide 10x ea  - standing lumbar extension 10x     Manual Therapy   X 8 minutes  - lumbar spine mobilizations grade 2 - 3 in prone   - foam roll to R piriformis gluteal hamstring musculature X 8 minutes  - lumbar spine mobilizations grade 2 - 3 in prone   - foam roll to R piriformis gluteal hamstring musculature X 8 minutes  - lumbar spine mobilizations grade 2 - 3 in prone    Therapeutic Activity        Modalities   X 15 minutes  - IFC/estim to low back in  prone with head pad                 Assessment:  Pain centralized with progression of force into lumbar extension via PPU with clinician OP and belt OP.  Discussed with pt to perform PPU 4x/day.        Plan:  continue PT    Charges:  Ex 2 Man 1   Total Timed Treatment: 46 min  Total Treatment Time: 46 min

## 2024-10-14 ENCOUNTER — OFFICE VISIT (OUTPATIENT)
Dept: PHYSICAL THERAPY | Age: 69
End: 2024-10-14
Attending: INTERNAL MEDICINE
Payer: MEDICAID

## 2024-10-14 PROCEDURE — 97110 THERAPEUTIC EXERCISES: CPT

## 2024-10-14 NOTE — PROGRESS NOTES
Diagnosis: Chronic bilateral low back pain without sciatica (M54.50,G89.29)         Next MD visit: none scheduled  Fall Risk: standard         Precautions: n/a          Medication Changes since last visit?: No    Subjective:  Patient reports 4/10 low back and R gluteal region pain this morning.  She reports compliance with her home exercise program.        Objective:    10/14/2024:  Pt arrived 15 minutes late to appointment     Date: 10/14/2024  Visit #: 7/8 (exp. 10/11/2024) Date: 10/7/2024  Visit #: 6/8 (exp. 10/11/2024) Date: 9/30/2024  Visit #: 5/8 (exp. 10/11/2024)   HEP   - updated HEP - see pt instructions section; GTB for home - updated HEP - see pt instructions section; PPU 4x/day - updated HEP - see pt instructions section   Therapeutic Exercise   X 30 minutes  - supine R/L piriformis stretch with towel assist 3 x 15 sec  - PPU 1 x 10  - PPU with belt 1 x 10, 1 x 10  - prone alternating UE/LE lifts 10x ea  - sidelying R hip abduction 2 x 10  - sidelying R clams with GTB above knees 2 x 10 X 23 minutes  - supine R/L piriformis stretch with clinician assist 3 x 15 sec  - PPU 1 x 10, 1 x 10  - PPU with belt 1 x 10, 1 x 5  - PPU with clinician OP 1 x 5 (centralizes pain)  - prone alternating UE/LE lifts 10x ea  - DKTC 5x 10 sec holds (increases pain)  - hooklying hip adduction 10x 5 sec holds X 26 minutes  - supine R/L LTR 10x ea  - supine R/L SKTC 10x ea  - supine R/L piriformis stretch with clinician assist with 3x 15 sec  - PPU 2 x 10  - prone alternating UE/LE lifts 10x ea  - supine R/L sciatic nerve glide 5x ea  - ramo pose 5x   Manual Therapy     X 8 minutes  - lumbar spine mobilizations grade 2 - 3 in prone   - foam roll to R piriformis gluteal hamstring musculature   Therapeutic Activity        Modalities    X 15 minutes  - IFC/estim to low back in prone with head pad                Assessment:  Initiated additional RLE gluteal strengthening interventions this session and continued with PPU with OP.         Plan:  pt to follow up with MD and continue HEP    Charges:  Ex 2   Total Timed Treatment: 30 min  Total Treatment Time: 30 min

## 2024-10-16 ENCOUNTER — OFFICE VISIT (OUTPATIENT)
Dept: RHEUMATOLOGY | Facility: CLINIC | Age: 69
End: 2024-10-16

## 2024-10-16 ENCOUNTER — LAB ENCOUNTER (OUTPATIENT)
Dept: LAB | Facility: HOSPITAL | Age: 69
End: 2024-10-16
Attending: INTERNAL MEDICINE
Payer: MEDICAID

## 2024-10-16 VITALS
DIASTOLIC BLOOD PRESSURE: 79 MMHG | BODY MASS INDEX: 27.29 KG/M2 | WEIGHT: 154 LBS | SYSTOLIC BLOOD PRESSURE: 167 MMHG | HEIGHT: 63 IN | HEART RATE: 62 BPM

## 2024-10-16 DIAGNOSIS — M05.9 SEROPOSITIVE RHEUMATOID ARTHRITIS (HCC): Primary | ICD-10-CM

## 2024-10-16 DIAGNOSIS — Z51.81 THERAPEUTIC DRUG MONITORING: ICD-10-CM

## 2024-10-16 DIAGNOSIS — M54.50 CHRONIC BILATERAL LOW BACK PAIN WITHOUT SCIATICA: ICD-10-CM

## 2024-10-16 DIAGNOSIS — M05.9 SEROPOSITIVE RHEUMATOID ARTHRITIS (HCC): ICD-10-CM

## 2024-10-16 DIAGNOSIS — G89.29 CHRONIC BILATERAL LOW BACK PAIN WITHOUT SCIATICA: ICD-10-CM

## 2024-10-16 DIAGNOSIS — M25.562 CHRONIC PAIN OF BOTH KNEES: ICD-10-CM

## 2024-10-16 DIAGNOSIS — G89.29 CHRONIC PAIN OF BOTH KNEES: ICD-10-CM

## 2024-10-16 DIAGNOSIS — M25.561 CHRONIC PAIN OF BOTH KNEES: ICD-10-CM

## 2024-10-16 DIAGNOSIS — R76.12 POSITIVE QUANTIFERON-TB GOLD TEST: ICD-10-CM

## 2024-10-16 LAB
ALBUMIN SERPL-MCNC: 4.4 G/DL (ref 3.2–4.8)
ALT SERPL-CCNC: 17 U/L
AST SERPL-CCNC: 25 U/L (ref ?–34)
BASOPHILS # BLD AUTO: 0.02 X10(3) UL (ref 0–0.2)
BASOPHILS NFR BLD AUTO: 0.4 %
CREAT BLD-MCNC: 1 MG/DL
CRP SERPL-MCNC: <0.4 MG/DL (ref ?–1)
DEPRECATED RDW RBC AUTO: 49.3 FL (ref 35.1–46.3)
EGFRCR SERPLBLD CKD-EPI 2021: 62 ML/MIN/1.73M2 (ref 60–?)
EOSINOPHIL # BLD AUTO: 0.09 X10(3) UL (ref 0–0.7)
EOSINOPHIL NFR BLD AUTO: 1.8 %
ERYTHROCYTE [DISTWIDTH] IN BLOOD BY AUTOMATED COUNT: 13.1 % (ref 11–15)
ERYTHROCYTE [SEDIMENTATION RATE] IN BLOOD: 9 MM/HR
HCT VFR BLD AUTO: 34.6 %
HGB BLD-MCNC: 11.1 G/DL
IMM GRANULOCYTES # BLD AUTO: 0.01 X10(3) UL (ref 0–1)
IMM GRANULOCYTES NFR BLD: 0.2 %
LYMPHOCYTES # BLD AUTO: 2.58 X10(3) UL (ref 1–4)
LYMPHOCYTES NFR BLD AUTO: 51.9 %
MCH RBC QN AUTO: 33 PG (ref 26–34)
MCHC RBC AUTO-ENTMCNC: 32.1 G/DL (ref 31–37)
MCV RBC AUTO: 103 FL
MONOCYTES # BLD AUTO: 0.6 X10(3) UL (ref 0.1–1)
MONOCYTES NFR BLD AUTO: 12.1 %
NEUTROPHILS # BLD AUTO: 1.67 X10 (3) UL (ref 1.5–7.7)
NEUTROPHILS # BLD AUTO: 1.67 X10(3) UL (ref 1.5–7.7)
NEUTROPHILS NFR BLD AUTO: 33.6 %
PLATELET # BLD AUTO: 184 10(3)UL (ref 150–450)
RBC # BLD AUTO: 3.36 X10(6)UL
WBC # BLD AUTO: 5 X10(3) UL (ref 4–11)

## 2024-10-16 PROCEDURE — 84460 ALANINE AMINO (ALT) (SGPT): CPT

## 2024-10-16 PROCEDURE — 99214 OFFICE O/P EST MOD 30 MIN: CPT | Performed by: INTERNAL MEDICINE

## 2024-10-16 PROCEDURE — 84450 TRANSFERASE (AST) (SGOT): CPT

## 2024-10-16 PROCEDURE — 36415 COLL VENOUS BLD VENIPUNCTURE: CPT

## 2024-10-16 PROCEDURE — 82040 ASSAY OF SERUM ALBUMIN: CPT

## 2024-10-16 PROCEDURE — 85025 COMPLETE CBC W/AUTO DIFF WBC: CPT

## 2024-10-16 PROCEDURE — 82565 ASSAY OF CREATININE: CPT

## 2024-10-16 PROCEDURE — 86140 C-REACTIVE PROTEIN: CPT

## 2024-10-16 PROCEDURE — 85652 RBC SED RATE AUTOMATED: CPT

## 2024-10-16 RX ORDER — FOLIC ACID 1 MG/1
1 TABLET ORAL DAILY
Qty: 90 TABLET | Refills: 1 | Status: SHIPPED | OUTPATIENT
Start: 2024-10-16

## 2024-10-16 RX ORDER — GABAPENTIN 600 MG/1
600 TABLET ORAL 3 TIMES DAILY
Qty: 90 TABLET | Refills: 2 | Status: SHIPPED | OUTPATIENT
Start: 2024-10-16

## 2024-10-16 RX ORDER — METHOTREXATE 2.5 MG/1
20 TABLET ORAL WEEKLY
Qty: 103 TABLET | Refills: 1 | Status: SHIPPED | OUTPATIENT
Start: 2024-10-16

## 2024-10-16 RX ORDER — HYDROXYCHLOROQUINE SULFATE 200 MG/1
400 TABLET, FILM COATED ORAL DAILY
Qty: 180 TABLET | Refills: 1 | Status: SHIPPED | OUTPATIENT
Start: 2024-10-16

## 2024-10-16 NOTE — PATIENT INSTRUCTIONS
You were seen for RA  Joints are better  Continue cimzia injection monthly   Also continue methotrexate 8 pills weekly  Hydroxychloroquine 4 mg daily  Folic acid daily  Blood work today  I gave you referral for the back doctors  Follow-up in 4 months

## 2024-10-16 NOTE — PROGRESS NOTES
Kat Lara is a 68 year old female.    HPI:     Chief Complaint   Patient presents with    Rheumatoid Arthritis    Back Pain     I had the pleasure of seeing Kat Lara on 10/16/2024 for follow up Seropositve RA (+RF and CCP).    Current Medications:  Cimzia 400 mg every month- started 6/2024  MTX 8 pills weekly and FA daily   mg daily- started 5/2023  Celebrex 200 mg daily  Gabapentin 600 mg TID  Elavil 10 mg daily   Fosomax weekly- started 6/2024  Previous medications:  Humira evrey 2 weeks- started Aug 2020- 8/2023  Enbrel weekly- started 8/2023- 12/2023 stopped due to injection site reaction  Blood work:  Vitamin D 48, Hgb 9.2  ABIMBOLA 1:1280  RF 17.9,  ESR 26, CRP 6.9 mg/L  +Quant TB- treated with Rifampin in Jan 2018  Synovial fluid cell count of 38,405, no crystals  Bone density 4/2024  LFN T-score    -2.2  LTH                 -1.7  LS                    -3.3    Interval History:  This is a 65 yo Yakut 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   She was then seen by rheumatologist Dr. Falguni Owusu and diagnosed with RA.  She is found to have elevated RF and inflammatory markers.  Started on methotrexate 6 pills weekly and meloxicam 15 mg daily in the summer 2018  She states that the medications are not working.  She also had her right shoulder and knee injected with steroids.  She continues to complain of right shoulder, knee and ankle pain.  She has to use a walker right now due to the pain.  She also has intermittent swelling in her knee and ankle.    8/13/2021:  Presents for follow-up of seropositive RA  Currently on Humira every 2 weeks and methotrexate  Joints are doing very well.  Reports some pain in her left elbow but no swelling  Continues to have bilateral knee pain.  She has had cortisone injections and gel injections with minimal improvement.  During her last visit she was given referral to  orthopedic but has not seen them  Denies any other joint pain or swelling.  Recent blood work reviewed, inflammatory markers are normal    11/19/2021:  Presents for follow-up of seropositive RA  Currently on methotrexate 6 pills weekly  Has not been taking Humira for the past month  Since stopped taking Humira knee pain is better  XR L knee shows progressive severe OA of the medial compartment  XR R knee shows moderate narrowing of medial and lateral cmpts  Still has not seen orthopedic, have given her 2 referrals already  Has some pain and swelling in ankles otherwise no pain in hands, elbows or shoulders  Reports some lower back pain mostly on the R side no radiculopathy symptoms     2/18/2022:  Presents for follow-up of seropositive RA  Currently on methotrexate 6 pills weekly  She was seen by orthopedic recently and considering surgery for her knee.  They ordered an MRI of her right knee.  Now back on Humira but taking it weekly. Suppose to be on it every 2 weeks  Also on methotrexate 6 pills weekly  Continue to have a lot of pain in R knee  Also having some R sided back pain, no radiculopathy symptoms     6/4/2022:  Presents for follow-up of seropositive RA  Currently on Humira every 2 weeks and methotrexate 6 pills weekly  She had right knee replacement on 5/23  Has R knee pain but otherwise no other joint pain or swelling  Plans on having L knee replaced in the next 2-3 mos   Doing at home physical therapy    10/4/2022:  Presents for follow-up of seropositive RA  Currently on Humira every 2 weeks and methotrexate 6 pills weekly  R knee replaced in May 2022 and doing well  Going to have the Left replaced in November   Has been able to walk better after the knee replacement  No other joint pain but hands and fingers will get stuck at times, feels like they are cramping   Shoulders are good     1/9/2023  Presents for follow-up of seropositive RA  Currently on Humira every 2 weeks and methotrexate 6 pills  weekly  Had L knee replacement 11/28 and doing well  Having more joint pain in hands, elbows. Some swelling in the ankles. Whole body aches.   Has been feeling really cold these days, no fevers or recent infection    2/27/2023:  Presents for follow-up of seropositive RA  Currently on Humira every 2 weeks and methotrexate 6 pills weekly  During last visit was having some pain and swelling in left ankle, medrol dose pack was given and helped but now pain and swelling in left ankle has returned.  Has mild pain in wrists and elbows, no swelling. Some stiffness in hands, can make fist but not tight.  Has some pain in right ankle too  Had b/l knee replacements and has some pain     5/31/2023:  Presents for follow-up of seropositive RA  Currently on Humira every 2 weeks and methotrexate 8 pills weekly  B/L knee's now replaced   Having some knee pain  Also hands feel inflamed and at times fingers get stuck. Able to close hand but not tight  Some pain in left elbow  Some pain in ankles    7/31/2023:  Presents for follow-up of seropositive RA  Currently on Humira every 2 weeks, methotrexate 8 pills weekly and  mg daily  During her last visit she was having some more pain in her hands, left elbow and ankles.  Hydroxychloroquine was added at that time  Continues to have pain in all the joints  Has swelling in the left knee, left knee was replaced   Also muscles hurt too  Pain is no significant, rates it a 6/10  When she goes on prednisone does not help the joints     10/23/2023:  Presents for follow-up of seropositive RA  Currently on methotrexate 8 pills weekly and  mg daily  Now on Enbrel weekly for past 2 mos, was initially on Humira but continued to have joint pain   Having pain everywhere, in the shoulders, elbows, hands and mid back. Also having neck pain. Muscles also hurt  Recent blood work shows normal ESR and CRP    2/23/2024:  Presents for follow-up of seropositive RA  Currently on methotrexate 8 pills  weekly and  mg daily  Stopped Enbrel as she had a injection site reaction about 2 mos ago  Has joint pain in the shoulders, elbows and hands. Hard to make a fist with left hand.  Also has pain in both knees but both of them were replaced  No rashes     6/18/2024:  Presents for follow-up of seropositive RA  Currently on methotrexate 8 pills weekly and  mg daily  She was approved for Cimzia but patient said she called pharmacy and said it was not approved  Reports having pain in lower back region  Has mild pain in the knees, both knees have been replaced  States that the fingers will get stuck in a position and will be hard to move  Mild swelling in the hands  Mild pain in the elbows  Bone density was done showing osteopenia in the hip and osteoporosis in the lumbar spine  No fractures    10/16/2024:  Presents for follow-up of seropositive RA  Currently on methotrexate 8 pills weekly and  mg daily  Also on cimzia monthly since June 2024  Joint pain and swelling has improved, less pain and stiffness  Having pain in lower back, xray showed moderate DJD  She did PT with minimal improvement          HISTORY:  Past Medical History:    Adenomatosis, biliary    Diabetes (HCC)    Esophageal reflux    Essential hypertension    High blood pressure    OA (osteoarthritis)    RA (rheumatoid arthritis) (HCC)    Visual impairment    glasses      Social Hx Reviewed   Family Hx Reviewed     Medications (Active prior to today's visit):  Current Outpatient Medications   Medication Sig Dispense Refill    Valsartan-hydroCHLOROthiazide 320-25 MG Oral Tab Take 1 tablet by mouth every morning.      alendronate 70 MG Oral Tab Take 1 tablet (70 mg total) by mouth once a week. Take once weekly in AM, at least 30 minutes before the first food, beverage, or medication of the day. 12 tablet 3    methotrexate 2.5 MG Oral Tab Take 8 tablets (20 mg total) by mouth once a week. 103 tablet 1    hydroxychloroquine 200 MG Oral Tab  Take 2 tablets (400 mg total) by mouth daily. 180 tablet 1    gabapentin 600 MG Oral Tab Take 1 tablet (600 mg total) by mouth 3 (three) times daily. 90 tablet 2    folic acid 1 MG Oral Tab Take 1 tablet (1 mg total) by mouth daily. 90 tablet 1    Certolizumab Pegol (CIMZIA STARTER KIT) 6 X 200 MG/ML Subcutaneous Prefilled Syringe Kit Inject 400 mg into the skin every 14 (fourteen) days. (Patient not taking: Reported on 10/16/2024) 1 each 0    Certolizumab Pegol (CIMZIA) 2 X 200 MG/ML Subcutaneous Prefilled Syringe Kit Inject 400 mg into the skin every 28 days. 1 each 5    CELECOXIB 200 MG Oral Cap TAKE 1 CAPSULE(200 MG) BY MOUTH DAILY 30 capsule 0    oxyCODONE-acetaminophen (PERCOCET) 5-325 MG Oral Tab Take 1 tablet by mouth every 6 (six) hours as needed for Pain. 20 tablet 0    traMADol 50 MG Oral Tab Take 1 tablet (50 mg total) by mouth every 6 (six) hours as needed for Pain. No alcohol or driving on this med. Stop if lethargic or hallucinating. 20 tablet 0    amitriptyline 10 MG Oral Tab Take 1 tablet (10 mg total) by mouth nightly.      aspirin 325 MG Oral Tab Take 1 tablet (325 mg total) by mouth 2 (two) times daily. 28 tablet 0    polyethylene glycol, PEG 3350, 17 g Oral Powd Pack Take 17 g by mouth daily as needed. 24 each 0    losartan Potassium 50 MG Oral Tab Take by mouth daily. (Patient not taking: Reported on 10/16/2024)      FEROSUL 325 (65 Fe) MG Oral Tab       metFORMIN HCl 500 MG Oral Tab Take 1 tablet (500 mg total) by mouth daily.  1     .cmed  Allergies:  Allergies   Allergen Reactions    Enalapril Coughing     cough         ROS:   All other ROS are negative.     PHYSICAL EXAM:   GEN: AAOx3, NAD  HEENT: EOMI, PERRLA, no injection or icterus, oral mucosa moist, no oral lesions. No lymphadenopathy. No facial rash  CVS: RRR, no murmurs rubs or gallops. Equal 2+ distal pulses.   LUNGS: CTAB, no increased work of breathing  ABDOMEN:  soft NT/ND, +BS, no HSM  SKIN: No rashes or skin lesions. No nail  findings  MSK:  TTP in paraspinal region, trapezius, shoulders, thigh and calves   Cervical spine: FROM  Hands: no synovitis in DIP, PIP and MCP, strong full fists  Wrist: FROM, no pain or swelling or warmth on palpation  Elbow: L elbow mild flexion contracture. R elbow FROM  Shoulders: b/l shoulders FROM   Hip: normal log roll, no lateral hip pain, MAI test negative b/l  Knees: R knee stable  Ankles: no swelling, TTP in b/l ankles   Feet: no pain with MTP squeeze, no toe swelling or pain or warmth on palpation with FROM  Spine: no lumbar or sacral pain on palpation.  NEURO: Cranial nerves II-XII intact grossly. 5/5 strength throughout in both upper and lower extremities, sensation intact.  PSYCH: normal mood       LABS:     May 2019:  Vitamin D 48, Hgb 9.2  RF 17.9, ESR 26, CRP 6.9 mg/L    Previous rheumatologist  Synovial fluid removed showing cell count of 38,405, nucleated cells 83%. Negative for crystals     Imaging:     None    ASSESSMENT/PLAN:     Seropositive RA (+RF and CCP)- improved   - she had a +quant TB in 2017 but treated with rifampin for 4 mos in Jan 2018, will need annual CXR to monitor  - she was on Humira and Enbrel but became ineffective  - now on Cimizia monthly since June 2024 and joints doing better  - Continue methotrexate 8 pills weekly and folic acid daily and  mg daily  - Patient will see ophthalmology April 23, 2024  - Blood work today,  blood work every 3 mos     Osteoporosis   - Bone density done 4/2024 showing osteopenia in hip and osteoporosis in the lumbar spine  - She will continue fosomax weekly, started 6/2024  - she will also take calcium and vitamin D    Lower back pain   - XR showing moderated disc disease and narrowing in L5-S1  - she did PT but did not help  - will refer to physiatrist     Myofascial pain syndrome  - Continue gabapentin 600 mg TID    B/L TKR  - still having some knee pain, advised to see orthopedic   +Quant TB  - treated in Jan 2018    Pt will f/u  in 3-4 mos     There is a longitudinal care relationship with me, the care plan reflects the ongoing nature of the continuous relationship of care, and the medical record indicates that there is ongoing treatment of a serious/complex medical condition which I am currently managing.  is Applicable.     Blanca Castaneda MD  10/16/2024  10:37 AM

## 2024-12-03 ENCOUNTER — OFFICE VISIT (OUTPATIENT)
Dept: PHYSICAL MEDICINE AND REHAB | Facility: CLINIC | Age: 69
End: 2024-12-03
Payer: MEDICAID

## 2024-12-03 VITALS — BODY MASS INDEX: 27 KG/M2 | WEIGHT: 154 LBS

## 2024-12-03 DIAGNOSIS — M79.10 MYALGIA: ICD-10-CM

## 2024-12-03 DIAGNOSIS — M81.0 AGE-RELATED OSTEOPOROSIS WITHOUT CURRENT PATHOLOGICAL FRACTURE: ICD-10-CM

## 2024-12-03 DIAGNOSIS — E11.9 TYPE 2 DIABETES MELLITUS WITHOUT RETINOPATHY (HCC): ICD-10-CM

## 2024-12-03 DIAGNOSIS — M05.7A RHEUMATOID ARTHRITIS OF OTHER SITE WITH POSITIVE RHEUMATOID FACTOR (HCC): Chronic | ICD-10-CM

## 2024-12-03 DIAGNOSIS — I25.10 CORONARY ARTERY CALCIFICATION OF NATIVE ARTERY: ICD-10-CM

## 2024-12-03 DIAGNOSIS — M54.16 LUMBAR RADICULOPATHY: ICD-10-CM

## 2024-12-03 DIAGNOSIS — M47.816 FACET SYNDROME, LUMBAR: ICD-10-CM

## 2024-12-03 DIAGNOSIS — Z96.653 HISTORY OF BILATERAL KNEE REPLACEMENT: ICD-10-CM

## 2024-12-03 DIAGNOSIS — M51.369 BULGE OF LUMBAR DISC WITHOUT MYELOPATHY: ICD-10-CM

## 2024-12-03 DIAGNOSIS — I25.84 CORONARY ARTERY CALCIFICATION OF NATIVE ARTERY: ICD-10-CM

## 2024-12-03 DIAGNOSIS — M51.372 DEGENERATION OF INTERVERTEBRAL DISC OF LUMBOSACRAL REGION WITH DISCOGENIC BACK PAIN AND LOWER EXTREMITY PAIN: ICD-10-CM

## 2024-12-03 DIAGNOSIS — M54.59 MECHANICAL LOW BACK PAIN: Primary | ICD-10-CM

## 2024-12-03 DIAGNOSIS — M99.9 BIOMECHANICAL LESION: ICD-10-CM

## 2024-12-03 DIAGNOSIS — M47.816 LUMBAR SPONDYLOSIS: ICD-10-CM

## 2024-12-03 DIAGNOSIS — M48.061 LUMBAR FORAMINAL STENOSIS: ICD-10-CM

## 2024-12-03 PROCEDURE — 99204 OFFICE O/P NEW MOD 45 MIN: CPT | Performed by: PHYSICAL MEDICINE & REHABILITATION

## 2024-12-03 NOTE — PATIENT INSTRUCTIONS
1) Please call and schedule your MRI of the Lumbar spine at [phone].  Once you have your MRI scheduled, then call my office again to schedule a follow-up visit soon after your MRI so we may review the images together.  2) Depending on findings, we can consider epidural steroid injection with caution and discussion regarding steroids given osteoporosis.   3) Continue Celebrex 200 gm daily  4) Continue gabapentin 600 mg three times per day  5) Continue Tramadol. Try to avoid Percocet (oxycodone)  6) Tylenol 500-1000 mg every 6-8 hours as needed for pain.  No more than 3000 mg daily.  7) Lets follow up to review the MRI images and discuss next steps

## 2024-12-03 NOTE — H&P
[facility]  Clinic H&P    Requesting Physician: Farrukh Tam MD    Chief Complaint (Reason for Visit):    Chief Complaint   Patient presents with    New Patient     New right handed patient here for lower back pain that radiates down the right leg. Pain 7/10. No T/N. Started 4 month ago after laying down patient states she thought is could be from the bilateral knee replacement 2022. Xray lumbar spine 6/18/24. Completed Pt with no relief. Gabapentin ,oxycodone and tramadol with relief.        History of Present Illness:  The patient is a 69 year old right-handed female with a significant past medical history of diabetes, GERD, hypertension, hyperlipidemia, rheumatoid arthritis who presents with right-sided low back pain with radiation down the right leg that has been ongoing for the last 4 months without an inciting event.  She rates her pain a 7 out of 10, sharp and aching, with radiation into the anterior tibia and foot.  Her symptoms are aggravated by standing and walking and improved by sitting.  She does have a history of bilateral knee replacements back in 2022.  She has tried physical therapy for this with [facility].  Her last session of treatment was in October 2024.  I have reviewed those notes..  She has also been treated by rheumatology with continued symptoms.  She has had x-rays of the lumbar spine.  No MRI.  She denies any recent injections.  She is currently on gabapentin, oxycodone, and tramadol for pain control.    PAST MEDICAL HISTORY:   Past Medical History:    Adenomatosis, biliary    Diabetes (HCC)    Esophageal reflux    Essential hypertension    High blood pressure    OA (osteoarthritis)    RA (rheumatoid arthritis) (HCC)    Visual impairment    glasses       PAST SURGICAL HISTORY:   Past Surgical History:   Procedure Laterality Date    Cholecystectomy  03/25/2021    Colonoscopy N/A 10/7/2020    Procedure: COLONOSCOPY;  Surgeon: Keshav Saucedo MD;   Location: [facility] ENDOSCOPY    Hysterectomy      Knee replacement surgery Right 05/23/2022    Total knee replacement Right 05/23/2022    Tubal ligation          FAMILY HISTORY:   Family History   Problem Relation Age of Onset    Diabetes Mother     Hypertension Mother     No Known Problems Father     Diabetes Sister     Diabetes Brother     Glaucoma Neg     Macular degeneration Neg        SOCIAL HISTORY:   Social History     Occupational History    Not on file   Tobacco Use    Smoking status: Never    Smokeless tobacco: Never   Vaping Use    Vaping status: Never Used   Substance and Sexual Activity    Alcohol use: Not Currently    Drug use: Never    Sexual activity: Not on file       CURRENT MEDICATIONS:   Current Outpatient Medications   Medication Sig Dispense Refill    methotrexate 2.5 MG Oral Tab Take 8 tablets (20 mg total) by mouth once a week. 103 tablet 1    hydroxychloroquine 200 MG Oral Tab Take 2 tablets (400 mg total) by mouth daily. 180 tablet 1    folic acid 1 MG Oral Tab Take 1 tablet (1 mg total) by mouth daily. 90 tablet 1    gabapentin 600 MG Oral Tab Take 1 tablet (600 mg total) by mouth 3 (three) times daily. 90 tablet 2    Valsartan-hydroCHLOROthiazide 320-25 MG Oral Tab Take 1 tablet by mouth every morning.      alendronate 70 MG Oral Tab Take 1 tablet (70 mg total) by mouth once a week. Take once weekly in AM, at least 30 minutes before the first food, beverage, or medication of the day. 12 tablet 3    Certolizumab Pegol (CIMZIA) 2 X 200 MG/ML Subcutaneous Prefilled Syringe Kit Inject 400 mg into the skin every 28 days. 1 each 5    CELECOXIB 200 MG Oral Cap TAKE 1 CAPSULE(200 MG) BY MOUTH DAILY 30 capsule 0    oxyCODONE-acetaminophen (PERCOCET) 5-325 MG Oral Tab Take 1 tablet by mouth every 6 (six) hours as needed for Pain. 20 tablet 0    traMADol 50 MG Oral Tab Take 1 tablet (50 mg total) by mouth every 6 (six) hours as needed for Pain. No alcohol or driving on this med. Stop if lethargic or  hallucinating. 20 tablet 0    amitriptyline 10 MG Oral Tab Take 1 tablet (10 mg total) by mouth nightly.      aspirin 325 MG Oral Tab Take 1 tablet (325 mg total) by mouth 2 (two) times daily. 28 tablet 0    polyethylene glycol, PEG 3350, 17 g Oral Powd Pack Take 17 g by mouth daily as needed. 24 each 0    FEROSUL 325 (65 Fe) MG Oral Tab       metFORMIN HCl 500 MG Oral Tab Take 1 tablet (500 mg total) by mouth daily.  1    Certolizumab Pegol (CIMZIA STARTER KIT) 6 X 200 MG/ML Subcutaneous Prefilled Syringe Kit Inject 400 mg into the skin every 14 (fourteen) days. (Patient not taking: Reported on 12/3/2024) 1 each 0    losartan Potassium 50 MG Oral Tab Take by mouth daily. (Patient not taking: Reported on 12/3/2024)          ALLERGIES:   Allergies[1]      REVIEW OF SYSTEMS:   Review of Systems   Constitutional: Negative.    HENT: Negative.    Eyes: Negative.    Respiratory: Negative.    Cardiovascular: Negative.    Gastrointestinal: Negative.    Genitourinary: Negative.    Musculoskeletal: As per HPI   Skin: Negative.    Neurological: As per HPI  Endo/Heme/Allergies: Negative.    Psychiatric/Behavioral: Negative.      All other systems reviewed and are negative. Pertinent positives and negatives noted in the HPI.        PHYSICAL EXAM:   Wt 154 lb (69.9 kg)   BMI 27.28 kg/m²     Body mass index is 27.28 kg/m².      General: No immediate distress  Head: Normocephalic/ Atraumatic  Eyes: Extra-occular movements intact.   Ears: No auricular hematoma or deformities  Mouth: No lesions or ulcerations  Heart: peripheral pulses intact. Normal capillary refill.   Lungs: Non-labored respirations  Abdomen: No abdominal guarding  Extremities: No lower extremity edema bilaterally   Skin: No lesions noted.   Cognition: alert & oriented x 3, attentive, able to follow 2 step commands, comprehention intact, spontaneous speech intact  Motor:    Musculoskeletal:    LUMBAR SPINE:  Inspection: no erythema, swelling, or obvious deformity.   Their iliac crest and shoulder heights are symmetrical.  Postural exam reveals no scoliosis or kyphosis.  Palpation: Tender to palpation midline lumbar spine as well as bilateral lower lumbar facets and paraspinals (right greater than left) RIGHT, right glued and piriformis, right gluteal tendons and greater trochanter  ROM: Full active range of motion of the lumbar spine with pain during extension  Motor: 5/5 in all myotomes of the BILATERAL lower extremities except 4 out of 5 during bilateral great toe extension (L5) and RIGHT plantarflexion (S1)  Sensation: Intact to light touch in all dermatomes of the lower extremities   Reflexes: 1/4 at L4 and S1  Facet Loading: no specific facet pain  MAI: Negative  Straight leg raise: negative for radicular pain symptoms  Slump test: negative for pain symptoms or radicular pain symptoms      Gait:  Normal    Data  [facility] Lab Encounter on 10/16/2024   Component Date Value Ref Range Status    Sed Rate 10/16/2024 9  0 - 30 mm/Hr Final    Albumin 10/16/2024 4.4  3.2 - 4.8 g/dL Final    ALT 10/16/2024 17  10 - 49 U/L Final    AST 10/16/2024 25  <34 U/L Final    WBC 10/16/2024 5.0  4.0 - 11.0 x10(3) uL Final    RBC 10/16/2024 3.36 (L)  3.80 - 5.30 x10(6)uL Final    HGB 10/16/2024 11.1 (L)  12.0 - 16.0 g/dL Final    HCT 10/16/2024 34.6 (L)  35.0 - 48.0 % Final    MCV 10/16/2024 103.0 (H)  80.0 - 100.0 fL Final    MCH 10/16/2024 33.0  26.0 - 34.0 pg Final    MCHC 10/16/2024 32.1  31.0 - 37.0 g/dL Final    RDW-SD 10/16/2024 49.3 (H)  35.1 - 46.3 fL Final    RDW 10/16/2024 13.1  11.0 - 15.0 % Final    PLT 10/16/2024 184.0  150.0 - 450.0 10(3)uL Final    Neutrophil Absolute Prelim 10/16/2024 1.67  1.50 - 7.70 x10 (3) uL Final    Neutrophil Absolute 10/16/2024 1.67  1.50 - 7.70 x10(3) uL Final    Lymphocyte Absolute 10/16/2024 2.58  1.00 - 4.00 x10(3) uL Final    Monocyte Absolute 10/16/2024 0.60  0.10 - 1.00 x10(3) uL Final    Eosinophil Absolute 10/16/2024 0.09  0.00 - 0.70 x10(3) uL  Final    Basophil Absolute 10/16/2024 0.02  0.00 - 0.20 x10(3) uL Final    Immature Granulocyte Absolute 10/16/2024 0.01  0.00 - 1.00 x10(3) uL Final    Neutrophil % 10/16/2024 33.6  % Final    Lymphocyte % 10/16/2024 51.9  % Final    Monocyte % 10/16/2024 12.1  % Final    Eosinophil % 10/16/2024 1.8  % Final    Basophil % 10/16/2024 0.4  % Final    Immature Granulocyte % 10/16/2024 0.2  % Final    C-Reactive Protein 10/16/2024 <0.40  <1.00 mg/dL Final    Creatinine 10/16/2024 1.00  0.55 - 1.02 mg/dL Final    eGFR-Cr 10/16/2024 62  >=60 mL/min/1.73m2 Final   [facility] Lab Encounter on 06/18/2024   Component Date Value Ref Range Status    Albumin 06/18/2024 4.1  3.2 - 4.8 g/dL Final    ALT 06/18/2024 14  10 - 49 U/L Final    AST 06/18/2024 16  <=34 U/L Final    Creatinine 06/18/2024 1.20 (H)  0.55 - 1.02 mg/dL Final    eGFR-Cr 06/18/2024 50 (L)  >=60 mL/min/1.73m2 Final    Vitamin D, 25OH, Total 06/18/2024 57.8  30.0 - 100.0 ng/mL Final   ]      Radiology Imaging:  I reviewed with the patient her X-ray of the lumbar spine from 6/18/2024  XR LUMBAR SPINE (MIN 4 VIEWS) (CPT=72110)  Narrative: PROCEDURE: XR LUMBAR SPINE (MIN 4 VIEWS) (CPT=72110)     COMPARISON: None.     INDICATIONS: Chronic bilateral low back pain radiating down left leg.  No injury.     TECHNIQUE: Lumbar spine radiographs (minimum 4 views)       FINDINGS:      ALIGNMENT:   Normal alignment.    VERTEBRAL BODIES:   No acute fracture or malalignment.  There is multilevel marginal osteophyte formation and facet arthropathy.  DISC SPACES: Mild-to-moderate multilevel disc space narrowing most significant at L5-S1.  SACROILIAC JOINTS: Intact.  OBLIQUE VIEWS: No pars interarticularis defect  OTHER: Negative.                Impression: CONCLUSION:      Mild-to-moderate lumbar spine degenerative change.           Dictated by (CST): Alex Juan MD on 6/18/2024 at 10:02 AM       Finalized by (CST): Alex Juan MD on 6/18/2024 at 10:02 AM               ASSESSMENT AND PLAN:  Alice is a pleasant 69-year-old female presents with right-sided low back pain with radiation down the right leg which I believe is due to a lumbar radiculopathy.  On exam, she does have weakness during right great toe extension and plantarflexion.  I am recommending an MRI of the lumbar spine to further investigate her symptoms.  I am also recommending she continue Celebrex, gabapentin, and tramadol for pain.  I have asked her to discontinue and avoid Percocet.  She can also use Tylenol if needed for pain.  I would like to follow-up with her to review the MRI images and discuss the next steps which may include an epidural steroid injection.  We have discussed her DEXA scan findings which does demonstrate osteoporosis primarily in the lumbar spine.  Will have a discussion once we see what the MRI shows to determine if we should proceed with the procedure giving her increased risk of compression fracture.       RTC in 4 to 6 weeks for MRI review    Discharge Instructions were provided as documented in AVS summary.  The patient was in agreement with the assessment and plan.  All questions were answered.  There were no barriers to learning.         1. Mechanical low back pain    2. Biomechanical lesion    3. Myalgia    4. Lumbar spondylosis    5. Degeneration of intervertebral disc of lumbosacral region with discogenic back pain and lower extremity pain    6. Facet syndrome, lumbar    7. Lumbar foraminal stenosis    8. Bulge of lumbar disc without myelopathy    9. Lumbar radiculopathy    10. Coronary artery calcification of native artery    11. Type 2 diabetes mellitus without retinopathy (HCC)    12. Rheumatoid arthritis of other site with positive rheumatoid factor (HCC)    13. History of bilateral knee replacement    14. Age-related osteoporosis without current pathological fracture        Alex B. Behar MD, [facility]  Physical Medicine and Rehabilitation/Sports Medicine  [place]  [facility] [place]             [1]   Allergies  Allergen Reactions    Enalapril Coughing     cough

## 2024-12-20 DIAGNOSIS — M05.9 SEROPOSITIVE RHEUMATOID ARTHRITIS (HCC): ICD-10-CM

## 2024-12-20 RX ORDER — CERTOLIZUMAB PEGOL 200 MG/ML
INJECTION, SOLUTION SUBCUTANEOUS
Qty: 1 EACH | Refills: 5 | Status: SHIPPED | OUTPATIENT
Start: 2024-12-20

## 2024-12-20 NOTE — TELEPHONE ENCOUNTER
LOV: 10/16/24  Future Appointments   Date Time Provider Department Center   1/6/2025  7:30 AM [facility] MRI RM1 (1.5T) [facility] [place]   2/17/2025  9:00 AM [facility] Castaneda [id number]       ASSESSMENT/PLAN:      Seropositive RA (+RF and CCP)- improved   - she had a +quant TB in 2017 but treated with rifampin for 4 mos in Jan 2018, will need annual CXR to monitor  - she was on Humira and Enbrel but became ineffective  - now on Cimizia monthly since June 2024 and joints doing better  - Continue methotrexate 8 pills weekly and folic acid daily and  mg daily  - Patient will see ophthalmology April 23, 2024  - Blood work today,  blood work every 3 mos      Osteoporosis   - Bone density done 4/2024 showing osteopenia in hip and osteoporosis in the lumbar spine  - She will continue fosomax weekly, started 6/2024  - she will also take calcium and vitamin D     Lower back pain   - XR showing moderated disc disease and narrowing in L5-S1  - she did PT but did not help  - will refer to physiatrist      Myofascial pain syndrome  - Continue gabapentin 600 mg TID     B/L TKR  - still having some knee pain, advised to see orthopedic   +Quant TB  - treated in Jan 2018     Pt will f/u in 3-4 mos      There is a longitudinal care relationship with me, the care plan reflects the ongoing nature of the continuous relationship of care, and the medical record indicates that there is ongoing treatment of a serious/complex medical condition which I am currently managing.  is Applicable.      Blanca Castaneda MD  10/16/2024  10:37 AM

## 2025-01-06 ENCOUNTER — HOSPITAL ENCOUNTER (OUTPATIENT)
Dept: MRI IMAGING | Age: 70
Discharge: HOME | End: 2025-01-06
Attending: PHYSICAL MEDICINE & REHABILITATION
Payer: MEDICAID

## 2025-01-06 DIAGNOSIS — M79.10 MYALGIA: ICD-10-CM

## 2025-01-06 DIAGNOSIS — M99.9 BIOMECHANICAL LESION: ICD-10-CM

## 2025-01-06 DIAGNOSIS — M54.59 MECHANICAL LOW BACK PAIN: ICD-10-CM

## 2025-01-06 DIAGNOSIS — E11.9 TYPE 2 DIABETES MELLITUS WITHOUT RETINOPATHY (HCC): ICD-10-CM

## 2025-01-06 DIAGNOSIS — Z96.653 HISTORY OF BILATERAL KNEE REPLACEMENT: ICD-10-CM

## 2025-01-06 DIAGNOSIS — I25.84 CORONARY ARTERY CALCIFICATION OF NATIVE ARTERY: ICD-10-CM

## 2025-01-06 DIAGNOSIS — M47.816 LUMBAR SPONDYLOSIS: ICD-10-CM

## 2025-01-06 DIAGNOSIS — I25.10 CORONARY ARTERY CALCIFICATION OF NATIVE ARTERY: ICD-10-CM

## 2025-01-06 DIAGNOSIS — M51.369 BULGE OF LUMBAR DISC WITHOUT MYELOPATHY: ICD-10-CM

## 2025-01-06 DIAGNOSIS — M54.16 LUMBAR RADICULOPATHY: ICD-10-CM

## 2025-01-06 DIAGNOSIS — M47.816 FACET SYNDROME, LUMBAR: ICD-10-CM

## 2025-01-06 DIAGNOSIS — M48.061 LUMBAR FORAMINAL STENOSIS: ICD-10-CM

## 2025-01-06 DIAGNOSIS — M51.372 DEGENERATION OF INTERVERTEBRAL DISC OF LUMBOSACRAL REGION WITH DISCOGENIC BACK PAIN AND LOWER EXTREMITY PAIN: ICD-10-CM

## 2025-01-06 DIAGNOSIS — M05.7A RHEUMATOID ARTHRITIS OF OTHER SITE WITH POSITIVE RHEUMATOID FACTOR (HCC): Chronic | ICD-10-CM

## 2025-01-06 PROCEDURE — 72148 MRI LUMBAR SPINE W/O DYE: CPT | Performed by: PHYSICAL MEDICINE & REHABILITATION

## 2025-02-17 ENCOUNTER — TELEPHONE (OUTPATIENT)
Dept: RHEUMATOLOGY | Facility: CLINIC | Age: 70
End: 2025-02-17

## 2025-02-17 ENCOUNTER — OFFICE VISIT (OUTPATIENT)
Dept: RHEUMATOLOGY | Facility: CLINIC | Age: 70
End: 2025-02-17

## 2025-02-17 ENCOUNTER — LAB ENCOUNTER (OUTPATIENT)
Dept: LAB | Facility: HOSPITAL | Age: 70
End: 2025-02-17
Attending: INTERNAL MEDICINE
Payer: MEDICAID

## 2025-02-17 VITALS
HEART RATE: 74 BPM | SYSTOLIC BLOOD PRESSURE: 121 MMHG | DIASTOLIC BLOOD PRESSURE: 73 MMHG | BODY MASS INDEX: 28 KG/M2 | HEIGHT: 63 IN | WEIGHT: 158 LBS

## 2025-02-17 DIAGNOSIS — Z51.81 THERAPEUTIC DRUG MONITORING: ICD-10-CM

## 2025-02-17 DIAGNOSIS — G89.29 CHRONIC BILATERAL LOW BACK PAIN WITHOUT SCIATICA: ICD-10-CM

## 2025-02-17 DIAGNOSIS — M05.9 SEROPOSITIVE RHEUMATOID ARTHRITIS (HCC): ICD-10-CM

## 2025-02-17 DIAGNOSIS — M05.9 SEROPOSITIVE RHEUMATOID ARTHRITIS (HCC): Primary | ICD-10-CM

## 2025-02-17 DIAGNOSIS — M54.50 CHRONIC BILATERAL LOW BACK PAIN WITHOUT SCIATICA: ICD-10-CM

## 2025-02-17 LAB
ALBUMIN SERPL-MCNC: 4.3 G/DL (ref 3.2–4.8)
ALT SERPL-CCNC: 18 U/L
AST SERPL-CCNC: 33 U/L (ref ?–34)
BASOPHILS # BLD AUTO: 0.02 X10(3) UL (ref 0–0.2)
BASOPHILS NFR BLD AUTO: 0.4 %
CREAT BLD-MCNC: 1.14 MG/DL
CRP SERPL-MCNC: <0.4 MG/DL (ref ?–1)
DEPRECATED RDW RBC AUTO: 50.9 FL (ref 35.1–46.3)
EGFRCR SERPLBLD CKD-EPI 2021: 52 ML/MIN/1.73M2 (ref 60–?)
EOSINOPHIL # BLD AUTO: 0.07 X10(3) UL (ref 0–0.7)
EOSINOPHIL NFR BLD AUTO: 1.4 %
ERYTHROCYTE [DISTWIDTH] IN BLOOD BY AUTOMATED COUNT: 13.6 % (ref 11–15)
ERYTHROCYTE [SEDIMENTATION RATE] IN BLOOD: 10 MM/HR
HCT VFR BLD AUTO: 36.1 %
HGB BLD-MCNC: 11.9 G/DL
IMM GRANULOCYTES # BLD AUTO: 0.03 X10(3) UL (ref 0–1)
IMM GRANULOCYTES NFR BLD: 0.6 %
LYMPHOCYTES # BLD AUTO: 2.04 X10(3) UL (ref 1–4)
LYMPHOCYTES NFR BLD AUTO: 42.1 %
MCH RBC QN AUTO: 33.4 PG (ref 26–34)
MCHC RBC AUTO-ENTMCNC: 33 G/DL (ref 31–37)
MCV RBC AUTO: 101.4 FL
MONOCYTES # BLD AUTO: 0.58 X10(3) UL (ref 0.1–1)
MONOCYTES NFR BLD AUTO: 12 %
NEUTROPHILS # BLD AUTO: 2.11 X10 (3) UL (ref 1.5–7.7)
NEUTROPHILS # BLD AUTO: 2.11 X10(3) UL (ref 1.5–7.7)
NEUTROPHILS NFR BLD AUTO: 43.5 %
PLATELET # BLD AUTO: 178 10(3)UL (ref 150–450)
RBC # BLD AUTO: 3.56 X10(6)UL
WBC # BLD AUTO: 4.9 X10(3) UL (ref 4–11)

## 2025-02-17 PROCEDURE — 36415 COLL VENOUS BLD VENIPUNCTURE: CPT

## 2025-02-17 PROCEDURE — 82040 ASSAY OF SERUM ALBUMIN: CPT

## 2025-02-17 PROCEDURE — 84460 ALANINE AMINO (ALT) (SGPT): CPT

## 2025-02-17 PROCEDURE — 84450 TRANSFERASE (AST) (SGOT): CPT

## 2025-02-17 PROCEDURE — 85652 RBC SED RATE AUTOMATED: CPT

## 2025-02-17 PROCEDURE — 99214 OFFICE O/P EST MOD 30 MIN: CPT | Performed by: INTERNAL MEDICINE

## 2025-02-17 PROCEDURE — 86140 C-REACTIVE PROTEIN: CPT

## 2025-02-17 PROCEDURE — 85025 COMPLETE CBC W/AUTO DIFF WBC: CPT

## 2025-02-17 PROCEDURE — 82565 ASSAY OF CREATININE: CPT

## 2025-02-17 NOTE — PATIENT INSTRUCTIONS
You were seen today for rheumatoid arthritis  Continue methotrexate and Plaquenil  Continue Cimzia injections  Blood work today  I gave you referral to the eye doctor  Follow-up in 4 to 5 months, June or July

## 2025-02-17 NOTE — PROGRESS NOTES
Kat Lara is a 69 year old female.    HPI:     Chief Complaint   Patient presents with    Rheumatoid Arthritis    Eye Problem     B/L Eye sees blurry      I had the pleasure of seeing Kat Lara on 2/17/2025 for follow up Seropositve RA (+RF and CCP).    Current Medications:  Cimzia 400 mg every month- started 6/2024  MTX 8 pills weekly and FA daily   mg daily- started 5/2023  Celebrex 200 mg daily  Gabapentin 600 mg TID  Elavil 10 mg daily   Fosomax weekly- started 6/2024  Previous medications:  Humira evrey 2 weeks- started Aug 2020- 8/2023  Enbrel weekly- started 8/2023- 12/2023 stopped due to injection site reaction  Blood work:  Vitamin D 48, Hgb 9.2  ABIMBOLA 1:1280  RF 17.9,  ESR 26, CRP 6.9 mg/L  +Quant TB- treated with Rifampin in Jan 2018  Synovial fluid cell count of 38,405, no crystals  Bone density 4/2024  LFN T-score    -2.2  LTH                 -1.7  LS                    -3.3    Interval History:  This is a 63 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   She was then seen by rheumatologist Dr. Falguni Owusu and diagnosed with RA.  She is found to have elevated RF and inflammatory markers.  Started on methotrexate 6 pills weekly and meloxicam 15 mg daily in the summer 2018  She states that the medications are not working.  She also had her right shoulder and knee injected with steroids.  She continues to complain of right shoulder, knee and ankle pain.  She has to use a walker right now due to the pain.  She also has intermittent swelling in her knee and ankle.    8/13/2021:  Presents for follow-up of seropositive RA  Currently on Humira every 2 weeks and methotrexate  Joints are doing very well.  Reports some pain in her left elbow but no swelling  Continues to have bilateral knee pain.  She has had cortisone injections and gel injections with minimal improvement.  During her last visit she was  given referral to orthopedic but has not seen them  Denies any other joint pain or swelling.  Recent blood work reviewed, inflammatory markers are normal    11/19/2021:  Presents for follow-up of seropositive RA  Currently on methotrexate 6 pills weekly  Has not been taking Humira for the past month  Since stopped taking Humira knee pain is better  XR L knee shows progressive severe OA of the medial compartment  XR R knee shows moderate narrowing of medial and lateral cmpts  Still has not seen orthopedic, have given her 2 referrals already  Has some pain and swelling in ankles otherwise no pain in hands, elbows or shoulders  Reports some lower back pain mostly on the R side no radiculopathy symptoms     2/18/2022:  Presents for follow-up of seropositive RA  Currently on methotrexate 6 pills weekly  She was seen by orthopedic recently and considering surgery for her knee.  They ordered an MRI of her right knee.  Now back on Humira but taking it weekly. Suppose to be on it every 2 weeks  Also on methotrexate 6 pills weekly  Continue to have a lot of pain in R knee  Also having some R sided back pain, no radiculopathy symptoms     6/4/2022:  Presents for follow-up of seropositive RA  Currently on Humira every 2 weeks and methotrexate 6 pills weekly  She had right knee replacement on 5/23  Has R knee pain but otherwise no other joint pain or swelling  Plans on having L knee replaced in the next 2-3 mos   Doing at home physical therapy    10/4/2022:  Presents for follow-up of seropositive RA  Currently on Humira every 2 weeks and methotrexate 6 pills weekly  R knee replaced in May 2022 and doing well  Going to have the Left replaced in November   Has been able to walk better after the knee replacement  No other joint pain but hands and fingers will get stuck at times, feels like they are cramping   Shoulders are good     1/9/2023  Presents for follow-up of seropositive RA  Currently on Humira every 2 weeks and  methotrexate 6 pills weekly  Had L knee replacement 11/28 and doing well  Having more joint pain in hands, elbows. Some swelling in the ankles. Whole body aches.   Has been feeling really cold these days, no fevers or recent infection    2/27/2023:  Presents for follow-up of seropositive RA  Currently on Humira every 2 weeks and methotrexate 6 pills weekly  During last visit was having some pain and swelling in left ankle, medrol dose pack was given and helped but now pain and swelling in left ankle has returned.  Has mild pain in wrists and elbows, no swelling. Some stiffness in hands, can make fist but not tight.  Has some pain in right ankle too  Had b/l knee replacements and has some pain     5/31/2023:  Presents for follow-up of seropositive RA  Currently on Humira every 2 weeks and methotrexate 8 pills weekly  B/L knee's now replaced   Having some knee pain  Also hands feel inflamed and at times fingers get stuck. Able to close hand but not tight  Some pain in left elbow  Some pain in ankles    7/31/2023:  Presents for follow-up of seropositive RA  Currently on Humira every 2 weeks, methotrexate 8 pills weekly and  mg daily  During her last visit she was having some more pain in her hands, left elbow and ankles.  Hydroxychloroquine was added at that time  Continues to have pain in all the joints  Has swelling in the left knee, left knee was replaced   Also muscles hurt too  Pain is no significant, rates it a 6/10  When she goes on prednisone does not help the joints     10/23/2023:  Presents for follow-up of seropositive RA  Currently on methotrexate 8 pills weekly and  mg daily  Now on Enbrel weekly for past 2 mos, was initially on Humira but continued to have joint pain   Having pain everywhere, in the shoulders, elbows, hands and mid back. Also having neck pain. Muscles also hurt  Recent blood work shows normal ESR and CRP    2/23/2024:  Presents for follow-up of seropositive RA  Currently on  methotrexate 8 pills weekly and  mg daily  Stopped Enbrel as she had a injection site reaction about 2 mos ago  Has joint pain in the shoulders, elbows and hands. Hard to make a fist with left hand.  Also has pain in both knees but both of them were replaced  No rashes     6/18/2024:  Presents for follow-up of seropositive RA  Currently on methotrexate 8 pills weekly and  mg daily  She was approved for Cimzia but patient said she called pharmacy and said it was not approved  Reports having pain in lower back region  Has mild pain in the knees, both knees have been replaced  States that the fingers will get stuck in a position and will be hard to move  Mild swelling in the hands  Mild pain in the elbows  Bone density was done showing osteopenia in the hip and osteoporosis in the lumbar spine  No fractures    10/16/2024:  Presents for follow-up of seropositive RA  Currently on methotrexate 8 pills weekly and  mg daily  Also on cimzia monthly since June 2024  Joint pain and swelling has improved, less pain and stiffness  Having pain in lower back, xray showed moderate DJD  She did PT with minimal improvement    2/17/2025:  Presents for follow-up of seropositive RA  Currently on methotrexate 8 pills weekly and  mg daily  Also on cimzia monthly since June 2024, has not been on it for the past 2 month, states it was not delivered   Has minimal pain in the hands, elbows and shoulders  Overall doing really well  She was seen by physiatry Dr. Behar and had MRI lumbar spine showing moderate to severe right foraminal narrowing. She completed PT but states it did not help  She is having some blurryness of the eyes, she saw Dr. Noble             HISTORY:  Past Medical History:    Adenomatosis, biliary    Diabetes (HCC)    Esophageal reflux    Essential hypertension    High blood pressure    OA (osteoarthritis)    RA (rheumatoid arthritis) (HCC)    Visual impairment    glasses      Social Hx  Reviewed   Family Hx Reviewed     Medications (Active prior to today's visit):  Current Outpatient Medications   Medication Sig Dispense Refill    methotrexate 2.5 MG Oral Tab Take 8 tablets (20 mg total) by mouth once a week. 103 tablet 1    hydroxychloroquine 200 MG Oral Tab Take 2 tablets (400 mg total) by mouth daily. 180 tablet 1    folic acid 1 MG Oral Tab Take 1 tablet (1 mg total) by mouth daily. 90 tablet 1    gabapentin 600 MG Oral Tab Take 1 tablet (600 mg total) by mouth 3 (three) times daily. 90 tablet 2    Valsartan-hydroCHLOROthiazide 320-25 MG Oral Tab Take 1 tablet by mouth every morning.      alendronate 70 MG Oral Tab Take 1 tablet (70 mg total) by mouth once a week. Take once weekly in AM, at least 30 minutes before the first food, beverage, or medication of the day. 12 tablet 3    CELECOXIB 200 MG Oral Cap TAKE 1 CAPSULE(200 MG) BY MOUTH DAILY 30 capsule 0    oxyCODONE-acetaminophen (PERCOCET) 5-325 MG Oral Tab Take 1 tablet by mouth every 6 (six) hours as needed for Pain. 20 tablet 0    traMADol 50 MG Oral Tab Take 1 tablet (50 mg total) by mouth every 6 (six) hours as needed for Pain. No alcohol or driving on this med. Stop if lethargic or hallucinating. 20 tablet 0    amitriptyline 10 MG Oral Tab Take 1 tablet (10 mg total) by mouth nightly.      aspirin 325 MG Oral Tab Take 1 tablet (325 mg total) by mouth 2 (two) times daily. 28 tablet 0    polyethylene glycol, PEG 3350, 17 g Oral Powd Pack Take 17 g by mouth daily as needed. 24 each 0    FEROSUL 325 (65 Fe) MG Oral Tab       metFORMIN HCl 500 MG Oral Tab Take 1 tablet (500 mg total) by mouth daily.  1    CIMZIA, 2 SYRINGE, 200 MG/ML Subcutaneous Prefilled Syringe Kit INJECT 400 MG (2 ML) UNDER THE SKIN EVERY 28 DAYS (MAINTENANCE DOSE) (Patient not taking: Reported on 2/17/2025) 1 each 5    Certolizumab Pegol (CIMZIA STARTER KIT) 6 X 200 MG/ML Subcutaneous Prefilled Syringe Kit Inject 400 mg into the skin every 14 (fourteen) days. (Patient  not taking: Reported on 2/17/2025) 1 each 0    losartan Potassium 50 MG Oral Tab Take by mouth daily. (Patient not taking: Reported on 2/17/2025)       .cmed  Allergies:  Allergies   Allergen Reactions    Enalapril Coughing     cough         ROS:   All other ROS are negative.     PHYSICAL EXAM:   GEN: AAOx3, NAD  HEENT: EOMI, PERRLA, no injection or icterus, oral mucosa moist, no oral lesions. No lymphadenopathy. No facial rash  CVS: RRR, no murmurs rubs or gallops. Equal 2+ distal pulses.   LUNGS: CTAB, no increased work of breathing  ABDOMEN:  soft NT/ND, +BS, no HSM  SKIN: No rashes or skin lesions. No nail findings  MSK:  TTP in paraspinal region, trapezius, shoulders, thigh and calves   Cervical spine: FROM  Hands: no synovitis in DIP, PIP and MCP, strong full fists  Wrist: FROM, no pain or swelling or warmth on palpation  Elbow: L elbow mild flexion contracture. R elbow FROM  Shoulders: b/l shoulders FROM   Hip: normal log roll, no lateral hip pain, MAI test negative b/l  Knees: R knee stable  Ankles: no swelling, TTP in b/l ankles   Feet: no pain with MTP squeeze, no toe swelling or pain or warmth on palpation with FROM  Spine: no lumbar or sacral pain on palpation.  NEURO: Cranial nerves II-XII intact grossly. 5/5 strength throughout in both upper and lower extremities, sensation intact.  PSYCH: normal mood       LABS:     May 2019:  Vitamin D 48, Hgb 9.2  RF 17.9, ESR 26, CRP 6.9 mg/L    Previous rheumatologist  Synovial fluid removed showing cell count of 38,405, nucleated cells 83%. Negative for crystals     Imaging:     None    ASSESSMENT/PLAN:     Seropositive RA (+RF and CCP)- improved   - she had a +quant TB in 2017 but treated with rifampin for 4 mos in Jan 2018, will need annual CXR to monitor  - she was on Humira and Enbrel but became ineffective  - now on Cimizia monthly since June 2024 and joints doing better  - Continue methotrexate 8 pills weekly and folic acid daily and  mg daily  -  Patient reports seeing Dr. Noble ophthalmology April 23, 2024  - Blood work today,  blood work every 3 mos     Osteoporosis   - Bone density done 4/2024 showing osteopenia in hip and osteoporosis in the lumbar spine  - She will continue fosomax weekly, started 6/2024  - she will also take calcium and vitamin D    Lower back pain   - XR showing moderated disc disease and narrowing in L5-S1  - she did PT but did not help  - following with physiatry  - MRI lumbar spine showed moderate to severe disc disease    Myofascial pain syndrome  - Continue gabapentin 600 mg TID    B/L TKR  - still having some knee pain, advised to see orthopedic   +Quant TB  - treated in Jan 2018    Pt will f/u in 3-4 mos     There is a longitudinal care relationship with me, the care plan reflects the ongoing nature of the continuous relationship of care, and the medical record indicates that there is ongoing treatment of a serious/complex medical condition which I am currently managing.  is Applicable.     Blanca Castaneda MD  2/17/2025  9:00 AM

## 2025-02-17 NOTE — TELEPHONE ENCOUNTER
Called [facility] to find out why patient can't get her Cimzia delivered. Per [facility] they have attempted to get in touch with patient multiple times with no return call. Patients Cimzia order is ready to ship so patient just needs to call [facility] at [phone] to set-up delivery.     Patient still with provider with  on the line informed patient of above. Patient verbalized understanding.

## 2025-03-04 ENCOUNTER — TELEPHONE (OUTPATIENT)
Dept: PHYSICAL MEDICINE AND REHAB | Facility: CLINIC | Age: 70
End: 2025-03-04

## 2025-03-12 ENCOUNTER — HOSPITAL ENCOUNTER (OUTPATIENT)
Age: 70
Discharge: HOME | End: 2025-03-12
Payer: MEDICAID

## 2025-03-12 ENCOUNTER — APPOINTMENT (OUTPATIENT)
Dept: GENERAL RADIOLOGY | Age: 70
End: 2025-03-12
Attending: NURSE PRACTITIONER
Payer: MEDICAID

## 2025-03-12 VITALS
DIASTOLIC BLOOD PRESSURE: 90 MMHG | RESPIRATION RATE: 20 BRPM | HEART RATE: 91 BPM | SYSTOLIC BLOOD PRESSURE: 159 MMHG | OXYGEN SATURATION: 96 % | TEMPERATURE: 99 F

## 2025-03-12 DIAGNOSIS — R05.9 COUGH: ICD-10-CM

## 2025-03-12 DIAGNOSIS — J18.9 COMMUNITY ACQUIRED PNEUMONIA, UNSPECIFIED LATERALITY: Primary | ICD-10-CM

## 2025-03-12 LAB
POCT INFLUENZA A: NEGATIVE
POCT INFLUENZA B: NEGATIVE
SARS-COV-2 RNA RESP QL NAA+PROBE: NOT DETECTED

## 2025-03-12 PROCEDURE — U0002 COVID-19 LAB TEST NON-CDC: HCPCS | Performed by: NURSE PRACTITIONER

## 2025-03-12 PROCEDURE — 94640 AIRWAY INHALATION TREATMENT: CPT | Performed by: NURSE PRACTITIONER

## 2025-03-12 PROCEDURE — 71046 X-RAY EXAM CHEST 2 VIEWS: CPT | Performed by: NURSE PRACTITIONER

## 2025-03-12 PROCEDURE — 87502 INFLUENZA DNA AMP PROBE: CPT | Performed by: NURSE PRACTITIONER

## 2025-03-12 PROCEDURE — 99214 OFFICE O/P EST MOD 30 MIN: CPT | Performed by: NURSE PRACTITIONER

## 2025-03-12 RX ORDER — ALBUTEROL SULFATE 90 UG/1
2 INHALANT RESPIRATORY (INHALATION) EVERY 4 HOURS PRN
Qty: 1 EACH | Refills: 0 | Status: SHIPPED | OUTPATIENT
Start: 2025-03-12 | End: 2025-04-11

## 2025-03-12 RX ORDER — AMOXICILLIN 500 MG/1
500 TABLET, FILM COATED ORAL 3 TIMES DAILY
Qty: 15 TABLET | Refills: 0 | Status: SHIPPED | OUTPATIENT
Start: 2025-03-12 | End: 2025-03-17

## 2025-03-12 RX ORDER — IPRATROPIUM BROMIDE AND ALBUTEROL SULFATE 2.5; .5 MG/3ML; MG/3ML
3 SOLUTION RESPIRATORY (INHALATION) ONCE
Status: COMPLETED | OUTPATIENT
Start: 2025-03-12 | End: 2025-03-12

## 2025-03-12 RX ORDER — BENZONATATE 100 MG/1
100 CAPSULE ORAL 3 TIMES DAILY PRN
Qty: 30 CAPSULE | Refills: 0 | Status: SHIPPED | OUTPATIENT
Start: 2025-03-12 | End: 2025-03-22

## 2025-03-12 RX ORDER — AZITHROMYCIN 250 MG/1
TABLET, FILM COATED ORAL
Qty: 6 TABLET | Refills: 0 | Status: SHIPPED | OUTPATIENT
Start: 2025-03-12 | End: 2025-03-17

## 2025-03-12 RX ORDER — PREDNISONE 20 MG/1
20 TABLET ORAL DAILY
Qty: 5 TABLET | Refills: 0 | Status: SHIPPED | OUTPATIENT
Start: 2025-03-12 | End: 2025-03-17

## 2025-03-12 NOTE — ED PROVIDER NOTES
Patient Seen in: [facility]      History     Chief Complaint   Patient presents with    Cough/URI     Stated Complaint: Sore Throat; Flu symptoms    Subjective: 69-year-old female, past medical history of hypertension, GERD, rheumatoid arthritis, osteoarthritis, type 2 diabetes, presents to immediate care clinic for evaluation of: Cough, subjective fever, chills, body aches, sore throat for the past 3 days.  Patient has only been taking tea and honey.  No over-the-counter cold medication.  No Tylenol or Motrin.  Patient is coughing on exam without wheezing or stridor.  No chest pain, dizziness, lightheadedness, palpitations, shortness of breath.  Patient denies recent travel.  No known sick contacts.  Otherwise well-appearing despite coughing.  AOx4  The history is provided by the patient. The history is limited by a language barrier. A  was used ([id number]).             Objective:     No pertinent past medical history.            No pertinent past surgical history.              No pertinent social history.            Review of Systems   Constitutional:  Positive for activity change, appetite change, chills, fatigue and fever.   HENT:  Positive for congestion, postnasal drip, rhinorrhea, sneezing and sore throat. Negative for ear discharge, ear pain, sinus pressure and sinus pain.    Eyes: Negative.    Respiratory:  Positive for cough. Negative for chest tightness, shortness of breath, wheezing and stridor.    Cardiovascular: Negative.    Gastrointestinal: Negative.    Musculoskeletal:  Positive for arthralgias and myalgias. Negative for back pain, gait problem, joint swelling, neck pain and neck stiffness.   Skin: Negative.    Neurological:  Positive for headaches. Negative for dizziness, weakness and light-headedness.       Positive for stated complaint: Sore Throat; Flu symptoms  Other systems are as noted in HPI.  Constitutional and vital signs reviewed.      All other systems  reviewed and negative except as noted above.    Physical Exam     ED Triage Vitals [03/12/25 1559]   /90   Pulse 91   Resp 20   Temp 98.5 °F (36.9 °C)   Temp src Oral   SpO2 96 %   O2 Device None (Room air)       Current Vitals:   Vital Signs  BP: 159/90  Pulse: 91  Resp: 20  Temp: 98.5 °F (36.9 °C)  Temp src: Oral    Oxygen Therapy  SpO2: 96 %  O2 Device: None (Room air)        Physical Exam  Constitutional:       General: She is not in acute distress.     Appearance: Normal appearance. She is not ill-appearing or toxic-appearing.   HENT:      Head: Normocephalic.      Jaw: There is normal jaw occlusion.      Right Ear: External ear normal.      Left Ear: External ear normal.      Nose: Nose normal.      Mouth/Throat:      Lips: Pink. No lesions.      Mouth: Mucous membranes are moist. No oral lesions.      Dentition: No gum lesions.      Tongue: No lesions.      Pharynx: Uvula midline. Postnasal drip present. No pharyngeal swelling, oropharyngeal exudate, posterior oropharyngeal erythema or uvula swelling.   Eyes:      General:         Right eye: No discharge.         Left eye: No discharge.      Extraocular Movements: Extraocular movements intact.      Pupils: Pupils are equal, round, and reactive to light.   Cardiovascular:      Rate and Rhythm: Normal rate and regular rhythm.      Pulses: Normal pulses.      Heart sounds: Normal heart sounds.   Pulmonary:      Effort: Pulmonary effort is normal.      Breath sounds: Normal breath sounds.   Musculoskeletal:         General: Normal range of motion.      Cervical back: Normal range of motion.   Lymphadenopathy:      Cervical: No cervical adenopathy.   Skin:     General: Skin is warm.      Capillary Refill: Capillary refill takes less than 2 seconds.   Neurological:      General: No focal deficit present.      Mental Status: She is alert and oriented to person, place, and time.             ED Course     Labs Reviewed   POCT FLU TEST - Normal    Narrative:      This assay is a rapid molecular in vitro test utilizing nucleic acid amplification of influenza A and B viral RNA.   RAPID SARS-COV-2 BY PCR - Normal     Narrative   PROCEDURE: XR CHEST PA + LAT CHEST (CPT=71046)     COMPARISON: [facility], XR CHEST PA + LAT CHEST (XNK=[id number]), 5/03/2022, 4:33 PM.     INDICATIONS: Cough, congestion, midsternal chest pain, and sore throat x 3 days.     TECHNIQUE:   Two views.       Findings and impression:     Normal heart size     Mild vague opacities are present throughout the periphery of the bilateral mid to lower lungs, nonspecific     Very small volume bilateral pleural effusions are present     There is a focal small left retrocardiac opacity.  This is favored to be atelectasis rather than pulmonary consolidation     No pneumothorax  Finalized by (CST): Benjamín Georges MD on 3/12/2025 at 4:56 PM                      MDM      Differentials considered include: COVID-19, influenza A, influenza B, viral URI, viral bronchitis, pneumonia.    Patient bronchospasm on examination.  Decreased breath sounds to bases.  No wheezing.  No rhonchi.  No discernible rales.  Will obtain chest x-ray.    Patient bilateral TMs visualized with good landmarks and light reflex.  No erythema, bulging, perforation, retraction.  No evidence of AOM.    Posterior pharynx evaluated.  No erythema, edema, exudate.  Positive postnasal drip.  Uvula midline and normal in size.  Mucous membranes moist.  No intraoral lesions petechiae.  No cervical lymphadenopathy.  Patient is tolerating her own secretions well without difficulty.  No excessive drooling, stridor, voice change.  No evidence of peritonsillar abscess.  NO EVIDENCE OF Strep pharyngitis.    Patient is negative for influenza A and influenza B.  Patient is negative for COVID-19.    Patient was given DuoNeb for bronchospasm to improvement.  No longer coughing.  Feels improved.    Patient x-ray with questionable opacity.  Will treat for likely  pneumonia based on physical examination and clinical setting.   [id number] used to discharge patient.    She is aware of all medications prescribed and additional supportive and symptomatic management at home.    Educated patient on signs symptoms that would warrant immediate ER evaluation.  She verbalized understanding agrees with plan of care.            Medical Decision Making  Amount and/or Complexity of Data Reviewed  Radiology: ordered.        Disposition and Plan     Clinical Impression:  1. Community acquired pneumonia, unspecified laterality    2. Cough         Disposition:  Discharge  3/12/2025  5:09 pm    Follow-up:  Farrukh Tam MD  [address] [place] [id number]  [phone]    In 7 days  If symptoms worsen          Medications Prescribed:  Discharge Medication List as of 3/12/2025  5:10 PM        START taking these medications    Details   predniSONE 20 MG Oral Tab Take 1 tablet (20 mg total) by mouth daily for 5 days., Normal, Disp-5 tablet, R-0      benzonatate 100 MG Oral Cap Take 1 capsule (100 mg total) by mouth 3 (three) times daily as needed., Normal, Disp-30 capsule, R-0      albuterol 108 (90 Base) MCG/ACT Inhalation Aero Soln Inhale 2 puffs into the lungs every 4 (four) hours as needed for Wheezing., Normal, Disp-1 each, R-0      amoxicillin 500 MG Oral Tab Take 1 tablet (500 mg total) by mouth 3 (three) times daily for 5 days., Normal, Disp-15 tablet, R-0      azithromycin (ZITHROMAX Z-MARY) 250 MG Oral Tab 500 mg once followed by 250 mg daily x 4 days, Normal, Disp-6 tablet, R-0                 Supplementary Documentation:

## 2025-03-12 NOTE — DISCHARGE INSTRUCTIONS
Tiene resultados negativos para COVID-19, influenza A y influenza B. La radiografía de tórax indica posible neumonía. He enviado antibióticos a la farmacia. Dos antibióticos por separado. Empiece hoy mismo. Amoxicilina 3 veces al día melanie 5 días. Azitromicina diariamente melanie 5 días.    También le he recetado un esteroide que me gustaría que empiece a katty mañana por la mañana. Tómelo por la mañana con agua y alimentos. Tenga en cuenta que debe controlar milka niveles de azúcar en dayo con frecuencia mientras esté tomando esteroides, ya que pueden aumentar sheikh nivel de azúcar en dayo. Los esteroides pueden darle mucha energía y aumentar sheikh frecuencia cardíaca.    También le he recetado un supresor de la tos que puede katty hasta 3 veces al día, así bella un inhalador de albuterol para los ataques de tos.    Delmis mucha agua y manténgase yahir hidratado. Duerma en posición elevada y erguida. Duerma con un humidificador. Duchas de vapor para la tos y la congestión. Tylenol y Motrin según sea necesario.    Si no hay mejoría de los síntomas, consulte con sheikh médico de cabecera en 7 a 10 días.    Vaya a urgencias si tiene dolor en el pecho, mareos, aturdimiento, palpitaciones o dificultad para respirar.              You are negative for COVID-19, influenza A, influenza B.  Chest x-ray possible pneumonia.  I have sent antibiotics to the pharmacy.  2 separate antibiotics.  Start today.  Amoxicillin 3 times a day for 5 days.  Azithromycin daily for 5 days.    I have also prescribed a steroid which I would like you to start tomorrow morning.  Take in the morning with food and water.  Please be aware to check your blood sugars diligently while on the steroids as steroids can increase your blood sugar.  Steroids may give you lots of energy and increase your heart rate.    I have also prescribed a cough suppressant you may take up to 3 times a day as well as albuterol inhaler for coughing fits.    Drink plenty of  water and stay well-hydrated.  Sleep elevated and upright.  Sleep with humidifier.  Steam showers for cough and congestion.  Tylenol Motrin as needed.    Follow-up with your primary care doctor in 7 to 10 days if no improvement of symptoms.    Go to ER if there is any chest pain, dizziness, lightheadedness, palpitations, shortness of breath.

## 2025-03-27 ENCOUNTER — TELEPHONE (OUTPATIENT)
Age: 70
End: 2025-03-27

## 2025-03-27 NOTE — TELEPHONE ENCOUNTER
PA start    Prior authorization for: Cimzia     Medication form: 2 X 200MG kit    Submission method: CoverMyMeds    PA Case ID #: [id number]  Green: [id number]    QF-TB result:   Hx of +Quant TB treated in Jan 2018

## 2025-03-27 NOTE — TELEPHONE ENCOUNTER
PA Approved    Prior authorization for: Cimzia     Medication form: 2- 200 mg PFS kit    Approval #: [id number]    Approved dates: 1/1/2025 to 3/27/2026    Pharmacy for medication: [facility]

## 2025-05-06 ENCOUNTER — LAB ENCOUNTER (OUTPATIENT)
Dept: LAB | Age: 70
End: 2025-05-06
Attending: FAMILY MEDICINE
Payer: MEDICAID

## 2025-05-06 ENCOUNTER — OFFICE VISIT (OUTPATIENT)
Dept: PHYSICAL MEDICINE AND REHAB | Facility: CLINIC | Age: 70
End: 2025-05-06
Payer: MEDICAID

## 2025-05-06 ENCOUNTER — TELEPHONE (OUTPATIENT)
Dept: PHYSICAL MEDICINE AND REHAB | Facility: CLINIC | Age: 70
End: 2025-05-06

## 2025-05-06 VITALS
DIASTOLIC BLOOD PRESSURE: 80 MMHG | OXYGEN SATURATION: 98 % | SYSTOLIC BLOOD PRESSURE: 126 MMHG | WEIGHT: 158 LBS | HEIGHT: 63 IN | HEART RATE: 85 BPM | BODY MASS INDEX: 28 KG/M2

## 2025-05-06 DIAGNOSIS — M54.16 LUMBAR RADICULOPATHY: ICD-10-CM

## 2025-05-06 DIAGNOSIS — M51.372 DEGENERATION OF INTERVERTEBRAL DISC OF LUMBOSACRAL REGION WITH DISCOGENIC BACK PAIN AND LOWER EXTREMITY PAIN: ICD-10-CM

## 2025-05-06 DIAGNOSIS — M79.10 MYALGIA: ICD-10-CM

## 2025-05-06 DIAGNOSIS — I10 HTN (HYPERTENSION): ICD-10-CM

## 2025-05-06 DIAGNOSIS — M05.7A RHEUMATOID ARTHRITIS OF OTHER SITE WITH POSITIVE RHEUMATOID FACTOR (HCC): ICD-10-CM

## 2025-05-06 DIAGNOSIS — M54.16 LUMBAR RADICULOPATHY: Primary | ICD-10-CM

## 2025-05-06 DIAGNOSIS — Z96.653 HISTORY OF BILATERAL KNEE REPLACEMENT: ICD-10-CM

## 2025-05-06 DIAGNOSIS — Z00.00 ROUTINE GENERAL MEDICAL EXAMINATION AT A HEALTH CARE FACILITY: Primary | ICD-10-CM

## 2025-05-06 DIAGNOSIS — M81.0 AGE-RELATED OSTEOPOROSIS WITHOUT CURRENT PATHOLOGICAL FRACTURE: ICD-10-CM

## 2025-05-06 DIAGNOSIS — R53.83 FATIGUE: ICD-10-CM

## 2025-05-06 DIAGNOSIS — I25.10 CORONARY ARTERY CALCIFICATION OF NATIVE ARTERY: ICD-10-CM

## 2025-05-06 DIAGNOSIS — I25.84 CORONARY ARTERY CALCIFICATION OF NATIVE ARTERY: ICD-10-CM

## 2025-05-06 DIAGNOSIS — E78.00 PURE HYPERCHOLESTEROLEMIA: ICD-10-CM

## 2025-05-06 DIAGNOSIS — M99.9 BIOMECHANICAL LESION: ICD-10-CM

## 2025-05-06 DIAGNOSIS — M54.59 MECHANICAL LOW BACK PAIN: Primary | ICD-10-CM

## 2025-05-06 DIAGNOSIS — R73.03 BORDERLINE DIABETES: ICD-10-CM

## 2025-05-06 DIAGNOSIS — M48.061 LUMBAR FORAMINAL STENOSIS: ICD-10-CM

## 2025-05-06 DIAGNOSIS — E11.9 TYPE 2 DIABETES MELLITUS WITHOUT RETINOPATHY (HCC): ICD-10-CM

## 2025-05-06 DIAGNOSIS — M47.816 FACET SYNDROME, LUMBAR: ICD-10-CM

## 2025-05-06 DIAGNOSIS — M51.369 BULGE OF LUMBAR DISC WITHOUT MYELOPATHY: ICD-10-CM

## 2025-05-06 DIAGNOSIS — E55.9 VITAMIN D DEFICIENCY: ICD-10-CM

## 2025-05-06 DIAGNOSIS — M47.816 LUMBAR SPONDYLOSIS: ICD-10-CM

## 2025-05-06 LAB
ALBUMIN SERPL-MCNC: 4.2 G/DL (ref 3.2–4.8)
ALBUMIN/GLOB SERPL: 1.6 {RATIO} (ref 1–2)
ALP LIVER SERPL-CCNC: 76 U/L (ref 55–142)
ALT SERPL-CCNC: 15 U/L (ref 10–49)
ANION GAP SERPL CALC-SCNC: 12 MMOL/L (ref 0–18)
AST SERPL-CCNC: 16 U/L (ref ?–34)
BASOPHILS # BLD AUTO: 0.02 X10(3) UL (ref 0–0.2)
BASOPHILS NFR BLD AUTO: 0.3 %
BILIRUB SERPL-MCNC: 0.4 MG/DL (ref 0.2–1.1)
BILIRUB UR QL: NEGATIVE
BUN BLD-MCNC: 29 MG/DL (ref 9–23)
BUN/CREAT SERPL: 23.4 (ref 10–20)
CALCIUM BLD-MCNC: 8.8 MG/DL (ref 8.7–10.4)
CHLORIDE SERPL-SCNC: 109 MMOL/L (ref 98–112)
CHOLEST SERPL-MCNC: 150 MG/DL (ref ?–200)
CLARITY UR: CLEAR
CO2 SERPL-SCNC: 26 MMOL/L (ref 21–32)
CREAT BLD-MCNC: 1.24 MG/DL (ref 0.55–1.02)
CREAT UR-SCNC: 151 MG/DL
DEPRECATED RDW RBC AUTO: 50.1 FL (ref 35.1–46.3)
EGFRCR SERPLBLD CKD-EPI 2021: 47 ML/MIN/1.73M2 (ref 60–?)
EOSINOPHIL # BLD AUTO: 0.14 X10(3) UL (ref 0–0.7)
EOSINOPHIL NFR BLD AUTO: 1.8 %
ERYTHROCYTE [DISTWIDTH] IN BLOOD BY AUTOMATED COUNT: 13.5 % (ref 11–15)
EST. AVERAGE GLUCOSE BLD GHB EST-MCNC: 143 MG/DL (ref 68–126)
FASTING PATIENT LIPID ANSWER: YES
FASTING STATUS PATIENT QL REPORTED: YES
GLOBULIN PLAS-MCNC: 2.6 G/DL (ref 2–3.5)
GLUCOSE BLD-MCNC: 102 MG/DL (ref 70–99)
GLUCOSE UR-MCNC: NORMAL MG/DL
HBA1C MFR BLD: 6.6 % (ref ?–5.7)
HCT VFR BLD AUTO: 33.1 % (ref 35–48)
HDLC SERPL-MCNC: 88 MG/DL (ref 40–59)
HGB BLD-MCNC: 10.4 G/DL (ref 12–16)
HGB UR QL STRIP.AUTO: NEGATIVE
IMM GRANULOCYTES # BLD AUTO: 0.01 X10(3) UL (ref 0–1)
IMM GRANULOCYTES NFR BLD: 0.1 %
KETONES UR-MCNC: NEGATIVE MG/DL
LDLC SERPL CALC-MCNC: 49 MG/DL (ref ?–100)
LEUKOCYTE ESTERASE UR QL STRIP.AUTO: NEGATIVE
LYMPHOCYTES # BLD AUTO: 2.89 X10(3) UL (ref 1–4)
LYMPHOCYTES NFR BLD AUTO: 37.6 %
MCH RBC QN AUTO: 31.7 PG (ref 26–34)
MCHC RBC AUTO-ENTMCNC: 31.4 G/DL (ref 31–37)
MCV RBC AUTO: 100.9 FL (ref 80–100)
MICROALBUMIN UR-MCNC: <0.3 MG/DL
MONOCYTES # BLD AUTO: 0.73 X10(3) UL (ref 0.1–1)
MONOCYTES NFR BLD AUTO: 9.5 %
NEUTROPHILS # BLD AUTO: 3.89 X10 (3) UL (ref 1.5–7.7)
NEUTROPHILS # BLD AUTO: 3.89 X10(3) UL (ref 1.5–7.7)
NEUTROPHILS NFR BLD AUTO: 50.7 %
NITRITE UR QL STRIP.AUTO: NEGATIVE
NONHDLC SERPL-MCNC: 62 MG/DL (ref ?–130)
OSMOLALITY SERPL CALC.SUM OF ELEC: 310 MOSM/KG (ref 275–295)
PH UR: 6 [PH] (ref 5–8)
PLATELET # BLD AUTO: 176 10(3)UL (ref 150–450)
POTASSIUM SERPL-SCNC: 3.5 MMOL/L (ref 3.5–5.1)
PROT SERPL-MCNC: 6.8 G/DL (ref 5.7–8.2)
PROT UR-MCNC: NEGATIVE MG/DL
RBC # BLD AUTO: 3.28 X10(6)UL (ref 3.8–5.3)
SODIUM SERPL-SCNC: 147 MMOL/L (ref 136–145)
SP GR UR STRIP: 1.02 (ref 1–1.03)
TRIGL SERPL-MCNC: 65 MG/DL (ref 30–149)
TSI SER-ACNC: 0.83 UIU/ML (ref 0.55–4.78)
UROBILINOGEN UR STRIP-ACNC: NORMAL
VIT D+METAB SERPL-MCNC: 89.6 NG/ML (ref 30–100)
VLDLC SERPL CALC-MCNC: 9 MG/DL (ref 0–30)
WBC # BLD AUTO: 7.7 X10(3) UL (ref 4–11)

## 2025-05-06 PROCEDURE — 36415 COLL VENOUS BLD VENIPUNCTURE: CPT

## 2025-05-06 PROCEDURE — 80053 COMPREHEN METABOLIC PANEL: CPT

## 2025-05-06 PROCEDURE — 83036 HEMOGLOBIN GLYCOSYLATED A1C: CPT

## 2025-05-06 PROCEDURE — 80061 LIPID PANEL: CPT

## 2025-05-06 PROCEDURE — 82306 VITAMIN D 25 HYDROXY: CPT

## 2025-05-06 PROCEDURE — 84443 ASSAY THYROID STIM HORMONE: CPT

## 2025-05-06 PROCEDURE — 99214 OFFICE O/P EST MOD 30 MIN: CPT | Performed by: PHYSICAL MEDICINE & REHABILITATION

## 2025-05-06 PROCEDURE — 85025 COMPLETE CBC W/AUTO DIFF WBC: CPT

## 2025-05-06 PROCEDURE — 81003 URINALYSIS AUTO W/O SCOPE: CPT

## 2025-05-06 PROCEDURE — 82043 UR ALBUMIN QUANTITATIVE: CPT

## 2025-05-06 PROCEDURE — 82570 ASSAY OF URINE CREATININE: CPT

## 2025-05-06 NOTE — TELEPHONE ENCOUNTER
Patient has been scheduled for Right L5 and S1 Transforaminal Epidural Steroid Injection on 5/16/2025 at [facility] with Dr. Behar.   Anesthesia type:  Local  Arrival Time: 1230pm & Procedure Time: 130pm   Patient was advised that if he/she does receive the covid vaccine it needs to be at least 2 weeks before or after the injection.  Medications and allergies reviewed.  Educated to hold NSAIDS (Aleve, Ibuprofen, Motrin, Advil) and anti-inflammatories (Meloxicam, Naproxen, Diclofenac, Celebrex) and for cervical injections must hold Multi-Vitamins, Vitamin E, Fish Oil/Omega-3.  Patient informed of [facility]'s  policy:  he/she will need a  to and from procedure.   Endoscopy is located in the [facility] [address], [facility], [address]. 2nd floor to check in.  P:  [phone]     may park in the blue/green lot.  Patient verbalized understanding and agrees with plan.  Scheduled in Epic: Yes  Scheduled in Surgical Case: Yes  Follow up appointment made: NOV: 5/16/2025 Behar, Alex, MD  Authorization date valid until 7/5/2025 at [facility].

## 2025-05-06 NOTE — PATIENT INSTRUCTIONS
1) My office will call you to schedule the RIGHT L5 and RIGHT S1 TFESI under local anesthesia once the procedure is approved by your insurance carrier.    2) Continue Gabapentin, Tramadol, and Celebrex  3) Follow up with me 2-4 weeks after the procedure. This can be in the office or virtually.

## 2025-05-06 NOTE — TELEPHONE ENCOUNTER
Initiated authorization for RIGHT L5 and RIGHT S1 TFESI. CPT/HCPCS 46464, 06510 dx:M54.16 to be done at [facility] with [employer] portal.    Status: Approved  Reference/Authorization # [id number]  Valid: 5/6/25-7/5/25  Authorization is not a guarantee of payment and may be subject to review once claim is submitted.

## 2025-05-06 NOTE — PROGRESS NOTES
[facility]  Progress Note    CHIEF COMPLAINT:    Chief Complaint   Patient presents with    Follow - Up     LOV 12/03/2024. Pt here for f/u presents with low back pain that radiates down R leg to foot. Pain is 7/10. Denies N/T. Admits weakness. Taking tramadol, celecoxib, gabapentin, tylenol. Denies PT. MRI L spine 1/6/2025.       History of Present Illness  Kat Lara is a 69 year old female with lumbar disc herniation and arthritis who presents with persistent lower back and leg pain.    Since her last visit in December, she underwent an MRI in January. She notes some improvement in her symptoms, stating that she is now able to run and feels that the tendon in her back has 'loosened a bit'. However, she continues to experience pain, which she rates as a 6 out of 10, primarily when lifting heavy objects. She finds relief by wearing a lumbar brace.    The pain is localized to the right side, affecting her hip and radiating down the entire leg to the back. She describes a sensation in her buttock, likening it to a tendon or nerve irritation, and mentions that her foot feels smaller when she stands.    Her current medications for pain management include Tylenol, Celebrex, gabapentin, and tramadol. She confirms adherence to these medications.    She has a history of lumbar disc herniation, with MRI findings indicating disc protrusion at L4-L5 and L5-S1, causing nerve compression. She also has arthritis, which contributes to her symptoms.       PAST MEDICAL HISTORY:  Past Medical History[1]    SURGICAL HISTORY:  Past Surgical History[2]    SOCIAL HISTORY:   Social History     Occupational History    Not on file   Tobacco Use    Smoking status: Never    Smokeless tobacco: Never   Vaping Use    Vaping status: Never Used   Substance and Sexual Activity    Alcohol use: Not Currently    Drug use: Never    Sexual activity: Not on file       FAMILY HISTORY:    Family History[3]    CURRENT MEDICATIONS:   Current Medications[4]    ALLERGIES:   Allergies[5]    REVIEW OF SYSTEMS:   Review of Systems   Constitutional: Negative.    HENT: Negative.    Eyes: Negative.    Respiratory: Negative.    Cardiovascular: Negative.    Gastrointestinal: Negative.    Genitourinary: Negative.    Musculoskeletal: As per HPI  Skin: Negative.    Neurological: As per HPI  Endo/Heme/Allergies: Negative.    Psychiatric/Behavioral: Negative.      All other systems reviewed and are negative. Pertinent positives and negatives noted in the HPI.    PHYSICAL EXAM:   /80   Pulse 85   Ht 63\"   Wt 158 lb (71.7 kg)   SpO2 98%   BMI 27.99 kg/m²     Body mass index is 27.99 kg/m².      General: No immediate distress  Head: Normocephalic/ Atraumatic  Eyes: Extra-occular movements intact.   Ears: No auricular hematoma or deformities  Mouth: No lesions or ulcerations  Heart: peripheral pulses intact. Normal capillary refill.   Lungs: Non-labored respirations  Abdomen: No abdominal guarding  Extremities: No lower extremity edema bilaterally   Skin: No lesions noted.   Cognition: alert & oriented x 3, attentive, able to follow 2 step commands, comprehension intact, spontaneous speech intact  Motor:    Musculoskeletal:    LUMBAR SPINE:  Inspection: no erythema, swelling, or obvious deformity.  Their iliac crest and shoulder heights are symmetrical.  Postural exam reveals no scoliosis or kyphosis.  Palpation: Tender to palpation midline lumbar spine as well as bilateral lower lumbar facets and paraspinals (right greater than left) RIGHT, right glued and piriformis, right gluteal tendons and greater trochanter  ROM: Full active range of motion of the lumbar spine with pain during extension  Motor: 5/5 in all myotomes of the BILATERAL lower extremities except 4 out of 5 during bilateral great toe extension (L5) and RIGHT plantarflexion (S1)  Sensation: Intact to light touch in all dermatomes of the lower  extremities   Reflexes: 1/4 at L4 and S1  Facet Loading: no specific facet pain  MAI: Negative  Straight leg raise: negative for radicular pain symptoms  Slump test: negative for pain symptoms or radicular pain symptoms        Gait:  Normal    Data  Admission on 03/12/2025, Discharged on 03/12/2025   Component Date Value Ref Range Status    POCT INFLUENZA A 03/12/2025 Negative  Negative Final    POCT INFLUENZA B 03/12/2025 Negative  Negative Final    Rapid SARS-CoV-2 by PCR 03/12/2025 Not Detected  Not Detected Final   [facility] Lab Encounter on 02/17/2025   Component Date Value Ref Range Status    Albumin 02/17/2025 4.3  3.2 - 4.8 g/dL Final    ALT 02/17/2025 18  10 - 49 U/L Final    AST 02/17/2025 33  <34 U/L Final    WBC 02/17/2025 4.9  4.0 - 11.0 x10(3) uL Final    RBC 02/17/2025 3.56 (L)  3.80 - 5.30 x10(6)uL Final    HGB 02/17/2025 11.9 (L)  12.0 - 16.0 g/dL Final    HCT 02/17/2025 36.1  35.0 - 48.0 % Final    MCV 02/17/2025 101.4 (H)  80.0 - 100.0 fL Final    MCH 02/17/2025 33.4  26.0 - 34.0 pg Final    MCHC 02/17/2025 33.0  31.0 - 37.0 g/dL Final    RDW-SD 02/17/2025 50.9 (H)  35.1 - 46.3 fL Final    RDW 02/17/2025 13.6  11.0 - 15.0 % Final    PLT 02/17/2025 178.0  150.0 - 450.0 10(3)uL Final    Neutrophil Absolute Prelim 02/17/2025 2.11  1.50 - 7.70 x10 (3) uL Final    Neutrophil Absolute 02/17/2025 2.11  1.50 - 7.70 x10(3) uL Final    Lymphocyte Absolute 02/17/2025 2.04  1.00 - 4.00 x10(3) uL Final    Monocyte Absolute 02/17/2025 0.58  0.10 - 1.00 x10(3) uL Final    Eosinophil Absolute 02/17/2025 0.07  0.00 - 0.70 x10(3) uL Final    Basophil Absolute 02/17/2025 0.02  0.00 - 0.20 x10(3) uL Final    Immature Granulocyte Absolute 02/17/2025 0.03  0.00 - 1.00 x10(3) uL Final    Neutrophil % 02/17/2025 43.5  % Final    Lymphocyte % 02/17/2025 42.1  % Final    Monocyte % 02/17/2025 12.0  % Final    Eosinophil % 02/17/2025 1.4  % Final    Basophil % 02/17/2025 0.4  % Final    Immature Granulocyte % 02/17/2025 0.6  %  Final    C-Reactive Protein 02/17/2025 <0.40  <1.00 mg/dL Final    Creatinine 02/17/2025 1.14 (H)  0.55 - 1.02 mg/dL Final    eGFR-Cr 02/17/2025 52 (L)  >=60 mL/min/1.73m2 Final    Sed Rate 02/17/2025 10  0 - 30 mm/Hr Final   ]      Radiology Imaging:  I reviewed with the patient her MRI of the lumbar spine   PROCEDURE: MRI SPINE LUMBAR (CPT=72148)     COMPARISON: [facility], XR LUMBAR SPINE (MIN 4 VIEWS) (CPT=72110), 6/18/2024, 9:31 AM.     INDICATIONS: Z96.653 History of bilateral knee replacement M05.7A Rheumatoid arthritis of other site with positive rheumatoid factor (HCC) E11.9 Type 2 diabetes mellitus wi*     TECHNIQUE: A variety of imaging planes and parameters were utilized for visualization of suspected pathology.     FINDINGS:     ALIGNMENT: The lumbar lordosis is intact.  Trace anterolisthesis of L5 on S1.  The lowest fully formed disc space is delineated L5-S1.  PARASPINAL AREA: No mass.  No hematoma.  BONES: The vertebral body heights are maintained.  No acute fracture.  No focal suspicious marrow replacing lesion.  There is a 1.4 cm L4 vertebral body hemangioma.  There are smaller regions of T1/T2 hyperintensity involving the lumbar spine, which may  reflect additional hemangiomas or focal fat.  CORD/CAUDA EQUINA: The conus terminates at L1.  The inferior aspect of the spinal cord is normal in caliber and signal intensity.  No clumping or nodularity of the cauda equina nerve roots.  No intrathecal collection.  OTHER: There is prominence of the common bile duct measuring 11 mm in diameter (1:17, which is favored to be secondary to the post cholecystectomy state.  There is a 10 mm right lower pole renal cyst.     LUMBAR DISC LEVELS:     There is multilevel disc desiccation with loss of normal T2 disc signal.  There is mild disc height loss at L3-L4, L4-L5, and L5-S1.  There are few small ventral disc osteophyte complexes, which are most advanced at L5-S1.  There are scattered  mild  degenerative endplate changes, which are most advanced at L4-L5 and L5-S1.     L1-L2: Trace disc bulge.  Mild facet arthropathy.  No canal stenosis.  No foraminal narrowing.  L2-L3: Trace disc bulge.  Mild facet arthropathy.  No canal stenosis.  No foraminal narrowing.  L3-L4: Small disc bulge with superimposed right foraminal disc protrusion (8:22).  Mild-to-moderate facet arthropathy with trace bilateral facet joint effusions.  Minimal ligamentum flavum hypertrophy.  No canal stenosis.  Moderate right greater than  left foraminal narrowing (4:  12, 4).  L4-L5: Small disc bulge with superimposed central and left foraminal disc protrusions.  Moderate left greater than right facet arthropathy.  Mild ligamentum flavum hypertrophy.  Minimal canal stenosis.  Mild left subarticular zone stenosis.  Severe left  foraminal narrowing with mass effect on the nerve root (4:4, 3:4).  Mild right foraminal narrowing.  L5-S1: Small disc bulge with superimposed shallow central and bilateral foraminal protrusions.  Moderate to severe facet arthropathy with small bilateral facet joint effusions.  There is a 4 x 7 mm anteriorly directed right synovial cyst that abuts the  exiting nerve root in the neural foramen (8:32, 3:13).  There is an additional 4 mm post lead directed left synovial cyst.  No canal stenosis.  Moderate to severe right foraminal narrowing (4:12).  Moderate left foraminal narrowing.               Impression   CONCLUSION:     1. Multilevel degenerative changes of the lumbar spine, which are described in detail above.  2. At L4-L5, there is a small disc bulge with superimposed central and left foraminal protrusions that results in minimal canal stenosis, mild left subarticular zone stenosis, severe left foraminal narrowing with mass effect on exiting nerve, and mild  right foraminal narrowing.  3. At L5-S1, there is a small disc bulge with superimposed shallow bilateral foraminal protrusions as well as moderate  to severe bilateral facet arthropathy with a 7 mm anteriorly directed right synovial cyst that abuts the exiting nerve root.  These  findings result in moderate to severe right foraminal narrowing and moderate left foraminal narrowing.  4. At L3-L4, there is moderate right greater than left foraminal narrowing.  5. No acute fracture or traumatic listhesis of the lumbar spine.  6. Trace anterolisthesis of L5 on S1.  7. Lesser incidental findings described above.           Dictated by (CST): Wang Lopez MD on 1/08/2025 at 6:53 AM      Finalized by (CST): Wang Lopez MD on 1/08/2025 at 7:09 AM         ASSESSMENT AND PLAN:  Assessment & Plan  Lumbar radiculopathy  Chronic lumbar radiculopathy with right-sided symptoms due to disc herniation at L4-L5 and L5-S1 causing L5 and S1 nerve impingement. Pain level 6/10, improved with activity and brace. Informed consent for epidural steroid injection obtained.  - Right L5 and right S1 transforaminal epidural steroid injection under local anesthesia ordered  - Continue gabapentin, tramadol, and celecoxib.  - Schedule follow-up 2-4 weeks post-injection to assess efficacy.    Arthritis  Arthritis contributing to lumbar radiculopathy with MRI showing enlarged arthritic facet joints causing nerve impingement.  - Continue celecoxib for arthritis management.      RTC in 2 to 4 weeks after procedure  Discharge Instructions were provided as documented in AVS summary.  The patient was in agreement with the assessment and plan.  All questions were answered.  There were no barriers to learning.         1. Mechanical low back pain    2. Biomechanical lesion    3. Myalgia    4. Lumbar spondylosis    5. Degeneration of intervertebral disc of lumbosacral region with discogenic back pain and lower extremity pain    6. Lumbar foraminal stenosis    7. Bulge of lumbar disc without myelopathy    8. Lumbar radiculopathy    9. Facet syndrome, lumbar    10. Coronary artery calcification of  native artery    11. Type 2 diabetes mellitus without retinopathy (HCC)    12. Rheumatoid arthritis of other site with positive rheumatoid factor (HCC)    13. History of bilateral knee replacement    14. Age-related osteoporosis without current pathological fracture        Alex B. Behar MD  Physical Medicine and Rehabilitation/Sports Medicine  [facility]  21st Century Cures Act Notice to Patient: Medical documents like this are made available to patients in the interest of transparency. However, be advised this is a medical document and it is intended as [id number] communication between your medical providers. This medical document may contain abbreviations, assessments, medical data, and results or other terms that are unfamiliar. Medical documents are intended to carry relevant information, facts as evident, and the clinical opinion of the practitioner. As such, this medical document may be written in language that appears blunt or direct. You are encouraged to contact your medical provider and/or [facility] Patient Experience if you have any questions about this medical document.   Abridge tool was used for dictation purposes only and the patient was not recorded at any point during the visit.              [1]   Past Medical History:   Adenomatosis, biliary    Diabetes (HCC)    Esophageal reflux    Essential hypertension    High blood pressure    OA (osteoarthritis)    RA (rheumatoid arthritis) (HCC)    Visual impairment    glasses   [2]   Past Surgical History:  Procedure Laterality Date    Cholecystectomy  03/25/2021    Colonoscopy N/A 10/7/2020    Procedure: COLONOSCOPY;  Surgeon: Keshav Saucedo MD;  Location: [facility] ENDOSCOPY    Hysterectomy      Knee replacement surgery Right 05/23/2022    Total knee replacement Right 05/23/2022    Tubal ligation     [3]   Family History  Problem Relation Age of Onset    Diabetes Mother     Hypertension Mother     No Known Problems Father      Diabetes Sister     Diabetes Brother     Glaucoma Neg     Macular degeneration Neg    [4]   Current Outpatient Medications   Medication Sig Dispense Refill    CIMZIA, 2 SYRINGE, 200 MG/ML Subcutaneous Prefilled Syringe Kit INJECT 400 MG (2 ML) UNDER THE SKIN EVERY 28 DAYS (MAINTENANCE DOSE) (Patient not taking: Reported on 2/17/2025) 1 each 5    methotrexate 2.5 MG Oral Tab Take 8 tablets (20 mg total) by mouth once a week. 103 tablet 1    hydroxychloroquine 200 MG Oral Tab Take 2 tablets (400 mg total) by mouth daily. 180 tablet 1    folic acid 1 MG Oral Tab Take 1 tablet (1 mg total) by mouth daily. 90 tablet 1    gabapentin 600 MG Oral Tab Take 1 tablet (600 mg total) by mouth 3 (three) times daily. 90 tablet 2    Valsartan-hydroCHLOROthiazide 320-25 MG Oral Tab Take 1 tablet by mouth every morning.      alendronate 70 MG Oral Tab Take 1 tablet (70 mg total) by mouth once a week. Take once weekly in AM, at least 30 minutes before the first food, beverage, or medication of the day. 12 tablet 3    Certolizumab Pegol (CIMZIA STARTER KIT) 6 X 200 MG/ML Subcutaneous Prefilled Syringe Kit Inject 400 mg into the skin every 14 (fourteen) days. (Patient not taking: Reported on 2/17/2025) 1 each 0    CELECOXIB 200 MG Oral Cap TAKE 1 CAPSULE(200 MG) BY MOUTH DAILY 30 capsule 0    oxyCODONE-acetaminophen (PERCOCET) 5-325 MG Oral Tab Take 1 tablet by mouth every 6 (six) hours as needed for Pain. 20 tablet 0    traMADol 50 MG Oral Tab Take 1 tablet (50 mg total) by mouth every 6 (six) hours as needed for Pain. No alcohol or driving on this med. Stop if lethargic or hallucinating. 20 tablet 0    amitriptyline 10 MG Oral Tab Take 1 tablet (10 mg total) by mouth nightly.      aspirin 325 MG Oral Tab Take 1 tablet (325 mg total) by mouth 2 (two) times daily. 28 tablet 0    polyethylene glycol, PEG 3350, 17 g Oral Powd Pack Take 17 g by mouth daily as needed. 24 each 0    losartan Potassium 50 MG Oral Tab Take by mouth daily.  (Patient not taking: Reported on 2/17/2025)      FEROSUL 325 (65 Fe) MG Oral Tab       metFORMIN HCl 500 MG Oral Tab Take 1 tablet (500 mg total) by mouth daily.  1   [5]   Allergies  Allergen Reactions    Enalapril Coughing     cough

## 2025-05-06 NOTE — PROGRESS NOTES
The following individual(s) verbally consented to be recorded using ambient AI listening technology and understand that they can each withdraw their consent to this listening technology at any point by asking the clinician to turn off or pause the recording:    Patient name: Kat Lara  Additional names:

## 2025-05-06 NOTE — H&P (VIEW-ONLY)
[facility]  Progress Note    CHIEF COMPLAINT:    Chief Complaint   Patient presents with    Follow - Up     LOV 12/03/2024. Pt here for f/u presents with low back pain that radiates down R leg to foot. Pain is 7/10. Denies N/T. Admits weakness. Taking tramadol, celecoxib, gabapentin, tylenol. Denies PT. MRI L spine 1/6/2025.       History of Present Illness  Kat Lara is a 69 year old female with lumbar disc herniation and arthritis who presents with persistent lower back and leg pain.    Since her last visit in December, she underwent an MRI in January. She notes some improvement in her symptoms, stating that she is now able to run and feels that the tendon in her back has 'loosened a bit'. However, she continues to experience pain, which she rates as a 6 out of 10, primarily when lifting heavy objects. She finds relief by wearing a lumbar brace.    The pain is localized to the right side, affecting her hip and radiating down the entire leg to the back. She describes a sensation in her buttock, likening it to a tendon or nerve irritation, and mentions that her foot feels smaller when she stands.    Her current medications for pain management include Tylenol, Celebrex, gabapentin, and tramadol. She confirms adherence to these medications.    She has a history of lumbar disc herniation, with MRI findings indicating disc protrusion at L4-L5 and L5-S1, causing nerve compression. She also has arthritis, which contributes to her symptoms.       PAST MEDICAL HISTORY:  Past Medical History[1]    SURGICAL HISTORY:  Past Surgical History[2]    SOCIAL HISTORY:   Social History     Occupational History    Not on file   Tobacco Use    Smoking status: Never    Smokeless tobacco: Never   Vaping Use    Vaping status: Never Used   Substance and Sexual Activity    Alcohol use: Not Currently    Drug use: Never    Sexual activity: Not on file       FAMILY HISTORY:    Family History[3]    CURRENT MEDICATIONS:   Current Medications[4]    ALLERGIES:   Allergies[5]    REVIEW OF SYSTEMS:   Review of Systems   Constitutional: Negative.    HENT: Negative.    Eyes: Negative.    Respiratory: Negative.    Cardiovascular: Negative.    Gastrointestinal: Negative.    Genitourinary: Negative.    Musculoskeletal: As per HPI  Skin: Negative.    Neurological: As per HPI  Endo/Heme/Allergies: Negative.    Psychiatric/Behavioral: Negative.      All other systems reviewed and are negative. Pertinent positives and negatives noted in the HPI.    PHYSICAL EXAM:   /80   Pulse 85   Ht 63\"   Wt 158 lb (71.7 kg)   SpO2 98%   BMI 27.99 kg/m²     Body mass index is 27.99 kg/m².      General: No immediate distress  Head: Normocephalic/ Atraumatic  Eyes: Extra-occular movements intact.   Ears: No auricular hematoma or deformities  Mouth: No lesions or ulcerations  Heart: peripheral pulses intact. Normal capillary refill.   Lungs: Non-labored respirations  Abdomen: No abdominal guarding  Extremities: No lower extremity edema bilaterally   Skin: No lesions noted.   Cognition: alert & oriented x 3, attentive, able to follow 2 step commands, comprehension intact, spontaneous speech intact  Motor:    Musculoskeletal:    LUMBAR SPINE:  Inspection: no erythema, swelling, or obvious deformity.  Their iliac crest and shoulder heights are symmetrical.  Postural exam reveals no scoliosis or kyphosis.  Palpation: Tender to palpation midline lumbar spine as well as bilateral lower lumbar facets and paraspinals (right greater than left) RIGHT, right glued and piriformis, right gluteal tendons and greater trochanter  ROM: Full active range of motion of the lumbar spine with pain during extension  Motor: 5/5 in all myotomes of the BILATERAL lower extremities except 4 out of 5 during bilateral great toe extension (L5) and RIGHT plantarflexion (S1)  Sensation: Intact to light touch in all dermatomes of the lower  extremities   Reflexes: 1/4 at L4 and S1  Facet Loading: no specific facet pain  MAI: Negative  Straight leg raise: negative for radicular pain symptoms  Slump test: negative for pain symptoms or radicular pain symptoms        Gait:  Normal    Data  Admission on 03/12/2025, Discharged on 03/12/2025   Component Date Value Ref Range Status    POCT INFLUENZA A 03/12/2025 Negative  Negative Final    POCT INFLUENZA B 03/12/2025 Negative  Negative Final    Rapid SARS-CoV-2 by PCR 03/12/2025 Not Detected  Not Detected Final   [facility] Lab Encounter on 02/17/2025   Component Date Value Ref Range Status    Albumin 02/17/2025 4.3  3.2 - 4.8 g/dL Final    ALT 02/17/2025 18  10 - 49 U/L Final    AST 02/17/2025 33  <34 U/L Final    WBC 02/17/2025 4.9  4.0 - 11.0 x10(3) uL Final    RBC 02/17/2025 3.56 (L)  3.80 - 5.30 x10(6)uL Final    HGB 02/17/2025 11.9 (L)  12.0 - 16.0 g/dL Final    HCT 02/17/2025 36.1  35.0 - 48.0 % Final    MCV 02/17/2025 101.4 (H)  80.0 - 100.0 fL Final    MCH 02/17/2025 33.4  26.0 - 34.0 pg Final    MCHC 02/17/2025 33.0  31.0 - 37.0 g/dL Final    RDW-SD 02/17/2025 50.9 (H)  35.1 - 46.3 fL Final    RDW 02/17/2025 13.6  11.0 - 15.0 % Final    PLT 02/17/2025 178.0  150.0 - 450.0 10(3)uL Final    Neutrophil Absolute Prelim 02/17/2025 2.11  1.50 - 7.70 x10 (3) uL Final    Neutrophil Absolute 02/17/2025 2.11  1.50 - 7.70 x10(3) uL Final    Lymphocyte Absolute 02/17/2025 2.04  1.00 - 4.00 x10(3) uL Final    Monocyte Absolute 02/17/2025 0.58  0.10 - 1.00 x10(3) uL Final    Eosinophil Absolute 02/17/2025 0.07  0.00 - 0.70 x10(3) uL Final    Basophil Absolute 02/17/2025 0.02  0.00 - 0.20 x10(3) uL Final    Immature Granulocyte Absolute 02/17/2025 0.03  0.00 - 1.00 x10(3) uL Final    Neutrophil % 02/17/2025 43.5  % Final    Lymphocyte % 02/17/2025 42.1  % Final    Monocyte % 02/17/2025 12.0  % Final    Eosinophil % 02/17/2025 1.4  % Final    Basophil % 02/17/2025 0.4  % Final    Immature Granulocyte % 02/17/2025 0.6  %  Final    C-Reactive Protein 02/17/2025 <0.40  <1.00 mg/dL Final    Creatinine 02/17/2025 1.14 (H)  0.55 - 1.02 mg/dL Final    eGFR-Cr 02/17/2025 52 (L)  >=60 mL/min/1.73m2 Final    Sed Rate 02/17/2025 10  0 - 30 mm/Hr Final   ]      Radiology Imaging:  I reviewed with the patient her MRI of the lumbar spine   PROCEDURE: MRI SPINE LUMBAR (CPT=72148)     COMPARISON: [facility], XR LUMBAR SPINE (MIN 4 VIEWS) (CPT=72110), 6/18/2024, 9:31 AM.     INDICATIONS: Z96.653 History of bilateral knee replacement M05.7A Rheumatoid arthritis of other site with positive rheumatoid factor (HCC) E11.9 Type 2 diabetes mellitus wi*     TECHNIQUE: A variety of imaging planes and parameters were utilized for visualization of suspected pathology.     FINDINGS:     ALIGNMENT: The lumbar lordosis is intact.  Trace anterolisthesis of L5 on S1.  The lowest fully formed disc space is delineated L5-S1.  PARASPINAL AREA: No mass.  No hematoma.  BONES: The vertebral body heights are maintained.  No acute fracture.  No focal suspicious marrow replacing lesion.  There is a 1.4 cm L4 vertebral body hemangioma.  There are smaller regions of T1/T2 hyperintensity involving the lumbar spine, which may  reflect additional hemangiomas or focal fat.  CORD/CAUDA EQUINA: The conus terminates at L1.  The inferior aspect of the spinal cord is normal in caliber and signal intensity.  No clumping or nodularity of the cauda equina nerve roots.  No intrathecal collection.  OTHER: There is prominence of the common bile duct measuring 11 mm in diameter (1:17, which is favored to be secondary to the post cholecystectomy state.  There is a 10 mm right lower pole renal cyst.     LUMBAR DISC LEVELS:     There is multilevel disc desiccation with loss of normal T2 disc signal.  There is mild disc height loss at L3-L4, L4-L5, and L5-S1.  There are few small ventral disc osteophyte complexes, which are most advanced at L5-S1.  There are scattered  mild  degenerative endplate changes, which are most advanced at L4-L5 and L5-S1.     L1-L2: Trace disc bulge.  Mild facet arthropathy.  No canal stenosis.  No foraminal narrowing.  L2-L3: Trace disc bulge.  Mild facet arthropathy.  No canal stenosis.  No foraminal narrowing.  L3-L4: Small disc bulge with superimposed right foraminal disc protrusion (8:22).  Mild-to-moderate facet arthropathy with trace bilateral facet joint effusions.  Minimal ligamentum flavum hypertrophy.  No canal stenosis.  Moderate right greater than  left foraminal narrowing (4:  12, 4).  L4-L5: Small disc bulge with superimposed central and left foraminal disc protrusions.  Moderate left greater than right facet arthropathy.  Mild ligamentum flavum hypertrophy.  Minimal canal stenosis.  Mild left subarticular zone stenosis.  Severe left  foraminal narrowing with mass effect on the nerve root (4:4, 3:4).  Mild right foraminal narrowing.  L5-S1: Small disc bulge with superimposed shallow central and bilateral foraminal protrusions.  Moderate to severe facet arthropathy with small bilateral facet joint effusions.  There is a 4 x 7 mm anteriorly directed right synovial cyst that abuts the  exiting nerve root in the neural foramen (8:32, 3:13).  There is an additional 4 mm post lead directed left synovial cyst.  No canal stenosis.  Moderate to severe right foraminal narrowing (4:12).  Moderate left foraminal narrowing.               Impression   CONCLUSION:     1. Multilevel degenerative changes of the lumbar spine, which are described in detail above.  2. At L4-L5, there is a small disc bulge with superimposed central and left foraminal protrusions that results in minimal canal stenosis, mild left subarticular zone stenosis, severe left foraminal narrowing with mass effect on exiting nerve, and mild  right foraminal narrowing.  3. At L5-S1, there is a small disc bulge with superimposed shallow bilateral foraminal protrusions as well as moderate  to severe bilateral facet arthropathy with a 7 mm anteriorly directed right synovial cyst that abuts the exiting nerve root.  These  findings result in moderate to severe right foraminal narrowing and moderate left foraminal narrowing.  4. At L3-L4, there is moderate right greater than left foraminal narrowing.  5. No acute fracture or traumatic listhesis of the lumbar spine.  6. Trace anterolisthesis of L5 on S1.  7. Lesser incidental findings described above.           Dictated by (CST): Wang Lopez MD on 1/08/2025 at 6:53 AM      Finalized by (CST): Wang Lopez MD on 1/08/2025 at 7:09 AM         ASSESSMENT AND PLAN:  Assessment & Plan  Lumbar radiculopathy  Chronic lumbar radiculopathy with right-sided symptoms due to disc herniation at L4-L5 and L5-S1 causing L5 and S1 nerve impingement. Pain level 6/10, improved with activity and brace. Informed consent for epidural steroid injection obtained.  - Right L5 and right S1 transforaminal epidural steroid injection under local anesthesia ordered  - Continue gabapentin, tramadol, and celecoxib.  - Schedule follow-up 2-4 weeks post-injection to assess efficacy.    Arthritis  Arthritis contributing to lumbar radiculopathy with MRI showing enlarged arthritic facet joints causing nerve impingement.  - Continue celecoxib for arthritis management.      RTC in 2 to 4 weeks after procedure  Discharge Instructions were provided as documented in AVS summary.  The patient was in agreement with the assessment and plan.  All questions were answered.  There were no barriers to learning.         1. Mechanical low back pain    2. Biomechanical lesion    3. Myalgia    4. Lumbar spondylosis    5. Degeneration of intervertebral disc of lumbosacral region with discogenic back pain and lower extremity pain    6. Lumbar foraminal stenosis    7. Bulge of lumbar disc without myelopathy    8. Lumbar radiculopathy    9. Facet syndrome, lumbar    10. Coronary artery calcification of  native artery    11. Type 2 diabetes mellitus without retinopathy (HCC)    12. Rheumatoid arthritis of other site with positive rheumatoid factor (HCC)    13. History of bilateral knee replacement    14. Age-related osteoporosis without current pathological fracture        Alex B. Behar MD  Physical Medicine and Rehabilitation/Sports Medicine  [facility]  21st Century Cures Act Notice to Patient: Medical documents like this are made available to patients in the interest of transparency. However, be advised this is a medical document and it is intended as [id number] communication between your medical providers. This medical document may contain abbreviations, assessments, medical data, and results or other terms that are unfamiliar. Medical documents are intended to carry relevant information, facts as evident, and the clinical opinion of the practitioner. As such, this medical document may be written in language that appears blunt or direct. You are encouraged to contact your medical provider and/or Located within [facility] Patient Experience if you have any questions about this medical document.   Abridge tool was used for dictation purposes only and the patient was not recorded at any point during the visit.              [1]   Past Medical History:   Adenomatosis, biliary    Diabetes (HCC)    Esophageal reflux    Essential hypertension    High blood pressure    OA (osteoarthritis)    RA (rheumatoid arthritis) (HCC)    Visual impairment    glasses   [2]   Past Surgical History:  Procedure Laterality Date    Cholecystectomy  03/25/2021    Colonoscopy N/A 10/7/2020    Procedure: COLONOSCOPY;  Surgeon: Keshav Saucedo MD;  Location: [facility] ENDOSCOPY    Hysterectomy      Knee replacement surgery Right 05/23/2022    Total knee replacement Right 05/23/2022    Tubal ligation     [3]   Family History  Problem Relation Age of Onset    Diabetes Mother     Hypertension Mother     No Known Problems Father      Diabetes Sister     Diabetes Brother     Glaucoma Neg     Macular degeneration Neg    [4]   Current Outpatient Medications   Medication Sig Dispense Refill    CIMZIA, 2 SYRINGE, 200 MG/ML Subcutaneous Prefilled Syringe Kit INJECT 400 MG (2 ML) UNDER THE SKIN EVERY 28 DAYS (MAINTENANCE DOSE) (Patient not taking: Reported on 2/17/2025) 1 each 5    methotrexate 2.5 MG Oral Tab Take 8 tablets (20 mg total) by mouth once a week. 103 tablet 1    hydroxychloroquine 200 MG Oral Tab Take 2 tablets (400 mg total) by mouth daily. 180 tablet 1    folic acid 1 MG Oral Tab Take 1 tablet (1 mg total) by mouth daily. 90 tablet 1    gabapentin 600 MG Oral Tab Take 1 tablet (600 mg total) by mouth 3 (three) times daily. 90 tablet 2    Valsartan-hydroCHLOROthiazide 320-25 MG Oral Tab Take 1 tablet by mouth every morning.      alendronate 70 MG Oral Tab Take 1 tablet (70 mg total) by mouth once a week. Take once weekly in AM, at least 30 minutes before the first food, beverage, or medication of the day. 12 tablet 3    Certolizumab Pegol (CIMZIA STARTER KIT) 6 X 200 MG/ML Subcutaneous Prefilled Syringe Kit Inject 400 mg into the skin every 14 (fourteen) days. (Patient not taking: Reported on 2/17/2025) 1 each 0    CELECOXIB 200 MG Oral Cap TAKE 1 CAPSULE(200 MG) BY MOUTH DAILY 30 capsule 0    oxyCODONE-acetaminophen (PERCOCET) 5-325 MG Oral Tab Take 1 tablet by mouth every 6 (six) hours as needed for Pain. 20 tablet 0    traMADol 50 MG Oral Tab Take 1 tablet (50 mg total) by mouth every 6 (six) hours as needed for Pain. No alcohol or driving on this med. Stop if lethargic or hallucinating. 20 tablet 0    amitriptyline 10 MG Oral Tab Take 1 tablet (10 mg total) by mouth nightly.      aspirin 325 MG Oral Tab Take 1 tablet (325 mg total) by mouth 2 (two) times daily. 28 tablet 0    polyethylene glycol, PEG 3350, 17 g Oral Powd Pack Take 17 g by mouth daily as needed. 24 each 0    losartan Potassium 50 MG Oral Tab Take by mouth daily.  (Patient not taking: Reported on 2/17/2025)      FEROSUL 325 (65 Fe) MG Oral Tab       metFORMIN HCl 500 MG Oral Tab Take 1 tablet (500 mg total) by mouth daily.  1   [5]   Allergies  Allergen Reactions    Enalapril Coughing     cough

## 2025-05-16 ENCOUNTER — APPOINTMENT (OUTPATIENT)
Dept: GENERAL RADIOLOGY | Facility: HOSPITAL | Age: 70
End: 2025-05-16
Attending: PHYSICAL MEDICINE & REHABILITATION
Payer: MEDICAID

## 2025-05-16 ENCOUNTER — HOSPITAL ENCOUNTER (OUTPATIENT)
Facility: HOSPITAL | Age: 70
Setting detail: HOSPITAL OUTPATIENT SURGERY
Discharge: HOME | End: 2025-05-16
Attending: PHYSICAL MEDICINE & REHABILITATION | Admitting: PHYSICAL MEDICINE & REHABILITATION
Payer: MEDICAID

## 2025-05-16 VITALS
OXYGEN SATURATION: 98 % | BODY MASS INDEX: 28 KG/M2 | HEIGHT: 63 IN | SYSTOLIC BLOOD PRESSURE: 130 MMHG | RESPIRATION RATE: 18 BRPM | WEIGHT: 158 LBS | HEART RATE: 79 BPM | DIASTOLIC BLOOD PRESSURE: 69 MMHG

## 2025-05-16 PROBLEM — M48.061 LUMBAR FORAMINAL STENOSIS: Status: ACTIVE | Noted: 2025-05-16

## 2025-05-16 PROBLEM — M54.16 LUMBAR RADICULOPATHY: Status: ACTIVE | Noted: 2025-05-16

## 2025-05-16 PROBLEM — M51.369 BULGE OF LUMBAR DISC WITHOUT MYELOPATHY: Status: ACTIVE | Noted: 2025-05-16

## 2025-05-16 LAB — GLUCOSE BLDC GLUCOMTR-MCNC: 143 MG/DL (ref 70–99)

## 2025-05-16 PROCEDURE — 64483 NJX AA&/STRD TFRM EPI L/S 1: CPT | Performed by: PHYSICAL MEDICINE & REHABILITATION

## 2025-05-16 PROCEDURE — 64484 NJX AA&/STRD TFRM EPI L/S EA: CPT | Performed by: PHYSICAL MEDICINE & REHABILITATION

## 2025-05-16 RX ORDER — SODIUM CHLORIDE, SODIUM LACTATE, POTASSIUM CHLORIDE, CALCIUM CHLORIDE 600; 310; 30; 20 MG/100ML; MG/100ML; MG/100ML; MG/100ML
100 INJECTION, SOLUTION INTRAVENOUS CONTINUOUS
Status: DISCONTINUED | OUTPATIENT
Start: 2025-05-16 | End: 2025-05-16

## 2025-05-16 RX ORDER — LIDOCAINE HYDROCHLORIDE 10 MG/ML
INJECTION, SOLUTION EPIDURAL; INFILTRATION; INTRACAUDAL; PERINEURAL
Status: DISCONTINUED | OUTPATIENT
Start: 2025-05-16 | End: 2025-05-16

## 2025-05-16 RX ORDER — BETAMETHASONE SODIUM PHOSPHATE AND BETAMETHASONE ACETATE 3; 3 MG/ML; MG/ML
INJECTION, SUSPENSION INTRA-ARTICULAR; INTRALESIONAL; INTRAMUSCULAR; SOFT TISSUE
Status: DISCONTINUED | OUTPATIENT
Start: 2025-05-16 | End: 2025-05-16

## 2025-05-16 RX ORDER — IOPAMIDOL 408 MG/ML
INJECTION, SOLUTION INTRATHECAL
Status: DISCONTINUED | OUTPATIENT
Start: 2025-05-16 | End: 2025-05-16

## 2025-05-16 NOTE — DISCHARGE INSTRUCTIONS
[facility] ENDOSCOPY LAB SUITES  [address]  [facility] [address]  Dept: [phone]  Loc: [phone]    No name on file       Post Injection/Procedure Instructions    PAIN:  Your usual pain should gradually decrease in 2-3 days, but relief may sometimes take up to two weeks after your procedure.  A temporary flare-up of pain for 1-2 days after a procedure is also normal.  Please take your usual pain medicines if this happens.      ACTIVITY LEVELS:  Day of Procedure:  Take it easy.  We recommend no new activities or heavy work.  Avoid sitting or standing for long periods of time and change your position as needed.  Day 2:  You may return to normal activities within reason.  If your physician gives you specific instructions, please follow these.  You may return to physical therapy.      DIET:  Return to your normal diet as tolerated.    MEDICATIONS:  Aspirin and Blood-thinners:  Follow specific instructions your doctor gave you.  Otherwise, refrain from taking aspirin and other blood-thinning medications for 6 hours after your injection.  Then resume your next scheduled dose.    Resume your other medications the day of the injection.    BANDAGE:  Remove after 24 hours.    SHOWERING:  You may shower the following day.  No bathing, swimming, or hot tub for 2 days after the procedure to reduce the risk of infection.    COLD PACKS:  You may use ice compresses over the injection site - 20 minutes on, then 40 minutes off as needed.  To avoid skin injury, place a thin cloth between cold pack and the skin.  Do not apply cold packs to numb areas following injection.    Contact [facility] immediately if you experience any of the following:  Fever (over 100 degrees F), chills, drainage, excessive swelling or redness from the injection site, problems with bowel or bladder control, or new weakness or new severe pain.  If you cannot reach your physician, please go to the nearest  emergency room.      COMMON SIDE EFFECTS:  Numbness for 4 to 8 hours due to the local anesthetic.  Minor pain at the injection site.  A small amount of bleeding at the injection site.  If a steroid medication was used during the procedure, possible side effects include redness of the face, headache, trouble sleeping, indigestion, increased appetite and/or a low grade fever (less that 100 degrees F).  All side effects should disappear within 1 to 3 days after the procedure.    POST-PROCEDURAL HEADACHE:  Mild headaches may be a normal reaction after a steroid injection.  Lie down as much as possible for the first 24 hours.  You can take Tylenol up to 3 grams per day in doses of 1 gram every 8 hours.  Drink plenty of caffeinated beverages.  If you continue to have headaches after 24 hours following the procedure, or experience severe headaches, please contact our office.

## 2025-05-16 NOTE — INTERVAL H&P NOTE
Pre-op Diagnosis: Lumbar radiculopathy    The above referenced H&P was reviewed by Alex Behar, MD on 5/16/2025, the patient was examined and no significant changes have occurred in the patient's condition since the H&P was performed.  I discussed with the patient and/or legal representative the potential benefits, risks and side effects of this procedure; the likelihood of the patient achieving goals; and potential problems that might occur during recuperation.  I discussed reasonable alternatives to the procedure, including risks, benefits and side effects related to the alternatives and risks related to not receiving this procedure.  We will proceed with procedure as planned.

## 2025-05-16 NOTE — OPERATIVE REPORT
2-LEVEL LUMBAR TRANSFORAMINAL   NAME:  Kat Lara    MR #:    [id number] :  1955     PHYSICIAN:  Alex Behar, MD        Operative Report    DATE OF PROCEDURE: 2025   PREOPERATIVE DIAGNOSES: Lumbar radiculopathy, lumbar foraminal stenosis, lumbar disc bulge   POSTOPERATIVE DIAGNOSES:   Same   PROCEDURES: Right L5 and S1 transforaminal epidural steroid injections done under fluoroscopic guidance with contrast enhancement.   SURGEON: Alex Behar, MD   ANESTHESIA: Local    Time of Sedation: 0 minutes     OPERATIVE PROCEDURE:  Written consent was obtained from the patient.  The patient was brought into the operating room and placed in the prone position on the fluoroscopy table with pillow underneath the abdomen.  The patient's skin was cleaned and draped in a normal sterile fashion.  Using AP fluoroscopy, all five lumbar vertebrae were identified.  When the Right L5 and S1 vertebrae were identified, fluoroscopy was Right anterior obliqued opening up the Right L5 and S1 intervertebral foramen.  At this point in time, each site was anesthetized with 1% PF lidocaine without epinephrine.  Then, 5 inch, 22-gauge spinal needles were inserted and directed towards the Right L5 and S1 intervertebral foramen.  When they felt to be in good position, AP fluoroscopy was used to advance the needles to the 6 o'clock position on the Right L5 and S1  pedicles.  At this point in time, Omnipaque-240 contrast was used to obtain a good epidurogram indicating correct needle placement at each level.  Then, aspiration was performed.  No blood, fluid, or air was aspirated.  Then, the patient was injected with a 4 cc solution of 2cc 6mg/cc of Betamethasone and 2cc of 1% PF lidocaine without epinephrine at each site.  After this, the needles were removed.  Each site was cleaned.  Band-Aids were applied.  The patient was transferred to the cart and into [facility].  The patient was given discharge instructions and will  follow up in the clinic as scheduled.  Throughout the whole procedure, the patient's pulse oximetry and vital signs were monitored and they remained completely stable.  Also, throughout the whole procedure, prior to injection of any medication, aspiration was performed.  No blood, fluid, or air was aspirated at anytime.    Alex B. Behar MD, [facility]  Physical Medicine and Rehabilitation/Sports Medicine  [facility]

## 2025-05-16 NOTE — DISCHARGE SUMMARY
Outpatient Surgery Brief Discharge Summary         Patient ID:  Kat Lara  [id number]  69 year old  11/6/1955    Discharge Diagnoses: Lumbar stenosis with neurogenic claudication, lumbar disc bulge, mechanical low back pain, lumbar foraminal stenosis    Procedures: Caudal epidural steroid injection under local anesthesia    Discharged Condition: stable    Disposition: home    Patient Instructions: Follow-up with Alex Behar, MD in 1-2 weeks.    Diet: regular diet  Activity: As tolerated    Alex B. Behar MD, [facility]  Physical Medicine and Rehabilitation/Sports Medicine  [facility]

## 2025-07-25 DIAGNOSIS — M05.9 SEROPOSITIVE RHEUMATOID ARTHRITIS (HCC): ICD-10-CM

## 2025-07-25 NOTE — TELEPHONE ENCOUNTER
Requested Prescriptions     Pending Prescriptions Disp Refills    CIMZIA, 2 SYRINGE, 200 MG/ML Subcutaneous Prefilled Syringe Kit [Pharmacy Med Name: CIMZIA 2X200 MG/ML SYRINGE KIT]  0     Sig: INJECT 400 MG (2 ML) UNDER THE SKIN EVERY 28 DAYS (MAINTENANCE DOSE)     LOV: 2/17/25  Future Appointments   Date Time Provider Department Center   7/30/2025  3:00 PM [facility] [place]   8/20/2025  9:00 AM Blanca Castaneda MD [facility]   9/2/2025 10:00 AM Behar, Alex, MD PM&R AD  [facility] [place]     Labs:   Component      Latest Ref Rng 5/6/2025   WBC      4.0 - 11.0 x10(3) uL 7.7    RBC      3.80 - 5.30 x10(6)uL 3.28 (L)    Hemoglobin      12.0 - 16.0 g/dL 10.4 (L)    Hematocrit      35.0 - 48.0 % 33.1 (L)    MCV      80.0 - 100.0 fL 100.9 (H)    MCH      26.0 - 34.0 pg 31.7    MCHC      31.0 - 37.0 g/dL 31.4    RDW-SD      35.1 - 46.3 fL 50.1 (H)    RDW      11.0 - 15.0 % 13.5    Platelet Count      150.0 - 450.0 10(3)uL 176.0    Prelim Neutrophil Abs      1.50 - 7.70 x10 (3) uL 3.89    Neutrophils Absolute      1.50 - 7.70 x10(3) uL 3.89    Lymphocytes Absolute      1.00 - 4.00 x10(3) uL 2.89    Monocytes Absolute      0.10 - 1.00 x10(3) uL 0.73    Eosinophils Absolute      0.00 - 0.70 x10(3) uL 0.14    Basophils Absolute      0.00 - 0.20 x10(3) uL 0.02    Immature Granulocyte Absolute      0.00 - 1.00 x10(3) uL 0.01    Neutrophils %      % 50.7    Lymphocytes %      % 37.6    Monocytes %      % 9.5    Eosinophils %      % 1.8    Basophils %      % 0.3    Immature Granulocyte %      % 0.1    Glucose      70 - 99 mg/dL 102 (H)    Sodium      136 - 145 mmol/L 147 (H)    Potassium      3.5 - 5.1 mmol/L 3.5    Chloride      98 - 112 mmol/L 109    Carbon Dioxide, Total      21.0 - 32.0 mmol/L 26.0    ANION GAP      0 - 18 mmol/L 12    BUN      9 - 23 mg/dL 29 (H)    CREATININE      0.55 - 1.02 mg/dL 1.24 (H)    BUN/CREATININE RATIO      10.0 - 20.0  23.4 (H)    CALCIUM      8.7 - 10.4 mg/dL 8.8    CALCULATED  OSMOLALITY      275 - 295 mOsm/kg 310 (H)    EGFR      >=60 mL/min/1.73m2 47 (L)    ALT (SGPT)      10 - 49 U/L 15    AST (SGOT)      <34 U/L 16    ALKALINE PHOSPHATASE      55 - 142 U/L 76    Total Bilirubin      0.2 - 1.1 mg/dL 0.4    PROTEIN, TOTAL      5.7 - 8.2 g/dL 6.8    Albumin      3.2 - 4.8 g/dL 4.2    Globulin      2.0 - 3.5 g/dL 2.6    A/G Ratio      1.0 - 2.0  1.6    Patient Fasting for CMP? Yes       Legend:  (L) Low  (H) High    Instructions    You were seen today for rheumatoid arthritis  Continue methotrexate and Plaquenil  Continue Cimzia injections  Blood work today  I gave you referral to the eye doctor  Follow-up in 4 to 5 months, June or July

## 2025-07-27 RX ORDER — CERTOLIZUMAB PEGOL 200 MG/ML
INJECTION, SOLUTION SUBCUTANEOUS
Qty: 1 EACH | Refills: 0 | Status: SHIPPED | OUTPATIENT
Start: 2025-07-27

## 2025-07-30 ENCOUNTER — HOSPITAL ENCOUNTER (OUTPATIENT)
Dept: MAMMOGRAPHY | Age: 70
Discharge: HOME | End: 2025-07-30
Attending: FAMILY MEDICINE

## 2025-07-30 DIAGNOSIS — Z12.31 ENCOUNTER FOR SCREENING MAMMOGRAM FOR MALIGNANT NEOPLASM OF BREAST: ICD-10-CM

## 2025-07-30 PROCEDURE — 77067 SCR MAMMO BI INCL CAD: CPT | Performed by: FAMILY MEDICINE

## 2025-07-30 PROCEDURE — 77063 BREAST TOMOSYNTHESIS BI: CPT | Performed by: FAMILY MEDICINE

## 2025-08-20 ENCOUNTER — LAB ENCOUNTER (OUTPATIENT)
Dept: LAB | Facility: HOSPITAL | Age: 70
End: 2025-08-20
Attending: INTERNAL MEDICINE

## 2025-08-20 ENCOUNTER — OFFICE VISIT (OUTPATIENT)
Age: 70
End: 2025-08-20

## 2025-08-20 VITALS
HEIGHT: 63 IN | BODY MASS INDEX: 26.22 KG/M2 | SYSTOLIC BLOOD PRESSURE: 134 MMHG | WEIGHT: 148 LBS | HEART RATE: 66 BPM | DIASTOLIC BLOOD PRESSURE: 80 MMHG

## 2025-08-20 DIAGNOSIS — M05.9 SEROPOSITIVE RHEUMATOID ARTHRITIS (HCC): ICD-10-CM

## 2025-08-20 DIAGNOSIS — G89.29 CHRONIC BILATERAL LOW BACK PAIN WITHOUT SCIATICA: ICD-10-CM

## 2025-08-20 DIAGNOSIS — Z13.820 OSTEOPOROSIS SCREENING: ICD-10-CM

## 2025-08-20 DIAGNOSIS — M54.50 CHRONIC BILATERAL LOW BACK PAIN WITHOUT SCIATICA: ICD-10-CM

## 2025-08-20 DIAGNOSIS — Z51.81 THERAPEUTIC DRUG MONITORING: ICD-10-CM

## 2025-08-20 DIAGNOSIS — M05.9 SEROPOSITIVE RHEUMATOID ARTHRITIS (HCC): Primary | ICD-10-CM

## 2025-08-20 LAB
ALBUMIN SERPL-MCNC: 4.4 G/DL (ref 3.2–4.8)
ALT SERPL-CCNC: 11 U/L (ref 10–49)
AST SERPL-CCNC: 18 U/L (ref ?–34)
BASOPHILS # BLD AUTO: 0.02 X10(3) UL (ref 0–0.2)
BASOPHILS NFR BLD AUTO: 0.4 %
CREAT BLD-MCNC: 1.06 MG/DL (ref 0.55–1.02)
CRP SERPL-MCNC: <0.5 MG/DL (ref ?–0.5)
DEPRECATED RDW RBC AUTO: 48.4 FL (ref 35.1–46.3)
EGFRCR SERPLBLD CKD-EPI 2021: 57 ML/MIN/1.73M2 (ref 60–?)
EOSINOPHIL # BLD AUTO: 0.01 X10(3) UL (ref 0–0.7)
EOSINOPHIL NFR BLD AUTO: 0.2 %
ERYTHROCYTE [DISTWIDTH] IN BLOOD BY AUTOMATED COUNT: 13.6 % (ref 11–15)
ERYTHROCYTE [SEDIMENTATION RATE] IN BLOOD: 25 MM/HR (ref 0–30)
HCT VFR BLD AUTO: 35.7 % (ref 35–48)
HGB BLD-MCNC: 11.6 G/DL (ref 12–16)
IMM GRANULOCYTES # BLD AUTO: 0.01 X10(3) UL (ref 0–1)
IMM GRANULOCYTES NFR BLD: 0.2 %
LYMPHOCYTES # BLD AUTO: 1.83 X10(3) UL (ref 1–4)
LYMPHOCYTES NFR BLD AUTO: 39.6 %
MCH RBC QN AUTO: 31.2 PG (ref 26–34)
MCHC RBC AUTO-ENTMCNC: 32.5 G/DL (ref 31–37)
MCV RBC AUTO: 96 FL (ref 80–100)
MONOCYTES # BLD AUTO: 0.46 X10(3) UL (ref 0.1–1)
MONOCYTES NFR BLD AUTO: 10 %
NEUTROPHILS # BLD AUTO: 2.29 X10 (3) UL (ref 1.5–7.7)
NEUTROPHILS # BLD AUTO: 2.29 X10(3) UL (ref 1.5–7.7)
NEUTROPHILS NFR BLD AUTO: 49.6 %
PLATELET # BLD AUTO: 174 10(3)UL (ref 150–450)
RBC # BLD AUTO: 3.72 X10(6)UL (ref 3.8–5.3)
WBC # BLD AUTO: 4.6 X10(3) UL (ref 4–11)

## 2025-08-20 PROCEDURE — 82565 ASSAY OF CREATININE: CPT

## 2025-08-20 PROCEDURE — 84460 ALANINE AMINO (ALT) (SGPT): CPT

## 2025-08-20 PROCEDURE — 84450 TRANSFERASE (AST) (SGOT): CPT

## 2025-08-20 PROCEDURE — 85025 COMPLETE CBC W/AUTO DIFF WBC: CPT

## 2025-08-20 PROCEDURE — 82040 ASSAY OF SERUM ALBUMIN: CPT

## 2025-08-20 PROCEDURE — 85652 RBC SED RATE AUTOMATED: CPT

## 2025-08-20 PROCEDURE — 36415 COLL VENOUS BLD VENIPUNCTURE: CPT

## 2025-08-20 PROCEDURE — 86140 C-REACTIVE PROTEIN: CPT

## 2025-08-20 RX ORDER — KETOROLAC TROMETHAMINE 30 MG/ML
30 INJECTION, SOLUTION INTRAMUSCULAR; INTRAVENOUS ONCE
Status: COMPLETED | OUTPATIENT
Start: 2025-08-20 | End: 2025-08-20

## 2025-08-20 RX ADMIN — KETOROLAC TROMETHAMINE 30 MG: 30 INJECTION, SOLUTION INTRAMUSCULAR; INTRAVENOUS at 09:40:00

## 2025-08-22 ENCOUNTER — TELEPHONE (OUTPATIENT)
Age: 70
End: 2025-08-22

## (undated) DIAGNOSIS — M17.11 PRIMARY OSTEOARTHRITIS OF RIGHT KNEE: Primary | ICD-10-CM

## (undated) DEVICE — Device: Brand: STABLECUT®

## (undated) DEVICE — [HIGH FLOW INSUFFLATOR,  DO NOT USE IF PACKAGE IS DAMAGED,  KEEP DRY,  KEEP AWAY FROM SUNLIGHT,  PROTECT FROM HEAT AND RADIOACTIVE SOURCES.]: Brand: PNEUMOSURE

## (undated) DEVICE — PROXIMATE SKIN STAPLERS (35 WIDE) CONTAINS 35 STAINLESS STEEL STAPLES (FIXED HEAD): Brand: PROXIMATE

## (undated) DEVICE — GAMMEX® PI HYBRID SIZE 8, STERILE POWDER-FREE SURGICAL GLOVE, POLYISOPRENE AND NEOPRENE BLEND: Brand: GAMMEX

## (undated) DEVICE — DRAPE,UNDRBUT,WHT GRAD PCH,CAPPORT,20/CS: Brand: MEDLINE

## (undated) DEVICE — SUT VICRYL 2-0 FS-1 J443H

## (undated) DEVICE — WRAP COOLING KNEE W/ICE PILLOW

## (undated) DEVICE — HOOD: Brand: FLYTE

## (undated) DEVICE — APPLICATOR CHLORAPREP 26ML

## (undated) DEVICE — CEMENT MIXING SYSTEM WITH FEMORAL BREAKWAY NOZZLE: Brand: REVOLUTION

## (undated) DEVICE — SOL  .9 3000ML

## (undated) DEVICE — TOTAL KNEE: Brand: MEDLINE INDUSTRIES, INC.

## (undated) DEVICE — Device

## (undated) DEVICE — SYRINGE MED 10ML LL TIP W/O SFTY DISP

## (undated) DEVICE — TRAY SURESTEP 16 BARDEX DRAIN

## (undated) DEVICE — 3M™ IOBAN™ 2 ANTIMICROBIAL INCISE DRAPE 6650EZ: Brand: IOBAN™ 2

## (undated) DEVICE — LAP CHOLE: Brand: MEDLINE INDUSTRIES, INC.

## (undated) DEVICE — ADHESIVE MASTISOL 2/3CC VL

## (undated) DEVICE — NEEDLE HPO 18GA 1.5IN ECLPS

## (undated) DEVICE — FORCEP RADIAL JAW 4

## (undated) DEVICE — NEEDLE HYPO 16GA L1.5IN PUR REG BVL WRLD

## (undated) DEVICE — DEVICE PORT SITE CLOSURE

## (undated) DEVICE — TIP COVER ACCESSORY

## (undated) DEVICE — KIT DRN 1/8IN PVC 3 SPRG EVAC

## (undated) DEVICE — UNIVERSAL BLOCK TRAY: Brand: MEDLINE INDUSTRIES, INC.

## (undated) DEVICE — 3M™ STERI-DRAPE™ INSTRUMENT POUCH 1018L: Brand: STERI-DRAPE™

## (undated) DEVICE — COVER STND 54X23IN MAYO REINF

## (undated) DEVICE — CATH SECURING DEVICE STATLOCK

## (undated) DEVICE — PIN HEADED 48MM

## (undated) DEVICE — COTTON ROLL: Brand: DEROYAL

## (undated) DEVICE — UNDYED BRAIDED (POLYGLACTIN 910), SYNTHETIC ABSORBABLE SUTURE: Brand: COATED VICRYL

## (undated) DEVICE — SUT ETHIBOND 1-0 CTX CX30D

## (undated) DEVICE — CLIP HEMOLOK LARGE PURPLE

## (undated) DEVICE — GOWN SURG AERO BLUE PERF XLG

## (undated) DEVICE — DRESSING 10X4IN ANMC SAFETAC

## (undated) DEVICE — COLUMN DRAPE

## (undated) DEVICE — MEDI-VAC NON-CONDUCTIVE SUCTION TUBING 6MM X 1.8M (6FT.) L: Brand: CARDINAL HEALTH

## (undated) DEVICE — RL COT W1XL8.5FT 1LB HIGHLY

## (undated) DEVICE — SUCTION CANISTER, 3000CC,SAFELINER: Brand: DEROYAL

## (undated) DEVICE — PERSONA CM FM/CM TB/VE: Type: IMPLANTABLE DEVICE

## (undated) DEVICE — ADHESIVE MASTISOL 2/3ML

## (undated) DEVICE — ENCORE® LATEX MICRO SIZE 8, STERILE LATEX POWDER-FREE SURGICAL GLOVE: Brand: ENCORE

## (undated) DEVICE — SOL  .9 1000ML BTL

## (undated) DEVICE — SPNG GZ W4XL4IN COT 12 PLY TYP

## (undated) DEVICE — CANNULA SEAL

## (undated) DEVICE — SET EXTN 2.7ML TBNG L20IN DIA0.100IN MACBOR

## (undated) DEVICE — PIN HEX FEMALE 25MM

## (undated) DEVICE — CADIERE FORCEPS: Brand: ENDOWRIST

## (undated) DEVICE — LARGE HEM-O-LOK CLIP APPLIER: Brand: ENDOWRIST

## (undated) DEVICE — CASED DISP BIPOLAR CORD

## (undated) DEVICE — ARM DRAPE

## (undated) DEVICE — INSUFFLATION NEEDLE TO ESTABLISH PNEUMOPERITONEUM.: Brand: INSUFFLATION NEEDLE

## (undated) DEVICE — Device: Brand: DEFENDO AIR/WATER/SUCTION AND BIOPSY VALVE

## (undated) DEVICE — GAUZE SPONGES,12 PLY: Brand: CURITY

## (undated) DEVICE — NEEDLE SPNL 22GA L5IN BLK HUB QNCKE BVL DISP

## (undated) DEVICE — 6 ML SYRINGE LUER-LOCK TIP: Brand: MONOJECT

## (undated) DEVICE — BANDAGE FLXMSTR 11YDX6IN STRL

## (undated) DEVICE — SOLUTION  .9 3000ML

## (undated) DEVICE — APPLICATOR PREP 10.5ML ORNG CHG 2% ISO ALC

## (undated) DEVICE — 3M™ STERI-STRIP™ REINFORCED ADHESIVE SKIN CLOSURES, R1547, 1/2 IN X 4 IN (12 MM X 100 MM), 6 STRIPS/ENVELOPE: Brand: 3M™ STERI-STRIP™

## (undated) DEVICE — CONMED SCOPE SAVER BITE BLOCK, 20X27 MM: Brand: SCOPE SAVER

## (undated) DEVICE — NEEDLE HYPO 27GA L1.5IN REG BVL W/O SFTY

## (undated) DEVICE — LINE MNTR ADLT SET O2 INTMD

## (undated) DEVICE — 3M™ TEGADERM™ TRANSPARENT FILM DRESSING, 1626W, 4 IN X 4-3/4 IN (10 CM X 12 CM), 50 EACH/CARTON, 4 CARTON/CASE: Brand: 3M™ TEGADERM™

## (undated) DEVICE — TROCAR-Z

## (undated) DEVICE — GAMMEX® PI HYBRID SIZE 7.5, STERILE POWDER-FREE SURGICAL GLOVE, POLYISOPRENE AND NEOPRENE BLEND: Brand: GAMMEX

## (undated) DEVICE — 35 ML SYRINGE REGULAR TIP: Brand: MONOJECT

## (undated) DEVICE — GOWN SURG AERO BLUE PERF LG

## (undated) DEVICE — 48MM MG QUAD PIN-Z

## (undated) DEVICE — SUTURE VICRYL 0 UR-6

## (undated) DEVICE — SUTURE VICRYL 0 J906G

## (undated) DEVICE — Device: Brand: CUSTOM PROCEDURE KIT

## (undated) DEVICE — DISPOSABLE TOURNIQUET CUFF SINGLE BLADDER, DUAL PORT AND QUICK CONNECT CONNECTOR: Brand: COLOR CUFF

## (undated) DEVICE — PERMANENT CAUTERY HOOK: Brand: ENDOWRIST

## (undated) DEVICE — STERILE LATEX POWDER-FREE SURGICAL GLOVESWITH NITRILE COATING: Brand: PROTEXIS

## (undated) DEVICE — 25MM FEMALE SCREW-Z

## (undated) DEVICE — SOLUTION  .9 1000ML BTL

## (undated) DEVICE — 3 ML SYRINGE LUER-LOCK TIP: Brand: MONOJECT

## (undated) DEVICE — PEN 6" SURGICAL MARKING PURPL

## (undated) DEVICE — FENESTRATED BIPOLAR FORCEPS: Brand: ENDOWRIST

## (undated) DEVICE — MONOPOLAR CURVED SCISSORS: Brand: ENDOWRIST

## (undated) DEVICE — TIP-UP FENESTRATED GRASPER: Brand: ENDOWRIST

## (undated) NOTE — LETTER
04/03/2025    Kat Lara  [address] [facility] [address]    Kylee tinoco    Nos gustaría informarle que a sheikh cuenta se le morales cargado $40 por no presentarse a la oficina a sheikh carolina programada el 04/03/205.    Nuestra política de no presentación es la siguiente: Se requiere un aviso de 24 horas o se le puede cobrar ricky tarifa de $40 por no presentarse.      Si no puede asistir a sheikh carolina programada, notifíquenos con al menos 24 horas de anticipación para que podamos atender a nuestros otros pacientes. También puede reprogramar sheikh carolina en mercedez momento.    En la tercera inasistencia, dentro de un período de 12 meses, quedará a discreción del médico si se enviará ricky carta de santhosh para desconectarlo de la práctica y darle 30 rai para inscribirse con un nuevo médico que no sea de [facility].    Si desea impugnar emiliano cargo, llame al [phone].    Atentamente,  Richard Médico [facility]

## (undated) NOTE — LETTER
11/24/2020        [address] [place] [id number]          [facility]       Here are your results from your recent visit with Gastroenterology. Your Ultrasound revealed an abnormal appearance to the gallbladder.   Kacie Andrew

## (undated) NOTE — LETTER
10/8/2020          [address] [place] [id number]    Dear [employer],       Here are the biopsy/pathology findings from your recent EGD (Upper  Endoscopy):    No abnormalities.   Your biopsies were negative for celiac dise

## (undated) NOTE — LETTER
[address]  Authorization for Surgical Operation and Procedure                                                                                           1. I hereby authorize He Mcmillan MD, my physician and his/her assistants (if applicable), which may include medical students, residents, and/or fellows, to perform the following surgical operation/ procedure and administer such anesthesia as may be determined necessary by my physician: Operation/Procedure name (s) Left Total Knee Arthroplasty on [address] [facility]   2. I recognize that during the surgical operation/procedure, unforeseen conditions may necessitate additional or different procedures than those listed above. I, therefore, further authorize and request that the above-named surgeon, assistants, or designees perform such procedures as are, in their judgment, necessary and desirable. 3.   My surgeon/physician has discussed prior to my surgery the potential benefits, risks and side effects of this procedure; the likelihood of achieving goals; and potential problems that might occur during recuperation. They also discussed reasonable alternatives to the procedure, including risks, benefits, and side effects related to the alternatives and risks related to not receiving this procedure. I have had all my questions answered and I acknowledge that no guarantee has been made as to the result that may be obtained. 4.   Should the need arise during my operation/procedure, which includes change of level of care prior to discharge, I also consent to the administration of blood and/or blood products. Further, I understand that despite careful testing and screening of blood or blood products by collecting agencies, I may still be subject to ill effects as a result of receiving a blood transfusion and/or blood products.   The following are some, but not all, of the potential risks that can occur: fever and allergic reactions, hemolytic reactions, transmission of diseases such as Hepatitis, AIDS and Cytomegalovirus (CMV) and fluid overload. In the event that I wish to have an autologous transfusion of my own blood, or a directed donor transfusion, I will discuss this with my physician. Check only if Refusing Blood or Blood Products  I understand refusal of blood or blood products as deemed necessary by my physician may have serious consequences to my condition to include possible death. I hereby assume responsibility for my refusal and release the hospital, its personnel, and my physicians from any responsibility for the consequences of my refusal.    o  Refuse   5. I authorize the use of any specimen, organs, tissues, body parts or foreign objects that may be removed from my body during the operation/procedure for diagnosis, research or teaching purposes and their subsequent disposal by hospital authorities. I also authorize the release of specimen test results and/or written reports to my treating physician on the hospital medical staff or other referring or consulting physicians involved in my care, at the discretion of the Pathologist or my treating physician. 6.   I consent to the photographing or videotaping of the operations or procedures to be performed, including appropriate portions of my body for medical, scientific, or educational purposes, provided my identity is not revealed by the pictures or by descriptive texts accompanying them. If the procedure has been photographed/videotaped, the surgeon will obtain the original picture, image, videotape or CD. The hospital will not be responsible for storage, release or maintenance of the picture, image, tape or CD.    7.   I consent to the presence of a  or observers in the operating room as deemed necessary by my physician or their designees.     8.   I recognize that in the event my procedure results in extended X-Ray/fluoroscopy time, I may develop a skin reaction. 9. If I have a Do Not Attempt Resuscitation (DNAR) order in place, that status will be suspended while in the operating room, procedural suite, and during the recovery period unless otherwise explicitly stated by me (or a person authorized to consent on my behalf). The surgeon or my attending physician will determine when the applicable recovery period ends for purposes of reinstating the DNAR order. 10. Patients having a sterilization procedure: I understand that if the procedure is successful the results will be permanent and it will therefore be impossible for me to inseminate, conceive, or bear children. I also understand that the procedure is intended to result in sterility, although the result has not been guaranteed. 11. I acknowledge that my physician has explained sedation/analgesia administration to me including the risk and benefits I consent to the administration of sedation/analgesia as may be necessary or desirable in the judgment of my physician. I CERTIFY THAT I HAVE READ AND FULLY UNDERSTAND THE ABOVE CONSENT TO OPERATION and/or OTHER PROCEDURE.     _________________________________________ _________________________________     ___________________________________  Signature of Patient     Signature of Responsible Person                   Printed Name of Responsible Person                              _________________________________________ ______________________________        ___________________________________  Signature of Witness         Date  Time         Relationship to Patient    STATEMENT OF PHYSICIAN My signature below affirms that prior to the time of the procedure; I have explained to the patient and/or his/her legal representative, the risks and benefits involved in the proposed treatment and any reasonable alternative to the proposed treatment.  I have also explained the risks and benefits involved in refusal of the proposed treatment and alternatives to the proposed treatment and have answered the patient's questions.  If I have a significant financial interest in a co-management agreement or a significant financial interest in any product or implant, or other significant relationship used in this procedure/surgery, I have disclosed this and had a discussion with my patient.     _______________________________________________________________ _____________________________  (Signature of Physician)                                                                                         (Date)                                   (Time)  Patient Name: Marcell Khan    : 1955   Printed: 2022      Medical Record #: [id number]                                              Page 1 of 1

## (undated) NOTE — LETTER
12/15/2020        [address] [place] [id number]          Dear Mortimer Coke,       Here are your results from your recent visit with Gastroenterology. Your MRI of the liver shows a benign hemangioma.   This is a benign abnor

## (undated) NOTE — LETTER
[address], [address] [facility], [facility] [address]  [address]   [phone] [address] [facility], [address] [place] [address], [address] [place] [address]   [phone]       September 14, 2020    Arianna Corley

## (undated) NOTE — LETTER
April 23, 2024      No Recipients     Patient: Kat Lara   YOB: 1955   Date of Visit: 4/23/2024       Dear Dr. Johnson Recipients:    Thank you for referring Kat Lara to me for evaluation. Here is my assessment and plan of care:    Kat Lara is a 68 year old female.    HPI:     HPI    Pt. Here for a DM and Plaquenil eye exam.  States is on Plaquenil for 3-4 years.   C/O blurry vision both for distance and near even with the glasses on since 4-5 months.  Wears progressive glasses that are a year old.   C/O FBS, itching and crusty eyelids on waking up in the morning.   No H/O prior eye Sx/trauma/CL wear.   Last DFE a year ago.   Using iVizia eye drops 2 times a day.     Pt has been a diabetic for 5 years       Pt's diabetes is currently controlled by pills.  Pt does not monitor BS regularly at home.   Pt's last blood sugar was  96 on 07/05/23  Last HA1C was 5.9 on 07/05/23  Endocrinologist: None        Last edited by Gay Wolf OT on 4/23/2024  3:42 PM.        Patient History:  Past Medical History:    Adenomatosis, biliary    Diabetes (HCC)    Esophageal reflux    Essential hypertension    High blood pressure    OA (osteoarthritis)    RA (rheumatoid arthritis) (HCC)    Visual impairment    glasses       Surgical History: Kat Lara has a past surgical history that includes hysterectomy; tubal ligation; colonoscopy (N/A, 10/7/2020) (Procedure: COLONOSCOPY;  Surgeon: Keshav Saucedo MD;  Location: [facility] ENDOSCOPY); cholecystectomy (03/25/2021); total knee replacement (Right, 05/23/2022); and knee replacement surgery (Right, 05/23/2022).    Family History   Problem Relation Age of Onset    Diabetes Mother     Hypertension Mother     No Known Problems Father     Diabetes Sister     Diabetes Brother     Glaucoma Neg     Macular degeneration Neg        Social History:   Social History     Socioeconomic History    Marital status:      Number of children: 3   Tobacco Use    Smoking status: Never    Smokeless tobacco: Never   Vaping Use    Vaping status: Never Used   Substance and Sexual Activity    Alcohol use: Not Currently    Drug use: Never     Social Determinants of Health     Financial Resource Strain: Low Risk  (9/28/2021)    [facility]    Overall Financial Resource Strain (CARDIA)     Difficulty of Paying Living Expenses: Not very hard   Food Insecurity: No Food Insecurity (11/1/2021)    [facility], [facility]    Food Insecurity     Within the past 30 days, I worried whether my food would run out before I got money to buy more. / En los últimos 30 días, me preocupó que la comida se podía acabar antes de tener dinero para compr...: Never true / Nunca     Within the past 30 days, the food that I bought just didn't last, and I didn't have money to get more. / En los últimos 30 días, La comida que compré no rindió lo suficiente, y no tenía dinero para...: Never true / Nunca   Housing Stability: Low Risk  (9/28/2021)    [facility], [facility]    Housing Stability Vital Sign     Unable to Pay for Housing in the Last Year: No     Number of Places Lived in the Last Year: 1     Unstable Housing in the Last Year: No       Medications:  Current Outpatient Medications   Medication Sig Dispense Refill    Tofacitinib Citrate ER (XELJANZ XR) 11 MG Oral Tablet 24 Hr Take 11 mg by mouth daily. 30 tablet 3    gabapentin 600 MG Oral Tab Take 1 tablet (600 mg total) by mouth 3 (three) times daily. 90 tablet 2    folic acid 1 MG Oral Tab Take 1 tablet (1 mg total) by mouth daily. 90 tablet 1    hydroxychloroquine 200 MG Oral Tab Take 2 tablets (400 mg total) by mouth daily. 180 tablet 1    methotrexate 2.5 MG Oral Tab Take 8 tablets (20 mg total) by mouth once a week. 103  tablet 1    CELECOXIB 200 MG Oral Cap TAKE 1 CAPSULE(200 MG) BY MOUTH DAILY (Patient not taking: Reported on 10/23/2023) 30 capsule 0    oxyCODONE-acetaminophen (PERCOCET) 5-325 MG Oral Tab Take 1 tablet by mouth every 6 (six) hours as needed for Pain. (Patient not taking: Reported on 2/23/2024) 20 tablet 0    traMADol 50 MG Oral Tab Take 1 tablet (50 mg total) by mouth every 6 (six) hours as needed for Pain. No alcohol or driving on this med. Stop if lethargic or hallucinating. (Patient not taking: Reported on 7/31/2023) 20 tablet 0    amitriptyline 10 MG Oral Tab Take 1 tablet (10 mg total) by mouth nightly. (Patient not taking: Reported on 10/23/2023)      aspirin 325 MG Oral Tab Take 1 tablet (325 mg total) by mouth 2 (two) times daily. (Patient not taking: Reported on 10/23/2023) 28 tablet 0    polyethylene glycol, PEG 3350, 17 g Oral Powd Pack Take 17 g by mouth daily as needed. (Patient not taking: Reported on 7/31/2023) 24 each 0    losartan Potassium 50 MG Oral Tab Take by mouth daily.      FEROSUL 325 (65 Fe) MG Oral Tab       metFORMIN HCl 500 MG Oral Tab Take 1 tablet (500 mg total) by mouth daily.  1       Allergies:  Allergies   Allergen Reactions    Enalapril Coughing     cough       ROS:       PHYSICAL EXAM:     Base Eye Exam       Visual Acuity (Snellen - Linear)         Right Left    Dist cc 20/60 20/150    Dist ph cc 20/40 20/100    Near cc 20/100 20/70      Correction: Glasses              Tonometry (Icare, 3:32 PM)         Right Left    Pressure 13 15              Pupils         Pupils    Right PERRL    Left PERRL              Visual Fields         Left Right     Full Full              Extraocular Movement         Right Left     Full Full              Dilation       Both eyes: 1.0% Mydriacyl and 2.5% Vaibhav Synephrine @ 3:32 PM              Dilation #2       Both eyes: 1.0% Mydriacyl and 2.5% Vaibhav Synephrine @ 3:32 PM                  Additional Tests       Amsler         Right Left     Normal  Normal   States its all blurry.              Color         Right Left    Ishihara 10/10 10/10                  Slit Lamp and Fundus Exam       Slit Lamp Exam         Right Left    Lids/Lashes Dermatochalasis, Meibomian gland dysfunction, Permanent eyeliner upper and lower Dermatochalasis, Meibomian gland dysfunction, Permanent eyeliner upper and lower    Conjunctiva/Sclera Temp pinguecula Nasal/temp pinguecula    Cornea 1+ Arcus 1+ Arcus    Anterior Chamber Deep and quiet Deep and quiet    Iris Normal Normal    Lens 1+ Nuclear sclerosis 1+ Nuclear sclerosis    Vitreous Vitreous floaters Vitreous floaters              Fundus Exam         Right Left    Disc Good rim, Temporal crescent Good rim, Temporal crescent    C/D Ratio 0.3 0.3    Macula Normal-no BDR, no Plaquenil toxicity Normal-no BDR, no Plaquenil toxicity    Vessels Normal Normal    Periphery Normal Normal                  Lacrimal Exam       Schirmers         Right Left     11 mm 11 mm      Anesthesia: Yes                  Refraction       Wearing Rx         Sphere Cylinder Axis Add    Right +0.25 +1.25 174 +2.25    Left +0.25 +1.25 029 +2.25      Age: 1yr    Type: Progressive bifocal              Manifest Refraction (Auto)         Sphere Cylinder [place] Dist VA Add Near VA    Right +1.00 +1.50 160       Left +0.50 +0.75 023                 Manifest Refraction #2         Sphere Cylinder [place] Dist VA Add Near VA    Right +1.00 +1.25 160 20/50+ +2.75 20/40    Left +0.50 +1.00 030 20/100 +2.75 20/50              Manifest Refraction Comments    Refraction done for BCVA             Final Rx         Sphere Cylinder [place] Dist VA Add Near VA    Right +1.00 +1.25 160 20/50+ +2.75 20/40    Left +0.50 +1.00 030 20/100 +2.75 20/50      Type: Progressive bifocal                     ASSESSMENT/PLAN:     Diagnoses and Plan:     Long-term use of Plaquenil   No evidence of plaquenil toxicity in either eye.  Will follow in 1 year for a plaquenil eye exam based on current  guidelines.   Patient is approved to continue on plaquenil as directed by their PCP or rheumatologist.      Type 2 diabetes mellitus without retinopathy (HCC)  Diabetes type II: no background of retinopathy, no signs of neovascularization noted.  Discussed ocular and systemic benefits of blood sugar control.  Diagnosis and treatment discussed in detail with patient.      Age-related nuclear cataract of both eyes  Discussed early cataracts with patient.  No treatment recommended at this time.     New glasses Rx given today, recommend update    Vitreous floaters of both eyes   There is no evidence of retinal pathology.  All signs and symptoms of retinal detachment/tears explained in detail.    Patient instructed to call the office if they experience increase in floaters, increase in flashes of light, loss of vision or curtain or veil effect.       No orders of the defined types were placed in this encounter.      Meds This Visit:  Requested Prescriptions      No prescriptions requested or ordered in this encounter        Follow up instructions:  Return in about 1 year (around 4/23/2025) for Plaquenil eye exam, Diabetic eye exam.    4/23/2024  Scribed by: Simeon Philippe MD        If you have questions, please do not hesitate to call me. I look forward to following Kat along with you.    Sincerely,        Simeon Philippe MD        CC:   No Recipients    Document electronically generated by: Simeon Philippe MD

## (undated) NOTE — LETTER
Patient Name: Kat Lara : 1955  Medical Record #: [id number] Printed: 2025  Page 1 of 2                                          [facility]  [address], [place], [place]  Autorización para operación y procedimiento quirúrgico                                                                                                      Por la presente, autorizo a BeharYordan MD, mi médico y al asistente a realizar la operación/procedimiento quirúrgico a continuación, así bella a administrar la anestesia que determine necesaria mi médico Nombre (s) de la operación/procedimiento: Right L5 and S1 Transforaminal Epidural Steroid Injection en Kat Lara     Reconozco que melanie la operación/procedimiento quirúrgico, las condiciones imprevistas pueden requerir de procedimientos adicionales o diferentes a aquellos mencionados anteriormente.  Por lo tanto, autorizo y solicito además que el cirujano antes mencionado, los asistentes o las personas designadas realicen los procedimientos que, a sheikh juicio, cat necesarios y convenientes.    Mi cirujano/médico aleman discutido antes de mi cirugía los posibles beneficios, riesgos y efectos secundarios de emiliano procedimiento, la probabilidad de alcanzar las metas y los posibles problemas que puedan ocurrir melanie la recuperación.  Ellos también morales discutido las alternativas razonables al procedimiento, incluso los riesgos, beneficios y efectos secundarios relacionados con las alternativas y los riesgos relacionados con no realizar emiliano procedimiento.  Morales respondido a todas mis preguntas y confirmo que no se ha dado ninguna garantía en cuanto a los resultados que pueda obtener.    En teresita de que surja la necesidad melanie mi operación o melanie el periodo postoperatorio, también autorizo se aplique dayo y/o productos sanguíneos.  Asimismo, entiendo que a pesar de las cuidadosas pruebas y análisis de dayo o de los  productos sanguíneos que realizan las entidades recolectoras, todavía puedo estar sujeto a efectos adversos bella resultado de recibir ricky transfusión de dayo y/o productos sanguíneos.  A continuación se mencionan algunos, aunque no todos, los riesgos potenciales que pueden ocurrir: fiebre y reacciones alérgicas, reacciones hemolíticas, trasmisión de enfermedades bella hepatitis, SIDA y citomegalovirus (CMV), así bella sobrecarga de líquidos.   En teresita de que desee tener ricky transfusión autóloga de mi propia dayo o ricky transfusión de un donante dirigido.  Lo discutiré con mi médico.  Check only if Refusing Blood or Blood Products  I understand refusal of blood or blood products as deemed necessary by my physician may have serious consequences to my condition to include possible death. I hereby assume responsibility for my refusal and release the hospital, its personnel, and my physicians from any responsibility for the consequences of my refusal.           o  Refuse       Autorizo el uso de cualquier muestra, órgano, tejido, parte del cuerpo u objeto extraño que pueda ser extraído de mi cuerpo melanie la operación/procedimiento para fines de diagnóstico, investigación o de enseñanza y sheikh desecho posterior por las autoridades del hospital.  También, autorizo la revelación de los resultados de las pruebas de muestras y/o los informes escritos al médico tratante bella personal médico del hospital u otros médicos de referencia o consulta involucrados en mi atención, a discreción del patólogo o de mi médico tratante.    Doy consentimiento para que se fotografíen o graben vídeos de las operaciones o procedimientos a realizarse, incluidas las partes de mi cuerpo que cat adecuadas para propósitos médicos, científicos o educativos, en el entendido de que, mi identidad no sea revelada por las fotografías o por textos descriptivos que las acompañen.  Si el procedimiento es fotografiado o grabado en vídeo, el cirujano obtendrá  la imagen, cinta de vídeo o CD original.  El hospital no se hará responsable por el almacenamiento, la revelación o el mantenimiento de la imagen, cinta o CD.    Autorizo la presencia de un especialista de producto u observadores en el quirófano, según lo considere necesario mi médico o las personas que éste designe.     Reconozco que en teresita de que mi procedimiento resulte en un tiempo prolongado de radiografía/fluoroscopia, puedo desarrollar ricky reacción en la piel.    Si tengo ricky orden de No intentar la reanimación (ARTUR), mercedez estado se suspenderá mientras esté en el quirófano, la bran de procedimientos y melanie el periodo de recuperación a menos que yo indique lo contrario explícitamente (o ricky persona autorizada a phillip el consentimiento en mi nombre). El cirujano o mi médico tratante determinarán cuándo termina el periodo de recuperación aplicable a los efectos de restablecer la orden de ARTUR.  Pacientes que se realizan un procedimiento de esterilización: Entiendo que si el procedimiento tiene éxito, los resultados serán permanentes y que, por lo tanto, me será imposible inseminar, concebir o tener hijos.  Asimismo, entiendo que el procedimiento tiene bella propósito la esterilidad, aunque el resultado no está garantizado.   Admito que mi médico me ha explicado la aplicación de sedación/analgesia, incluidos los riesgos y beneficios y, consiento a la administración de sedación/analgesia conforme sea necesario o conveniente a juicio de mi médico.      CERTIFICO QUE HE LEÍDO Y COMPRENDIDO EL CONSENTIMIENTO ANTERIOR PARA LA OPERACIÓN y/o PROCEDIMIENTO.             ______________________________________  _______________________________  Firma del paciente      Firma de la persona responsible  Signature of patient      Signature of responsible person                        ___________________________________                                   Nombre en imprenta de la persona responsible         Name of responsible  person          ___________________________________                 Relación con el paciente         Relationship to patient    _________________________________________  ______________ ________________  Firma del testigo          Fecha / Date Hora / Time  Signature of witness    DECLARACÓN DEL MÉDICO Mediante mi firma al calce, afirmo que antes de la hora del procedimiento, he explicadoal paciente y/o a sheikh representante legal, los riesgos y beneficios involucrados en el tratamiento propuesto, así comocualquier alternativa razonable al tratamiento propuesto. También le(s) he explicado los riesgos y beneficios involucradosen el rechazo del tratarniento propuesto y alternativas al tratamiento propuesto, y he respondido a las preguntas del(la) paciente(My signature below affirms that prior to the time of the procedure, I have explained to the patient and/or his/her guardian, therisks and benefits involved in the proposed treatment and any reasonable alternative to the proposed treatment. I have alsoexplained the risks and benefits involved in refusal of the proposed treatment and have answered the patient's questions.)    ________________________________________   _________________________   _____________   (Firma del médico/Signature of Physician)                                    (Fecha/Date)                                             (Hora/Time)      Patient Name: Kat Caritoradha Lara     : 1955                 Printed: 2025      Medical Record #: [id number]                                              Page 2 of 2